# Patient Record
Sex: FEMALE | Race: WHITE | Employment: FULL TIME | URBAN - METROPOLITAN AREA
[De-identification: names, ages, dates, MRNs, and addresses within clinical notes are randomized per-mention and may not be internally consistent; named-entity substitution may affect disease eponyms.]

---

## 2017-01-02 DIAGNOSIS — M62.830 BACK MUSCLE SPASM: ICD-10-CM

## 2017-01-02 DIAGNOSIS — E78.5 HYPERLIPIDEMIA LDL GOAL <100: ICD-10-CM

## 2017-01-02 DIAGNOSIS — I10 HYPERTENSION GOAL BP (BLOOD PRESSURE) < 140/90: Primary | ICD-10-CM

## 2017-01-02 NOTE — TELEPHONE ENCOUNTER
zanaflex      Last Written Prescription Date:  7/13/2016  Last Fill Quantity: 90,   # refills: 0  Last Office Visit with [facility], [facility] or [facility] prescribing provider: 6/17/16  Future Office visit:       Routing refill request to provider for review/approval because:  Drug not on the [facility], [facility] or [facility] refill protocol or controlled substance    Zocor     Last Written Prescription Date: 12/28/15  Last Fill Quantity: 90, # refills: 1  Last Office Visit with [facility], [facility] or [facility] prescribing provider: 6/17/16       CHOL      154   12/1/2015  HDL       45   12/1/2015  LDL       85   12/1/2015  TRIG      121   12/1/2015  CHOLHDLRATIO      2.8   8/11/2014    toprol      Last Written Prescription Date: 12/28/15  Last Fill Quantity: 135, # refills: 3  Last Office Visit with [facility], [facility] or [facility] prescribing provider: 6/17/16       POTASSIUM   Date Value Ref Range Status   12/07/2015 3.5 3.4 - 5.3 mmol/L Final     CREATININE   Date Value Ref Range Status   12/07/2015 0.65 0.52 - 1.04 mg/dL Final     BP Readings from Last 3 Encounters:   06/17/16 116/70   01/25/16 138/80   12/28/15 120/80

## 2017-01-03 RX ORDER — METOPROLOL SUCCINATE 100 MG/1
TABLET, EXTENDED RELEASE ORAL
Qty: 90 TABLET | Refills: 0 | Status: SHIPPED | OUTPATIENT
Start: 2017-01-03 | End: 2017-01-23

## 2017-01-03 RX ORDER — TIZANIDINE 2 MG/1
TABLET ORAL
Qty: 90 TABLET | Refills: 0 | Status: SHIPPED | OUTPATIENT
Start: 2017-01-03 | End: 2017-01-23

## 2017-01-03 RX ORDER — SIMVASTATIN 40 MG
TABLET ORAL
Qty: 90 TABLET | Refills: 0 | Status: SHIPPED | OUTPATIENT
Start: 2017-01-03 | End: 2017-01-23

## 2017-01-04 NOTE — TELEPHONE ENCOUNTER
Left message for patient to call back, and speak with any .     Thank you,   Janeth Martinez   for [facility]

## 2017-01-04 NOTE — TELEPHONE ENCOUNTER
Message from Brook Haro PA-C sent at 1/3/2017 12:14 PM CST  Patient-she is overdue for her yearly physical and refill meds. Please make sure appointment is in the morning so that she can be fasting for lab work related to medications.

## 2017-01-23 ENCOUNTER — OFFICE VISIT (OUTPATIENT)
Dept: FAMILY MEDICINE | Facility: CLINIC | Age: 52
End: 2017-01-23
Payer: COMMERCIAL

## 2017-01-23 VITALS
SYSTOLIC BLOOD PRESSURE: 120 MMHG | DIASTOLIC BLOOD PRESSURE: 70 MMHG | RESPIRATION RATE: 16 BRPM | TEMPERATURE: 98.3 F | HEIGHT: 64 IN | WEIGHT: 221 LBS | BODY MASS INDEX: 37.73 KG/M2 | HEART RATE: 72 BPM

## 2017-01-23 DIAGNOSIS — Z12.11 SCREENING FOR MALIGNANT NEOPLASM OF COLON: ICD-10-CM

## 2017-01-23 DIAGNOSIS — E78.5 HYPERLIPIDEMIA LDL GOAL <100: ICD-10-CM

## 2017-01-23 DIAGNOSIS — F41.0 ANXIETY ATTACK: ICD-10-CM

## 2017-01-23 DIAGNOSIS — I10 HYPERTENSION GOAL BP (BLOOD PRESSURE) < 140/90: ICD-10-CM

## 2017-01-23 DIAGNOSIS — F41.1 GAD (GENERALIZED ANXIETY DISORDER): ICD-10-CM

## 2017-01-23 DIAGNOSIS — J45.20 MILD INTERMITTENT ASTHMA, UNCOMPLICATED: ICD-10-CM

## 2017-01-23 DIAGNOSIS — B35.4 TINEA CORPORIS: ICD-10-CM

## 2017-01-23 DIAGNOSIS — Z23 NEED FOR VACCINATION: ICD-10-CM

## 2017-01-23 DIAGNOSIS — Z00.01 ENCOUNTER FOR ROUTINE ADULT MEDICAL EXAM WITH ABNORMAL FINDINGS: Primary | ICD-10-CM

## 2017-01-23 DIAGNOSIS — R79.89 ELEVATED TSH: ICD-10-CM

## 2017-01-23 PROBLEM — R73.01 ABNORMAL FASTING GLUCOSE: Status: ACTIVE | Noted: 2017-01-23

## 2017-01-23 LAB
ALBUMIN SERPL-MCNC: 4 G/DL (ref 3.4–5)
ALP SERPL-CCNC: 57 U/L (ref 40–150)
ALT SERPL W P-5'-P-CCNC: 24 U/L (ref 0–50)
ANION GAP SERPL CALCULATED.3IONS-SCNC: 6 MMOL/L (ref 3–14)
AST SERPL W P-5'-P-CCNC: 12 U/L (ref 0–45)
BILIRUB SERPL-MCNC: 0.8 MG/DL (ref 0.2–1.3)
BUN SERPL-MCNC: 16 MG/DL (ref 7–30)
CALCIUM SERPL-MCNC: 9 MG/DL (ref 8.5–10.1)
CHLORIDE SERPL-SCNC: 106 MMOL/L (ref 94–109)
CHOLEST SERPL-MCNC: 160 MG/DL
CO2 SERPL-SCNC: 30 MMOL/L (ref 20–32)
CREAT SERPL-MCNC: 0.75 MG/DL (ref 0.52–1.04)
CREAT UR-MCNC: 154 MG/DL
GFR SERPL CREATININE-BSD FRML MDRD: 81 ML/MIN/1.7M2
GLUCOSE SERPL-MCNC: 104 MG/DL (ref 70–99)
HDLC SERPL-MCNC: 55 MG/DL
LDLC SERPL CALC-MCNC: 80 MG/DL
MICROALBUMIN UR-MCNC: <5 MG/L
MICROALBUMIN/CREAT UR: NORMAL MG/G CR (ref 0–25)
NONHDLC SERPL-MCNC: 105 MG/DL
POTASSIUM SERPL-SCNC: 4.1 MMOL/L (ref 3.4–5.3)
PROT SERPL-MCNC: 7.3 G/DL (ref 6.8–8.8)
SODIUM SERPL-SCNC: 142 MMOL/L (ref 133–144)
T4 FREE SERPL-MCNC: 0.93 NG/DL (ref 0.76–1.46)
TRIGL SERPL-MCNC: 125 MG/DL
TSH SERPL DL<=0.05 MIU/L-ACNC: 5.27 MU/L (ref 0.4–4)

## 2017-01-23 PROCEDURE — 80061 LIPID PANEL: CPT | Performed by: PHYSICIAN ASSISTANT

## 2017-01-23 PROCEDURE — 90471 IMMUNIZATION ADMIN: CPT | Performed by: PHYSICIAN ASSISTANT

## 2017-01-23 PROCEDURE — 90670 PCV13 VACCINE IM: CPT | Performed by: PHYSICIAN ASSISTANT

## 2017-01-23 PROCEDURE — 99396 PREV VISIT EST AGE 40-64: CPT | Mod: 25 | Performed by: PHYSICIAN ASSISTANT

## 2017-01-23 PROCEDURE — 82274 ASSAY TEST FOR BLOOD FECAL: CPT | Performed by: PHYSICIAN ASSISTANT

## 2017-01-23 PROCEDURE — 84443 ASSAY THYROID STIM HORMONE: CPT | Performed by: PHYSICIAN ASSISTANT

## 2017-01-23 PROCEDURE — 99212 OFFICE O/P EST SF 10 MIN: CPT | Mod: 25 | Performed by: PHYSICIAN ASSISTANT

## 2017-01-23 PROCEDURE — 36415 COLL VENOUS BLD VENIPUNCTURE: CPT | Performed by: PHYSICIAN ASSISTANT

## 2017-01-23 PROCEDURE — 82043 UR ALBUMIN QUANTITATIVE: CPT | Performed by: PHYSICIAN ASSISTANT

## 2017-01-23 PROCEDURE — 80053 COMPREHEN METABOLIC PANEL: CPT | Performed by: PHYSICIAN ASSISTANT

## 2017-01-23 PROCEDURE — 84439 ASSAY OF FREE THYROXINE: CPT | Performed by: PHYSICIAN ASSISTANT

## 2017-01-23 RX ORDER — SIMVASTATIN 40 MG
40 TABLET ORAL DAILY
Qty: 90 TABLET | Refills: 3 | Status: SHIPPED | OUTPATIENT
Start: 2017-01-23 | End: 2018-02-19

## 2017-01-23 RX ORDER — FLUCONAZOLE 200 MG/1
TABLET ORAL
Qty: 60 TABLET | Refills: 0 | Status: SHIPPED | OUTPATIENT
Start: 2017-01-23 | End: 2019-03-12

## 2017-01-23 RX ORDER — METOPROLOL SUCCINATE 100 MG/1
TABLET, EXTENDED RELEASE ORAL
Qty: 90 TABLET | Refills: 3 | Status: SHIPPED | OUTPATIENT
Start: 2017-01-23 | End: 2018-02-19 | Stop reason: DRUGHIGH

## 2017-01-23 RX ORDER — IPRATROPIUM BROMIDE AND ALBUTEROL SULFATE 2.5; .5 MG/3ML; MG/3ML
SOLUTION RESPIRATORY (INHALATION)
Qty: 180 ML | Refills: 4 | Status: SHIPPED | OUTPATIENT
Start: 2017-01-23 | End: 2018-02-19

## 2017-01-23 RX ORDER — ALBUTEROL SULFATE 90 UG/1
2 AEROSOL, METERED RESPIRATORY (INHALATION) EVERY 6 HOURS PRN
Qty: 3 INHALER | Refills: 3 | Status: SHIPPED | OUTPATIENT
Start: 2017-01-23 | End: 2018-02-19

## 2017-01-23 RX ORDER — LORAZEPAM 0.5 MG/1
.25-.5 TABLET ORAL EVERY 6 HOURS PRN
Qty: 90 TABLET | Refills: 0 | Status: SHIPPED | OUTPATIENT
Start: 2017-01-23 | End: 2018-02-19

## 2017-01-23 ASSESSMENT — PAIN SCALES - GENERAL: PAINLEVEL: NO PAIN (0)

## 2017-01-23 NOTE — PROGRESS NOTES
Quick Note:    TSH continues to be mildly elevated - will watch with labs every 6-12 months  ______

## 2017-01-23 NOTE — NURSING NOTE
"Chief Complaint   Patient presents with     Physical       Initial /70 mmHg  Pulse 72  Temp(Src) 98.3  F (36.8  C) (Tympanic)  Resp 16  Ht 5' 4.4\" (1.636 m)  Wt 221 lb (100.245 kg)  BMI 37.45 kg/m2  LMP 11/09/2016 Estimated body mass index is 37.45 kg/(m^2) as calculated from the following:    Height as of this encounter: 5' 4.4\" (1.636 m).    Weight as of this encounter: 221 lb (100.245 kg).  BP completed using cuff size: large  Teri Jones CMA ([facility])   "

## 2017-01-23 NOTE — PROGRESS NOTES
Quick Note:    Labs normal other than sugar is mildly elevated at 104. Fasting sugar is elevated at 104 would like to see this less than 100. Usually this increases with age due to weight gain and lack of activity/exercise. It is important to be exercising on a consistent basis, watching simple sugars and complex carbohydrates. One option would be meeting with our dietitian to discuss dietary and lifestyle management. If interested should contact us. Otherwise we should be checking this yearly with physicals should come fasting for any blood work in the future.    ______

## 2017-01-23 NOTE — PROGRESS NOTES
SUBJECTIVE:     CC: Vy Villalobos is an 51 year old woman who presents for preventive health visit.     Healthy Habits:    Do you get at least three servings of calcium containing foods daily (dairy, green leafy vegetables, etc.)? yes    Amount of exercise or daily activities, outside of work: 0 day(s) per week    Problems taking medications regularly No    Medication side effects: No    Have you had an eye exam in the past two years? yes    Do you see a dentist twice per year? Once    Do you have sleep apnea, excessive snoring or daytime drowsiness?no        Hyperlipidemia Follow-Up      Rate your low fat/cholesterol diet?: fair    Taking statin?  Yes, no muscle aches from statin    Other lipid medications/supplements?:  Fish oil/Omega 3, 1200mg dose  without side effects     Hypertension Follow-up      Outpatient blood pressures are being checked at home.  Results are WNL.    Low Salt Diet: no added salt     Asthma Follow-Up    Was ACT completed today?    Yes    ACT Total Scores 1/23/2017   ACT TOTAL SCORE -   ASTHMA ER VISITS -   ASTHMA HOSPITALIZATIONS -   ACT TOTAL SCORE (Goal Greater than or Equal to 20) 20   In the past 12 months, how many times did you visit the emergency room for your asthma without being admitted to the hospital? 0   In the past 12 months, how many times were you hospitalized overnight because of your asthma? 0     Generalized anxiety disorder-  Is on Zoloft 50 mg daily. Has been on this for some time. No new concerns-would like same dose of medication. Also uses as needed alprazolam in very low doses. Due for refill on this today.    Today's PHQ-2 Score:   PHQ-2 ( 1999 Pfizer) 1/23/2017 6/17/2016   Q1: Little interest or pleasure in doing things 0 0   Q2: Feeling down, depressed or hopeless 0 0   PHQ-2 Score 0 0       Abuse: Current or Past(Physical, Sexual or Emotional)- No  Do you feel safe in your environment - Yes    Social History   Substance Use Topics     Smoking status: Never  Smoker      Smokeless tobacco: Never Used     Alcohol Use: No     The patient does not drink >3 drinks per day nor >7 drinks per week.    Recent Labs   Lab Test  12/01/15   0824  14   0841  13   0831   CHOL  154  141  131   HDL  45*  51  39*   LDL  85  65  73   TRIG  121  127  96   CHOLHDLRATIO   --   2.8  3.0   NHDL  109   --    --        Reviewed orders with patient.  Reviewed health maintenance and updated orders accordingly - Yes    Mammo Decision Support:  Patient over age 50, mutual decision to screen reflected in health maintenance.    Pertinent mammograms are reviewed under the imaging tab.  History of abnormal Pap smear:   Last 3 Pap Results:   PAP (no units)   Date Value   2015 NIL   10/22/2012 NIL   2009 NIL     All Histories reviewed and updated in Epic.  Past Medical History   Diagnosis Date     Mild intermittent asthma      albuterol prn, prednisone as needed.     CARDIOMEGALY 2007     Ventricular - see echo 10/07     MITRAL VALVE DISORDER 2007     Regurg - mild - see echo 10/07     Past Surgical History   Procedure Laterality Date     Hc removal of ovarian cyst(s)       laparascopic ovarian cystectomy.      hysteroscopy w endometrial bx/polypectomy w/wo d&c  10/26/2000     Hysteroscopy, dilation and curettage, and diagnostic laparoscopy     Tubal ligation            Hysteroscopy  2007     Hysteroscopy, D&C and endometrial ablation.     Social History   Substance Use Topics     Smoking status: Never Smoker      Smokeless tobacco: Never Used     Alcohol Use: No     Family History   Problem Relation Age of Onset     Hypertension Mother      Hypertension Father      Breast Cancer Maternal Aunt       in her 40's     Alzheimer Disease Paternal Aunt      dx at 69     Arthritis Mother      Eye Disorder Mother      glaucoma     Eye Disorder Father      glaucoma     Eye Disorder Maternal Grandmother      glaucoma     Lipids Mother      Lipids Father       Respiratory Father      asthma        Allergies   Allergen Reactions     Doxycycline Itching     Current Outpatient Prescriptions   Medication Sig Dispense Refill     simvastatin (ZOCOR) 40 MG tablet Take 1 tablet (40 mg) by mouth daily 90 tablet 3     metoprolol (TOPROL-XL) 100 MG 24 hr tablet TAKE 1 TABLET (100 MG) BY MOUTH DAILY 90 tablet 3     ipratropium - albuterol 0.5 mg/2.5 mg/3 mL (DUONEB) 0.5-2.5 (3) MG/3ML neb solution TAKE 1 VIAL (3 MLS) BY NEBULIZATION EVERY 6 HOURS AS NEEDED FOR SHORTN ESS OF BREATH / DYSPNEA 180 mL 4     albuterol (PROAIR HFA/PROVENTIL HFA/VENTOLIN HFA) 108 (90 BASE) MCG/ACT Inhaler Inhale 2 puffs into the lungs every 6 hours as needed for shortness of breath / dyspnea 3 Inhaler 3     LORazepam (ATIVAN) 0.5 MG tablet Take 0.5-1 tablets (0.25-0.5 mg) by mouth every 6 hours as needed for anxiety 90 tablet 0     sertraline (ZOLOFT) 50 MG tablet Take 1 tablet (50 mg) by mouth daily 90 tablet 3     fluconazole (DIFLUCAN) 200 MG tablet 1 tablet day 1 then 1/2 tablet days 2-15. Use with flares of yeast infection on skin. 60 tablet 0     FISH OIL 2 tablets in am and 2 tablets in pm       Multiple Vitamins-Minerals (CENTRUM PO) Take  by mouth.       CALCIUM-VITAMIN D PO Take  by mouth. daily       VITAMIN C, CALCIUM ASCORBATE, PO Take  by mouth. daily       [DISCONTINUED] simvastatin (ZOCOR) 40 MG tablet TAKE ONE TABLET BY MOUTH DAILY 90 tablet 0     [DISCONTINUED] metoprolol (TOPROL-XL) 100 MG 24 hr tablet TAKE 1 TABLET (100 MG) BY MOUTH DAILY 90 tablet 0     [DISCONTINUED] ipratropium - albuterol 0.5 mg/2.5 mg/3 mL (DUONEB) 0.5-2.5 (3) MG/3ML nebulization TAKE 1 VIAL (3 MLS) BY NEBULIZATION EVERY 6 HOURS AS NEEDED FOR SHORTN ESS OF BREATH / DYSPNEA 180 mL 4     [DISCONTINUED] albuterol (PROAIR HFA, PROVENTIL HFA, VENTOLIN HFA) 108 (90 BASE) MCG/ACT inhaler Inhale 2 puffs into the lungs every 6 hours as needed for shortness of breath / dyspnea 3 Inhaler 3     [DISCONTINUED] sertraline  "(ZOLOFT) 50 MG tablet Take 1 tablet (50 mg) by mouth daily 90 tablet 3             ROS:  C: NEGATIVE for fever, chills, change in weight  INTEGUMENTARY/SKIN: Persistent rash along bra line anterior-diagnosed several months ago with a yeast infection. Has been given Diflucan 150×1, over-the-counter antifungal cream, and Nizoral powder. Has improved tremendously, but still slightly present. She is also using a specialized pad to help with moisture. Cornstarch is helped as well.  E: NEGATIVE for vision changes or irritation  ENT: NEGATIVE for ear, mouth and throat problems  R: NEGATIVE for significant cough or SOB  B: NEGATIVE for masses, tenderness or discharge  CV: NEGATIVE for chest pain, palpitations or peripheral edema  GI: NEGATIVE for nausea, abdominal pain, heartburn, or change in bowel habits  : NEGATIVE for unusual urinary or vaginal symptoms. No vaginal bleeding.  M: NEGATIVE for significant arthralgias or myalgia  N: NEGATIVE for weakness, dizziness or paresthesias  P: NEGATIVE for changes in mood or affect     Problem list, Medication list, Allergies, and Medical/Social/Surgical histories reviewed in [facility] and updated as appropriate.  OBJECTIVE:     /70 mmHg  Pulse 72  Temp(Src) 98.3  F (36.8  C) (Tympanic)  Resp 16  Ht 5' 4.4\" (1.636 m)  Wt 221 lb (100.245 kg)  BMI 37.45 kg/m2  LMP 11/09/2016  EXAM:  GENERAL: alert, no distress and obese  EYES: Eyes grossly normal to inspection, PERRL and conjunctivae and sclerae normal  HENT: ear canals and TM's normal, nose and mouth without ulcers or lesions  NECK: no adenopathy, no asymmetry, masses, or scars and thyroid normal to palpation  RESP: lungs clear to auscultation - no rales, rhonchi or wheezes  BREAST: normal without masses, tenderness or nipple discharge and no palpable axillary masses or adenopathy  CV: regular rate and rhythm, normal S1 S2, no S3 or S4, no murmur, click or rub, no peripheral edema and peripheral pulses strong  ABDOMEN: " "soft, nontender, no hepatosplenomegaly, no masses and bowel sounds normal   (female): deferred  MS: no gross musculoskeletal defects noted, no edema  SKIN: Erythematous irregular shaped nonraised rash beneath both breasts left greater than right. Appears consistent with a tinea infection.  NEURO: Normal strength and tone, mentation intact and speech normal  PSYCH: mentation appears normal, affect normal/bright    ASSESSMENT/PLAN:         ICD-10-CM    1. Encounter for routine adult medical exam with abnormal findings Z00.01    2. Intermittent asthma J45.20 Asthma Action Plan (AAP)   3. Hyperlipidemia LDL goal <100 E78.5 simvastatin (ZOCOR) 40 MG tablet   4. Hypertension goal BP (blood pressure) < 140/90 I10 metoprolol (TOPROL-XL) 100 MG 24 hr tablet   5. Mild intermittent asthma, uncomplicated J45.20 ipratropium - albuterol 0.5 mg/2.5 mg/3 mL (DUONEB) 0.5-2.5 (3) MG/3ML neb solution   6. Intermittent asthma, uncomplicated J45.20 albuterol (PROAIR HFA/PROVENTIL HFA/VENTOLIN HFA) 108 (90 BASE) MCG/ACT Inhaler   7. Anxiety attack F41.0 LORazepam (ATIVAN) 0.5 MG tablet   8. SYLVIA (generalized anxiety disorder) F41.1 sertraline (ZOLOFT) 50 MG tablet   9. Tinea corporis B35.4 fluconazole (DIFLUCAN) 200 MG tablet   10. Screening for malignant neoplasm of colon Z12.11 Fecal colorectal cancer screen (FIT)   11. Need for vaccination Z23 Pneumococcal vaccine 13 valent PCV13 IM (Prevnar) [82854]     1st  Administration  [49235]   12. Elevated TSH R94.6 TSH       COUNSELING:   Reviewed preventive health counseling, as reflected in patient instructions       Regular exercise       Healthy diet/nutrition       Vaccinated for: Pneumococcal 13       Osteoporosis Prevention/Bone Health         reports that she has never smoked. She has never used smokeless tobacco.    Estimated body mass index is 37.45 kg/(m^2) as calculated from the following:    Height as of this encounter: 5' 4.4\" (1.636 m).    Weight as of this encounter: 221 lb " (100.245 kg).   Weight management plan: Discussed healthy diet and exercise guidelines and patient will follow up in 12 months in clinic to re-evaluate.     Prescription for oral Diflucan ×15 days written. Continue with other topical products and keeping the area dry. I did do a fasting glucose today-I did tell her that this is sometimes more common in individuals who are prediabetic or diabetic. Will contact her with labs as they return.    All medications written ×1 year.  FIT test yearly as she is unwilling to do a colonoscopy.  Mammogram normal couple of weeks ago-will continue with yearly screenings.    Counseling Resources:  ATP IV Guidelines  Pooled Cohorts Equation Calculator  Breast Cancer Risk Calculator  FRAX Risk Assessment  ICSI Preventive Guidelines  Dietary Guidelines for Americans, 2010  [employer]'s MyPlate  ASA Prophylaxis  Lung CA Screening    Brook Haro PA-C  [facility]    Orders Placed This Encounter     Asthma Action Plan (AAP)     Pneumococcal vaccine 13 valent PCV13 IM (Prevnar) [67501]     1st  Administration  [08126]     Albumin Random Urine Quantitative     Comprehensive metabolic panel (BMP + Alb, Alk Phos, ALT, AST, Total. Bili, TP)     Lipid Profile (Chol, Trig, HDL, LDL calc)     Fecal colorectal cancer screen (FIT)     TSH     simvastatin (ZOCOR) 40 MG tablet     metoprolol (TOPROL-XL) 100 MG 24 hr tablet     ipratropium - albuterol 0.5 mg/2.5 mg/3 mL (DUONEB) 0.5-2.5 (3) MG/3ML neb solution     albuterol (PROAIR HFA/PROVENTIL HFA/VENTOLIN HFA) 108 (90 BASE) MCG/ACT Inhaler     LORazepam (ATIVAN) 0.5 MG tablet     sertraline (ZOLOFT) 50 MG tablet     fluconazole (DIFLUCAN) 200 MG tablet       Chart documentation done in part with Dragon Voice recognition Software. Although reviewed after completion, some word and grammatical error may remain.  AVS given to patient upon discharge today.  Electronically signed by Brook Haro PA-C  January 23, 2017  10:29  AM

## 2017-01-23 NOTE — NURSING NOTE
Prior to injection verified patient identity using patient's name and date of birth.. Patient instructed to wait 20 minutes before leaving clinic. Observe for s/sx of complications and or reactions.  Screening Questionnaire for Adult Immunization    Are you sick today?   No   Do you have allergies to medications, food, a vaccine component or latex?   No   Have you ever had a serious reaction after receiving a vaccination?   No   Do you have a long-term health problem with heart disease, lung disease, asthma, kidney disease, metabolic disease (e.g. diabetes), anemia, or other blood disorder?   No   Do you have cancer, leukemia, HIV/AIDS, or any other immune system problem?   No   In the past 3 months, have you taken medications that affect  your immune system, such as prednisone, other steroids, or anticancer drugs; drugs for the treatment of rheumatoid arthritis, Crohn s disease, or psoriasis; or have you had radiation treatments?   No   Have you had a seizure, or a brain or other nervous system problem?   No   During the past year, have you received a transfusion of blood or blood     products, or been given immune (gamma) globulin or antiviral drug?   No   For women: Are you pregnant or is there a chance you could become        pregnant during the next month?   No   Have you received any vaccinations in the past 4 weeks?   No     Immunization questionnaire answers were all negative.      MNVFC doesn't apply on this patient    Screening performed by Teri Jones on 1/23/2017 at 8:42 AM.    Teri Jones CMA ([facility])

## 2017-01-23 NOTE — MR AVS SNAPSHOT
After Visit Summary   1/23/2017    Vy Villalobos    MRN: [id number]           Patient Information     Date Of Birth          1965        Visit Information        Provider Department      1/23/2017 8:00 AM Brook Haro PA-C [facility]        Today's Diagnoses     Intermittent asthma    -  1     Hyperlipidemia LDL goal <100         Hypertension goal BP (blood pressure) < 140/90         Mild intermittent asthma, uncomplicated         Intermittent asthma, uncomplicated         Anxiety attack         SYLVIA (generalized anxiety disorder)         Encounter for general adult medical examination without abnormal findings         Tinea corporis           Care Instructions      Preventive Health Recommendations  Female Ages 50 - 64    Yearly exam: See your health care provider every year in order to  o Review health changes.   o Discuss preventive care.    o Review your medicines if your doctor has prescribed any.      Get a Pap test every three years (unless you have an abnormal result and your provider advises testing more often).    If you get Pap tests with HPV test, you only need to test every 5 years, unless you have an abnormal result.     You do not need a Pap test if your uterus was removed (hysterectomy) and you have not had cancer.    You should be tested each year for STDs (sexually transmitted diseases) if you're at risk.     Have a mammogram every 1 to 2 years.    Have a colonoscopy at age 50, or have a yearly FIT test (stool test). These exams screen for colon cancer.      Have a cholesterol test every 5 years, or more often if advised.    Have a diabetes test (fasting glucose) every three years. If you are at risk for diabetes, you should have this test more often.     If you are at risk for osteoporosis (brittle bone disease), think about having a bone density scan (DEXA).    Shots: Get a flu shot each year. Get a tetanus shot every 10 years.    Nutrition:     Eat at least  "5 servings of fruits and vegetables each day.    Eat whole-grain bread, whole-wheat pasta and brown rice instead of white grains and rice.    Talk to your provider about Calcium and Vitamin D.     Lifestyle    Exercise at least 150 minutes a week (30 minutes a day, 5 days a week). This will help you control your weight and prevent disease.    Limit alcohol to one drink per day.    No smoking.     Wear sunscreen to prevent skin cancer.     See your dentist every six months for an exam and cleaning.    See your eye doctor every 1 to 2 years.            Follow-ups after your visit        Who to contact     If you have questions or need follow up information about today's clinic visit or your schedule please contact [facility] directly at [phone].  Normal or non-critical lab and imaging results will be communicated to you by [employer], letter or phone within 4 business days after the clinic has received the results. If you do not hear from us within 7 days, please contact the clinic through [employer] or phone. If you have a critical or abnormal lab result, we will notify you by phone as soon as possible.  Submit refill requests through [employer] or call your pharmacy and they will forward the refill request to us. Please allow 3 business days for your refill to be completed.          Additional Information About Your Visit        [employer] Information     [employer] lets you send messages to your doctor, view your test results, renew your prescriptions, schedule appointments and more. To sign up, go to [email or web address] . Click on \"Log in\" on the left side of the screen, which will take you to the Welcome page. Then click on \"Sign up Now\" on the right side of the page.     You will be asked to enter the access code listed below, as well as some personal information. Please follow the directions to create your username and password.     Your access code is: [email or web address]  Expires: 4/23/2017  8:26 AM   " "  Your access code will  in 90 days. If you need help or a new code, please call your [place] clinic or [phone].        Care EveryWhere ID     This is your Care EveryWhere ID. This could be used by other organizations to access your [facility] medical records  [id number]        Your Vitals Were     Pulse Temperature Respirations Height BMI (Body Mass Index) Last Period    72 98.3  F (36.8  C) (Tympanic) 16 5' 4.4\" (1.636 m) 37.45 kg/m2 2016       Blood Pressure from Last 3 Encounters:   17 120/70   16 116/70   16 138/80    Weight from Last 3 Encounters:   17 221 lb (100.245 kg)   16 213 lb (96.616 kg)   16 212 lb (96.163 kg)              We Performed the Following     Albumin Random Urine Quantitative     Asthma Action Plan (AAP)     Comprehensive metabolic panel (BMP + Alb, Alk Phos, ALT, AST, Total. Bili, TP)     Lipid Profile (Chol, Trig, HDL, LDL calc)          Today's Medication Changes          These changes are accurate as of: 17  8:26 AM.  If you have any questions, ask your nurse or doctor.               Start taking these medicines.        Dose/Directions    fluconazole 200 MG tablet   Commonly known as:  DIFLUCAN   Used for:  Tinea corporis   Started by:  Brook Haro PA-C        1 tablet day 1 then 1/2 tablet days 2-15. Use with flares of yeast infection on skin.   Quantity:  60 tablet   Refills:  0         These medicines have changed or have updated prescriptions.        Dose/Directions    ipratropium - albuterol 0.5 mg/2.5 mg/3 mL 0.5-2.5 (3) MG/3ML neb solution   Commonly known as:  DUONEB   This may have changed:  See the new instructions.   Used for:  Mild intermittent asthma, uncomplicated   Changed by:  Brook Haro PA-C        TAKE 1 VIAL (3 MLS) BY NEBULIZATION EVERY 6 HOURS AS NEEDED FOR SHORTN ESS OF BREATH / DYSPNEA   Quantity:  180 mL   Refills:  4       metoprolol 100 MG 24 hr tablet   Commonly known as:  TOPROL-XL "   This may have changed:  See the new instructions.   Used for:  Hypertension goal BP (blood pressure) < 140/90   Changed by:  Brook Haro PA-C        TAKE 1 TABLET (100 MG) BY MOUTH DAILY   Quantity:  90 tablet   Refills:  3       simvastatin 40 MG tablet   Commonly known as:  ZOCOR   This may have changed:  See the new instructions.   Used for:  Hyperlipidemia LDL goal <100   Changed by:  Brook Haro PA-C        Dose:  40 mg   Take 1 tablet (40 mg) by mouth daily   Quantity:  90 tablet   Refills:  3            Where to get your medicines      These medications were sent to [id number] - [place] - [address]  [address], [place] [address]     Phone:  [phone]    - albuterol 108 (90 BASE) MCG/ACT Inhaler  - fluconazole 200 MG tablet  - ipratropium - albuterol 0.5 mg/2.5 mg/3 mL 0.5-2.5 (3) MG/3ML neb solution  - metoprolol 100 MG 24 hr tablet  - sertraline 50 MG tablet  - simvastatin 40 MG tablet      Some of these will need a paper prescription and others can be bought over the counter.  Ask your nurse if you have questions.     Bring a paper prescription for each of these medications    - LORazepam 0.5 MG tablet             Primary Care Provider Office Phone # Fax #    Brook Haro PA-C [phone]       [address] [place] [address]        Thank you!     Thank you for choosing [facility]  for your care. Our goal is always to provide you with excellent care. Hearing back from our patients is one way we can continue to improve our services. Please take a few minutes to complete the written survey that you may receive in the mail after your visit with us. Thank you!             Your Updated Medication List - Protect others around you: Learn how to safely use, store and throw away your medicines at www.disposemymeds.org.          This list is accurate as of: 1/23/17  8:26 AM.  Always use your most recent med  list.                   Brand Name Dispense Instructions for use    albuterol 108 (90 BASE) MCG/ACT Inhaler    PROAIR HFA/PROVENTIL HFA/VENTOLIN HFA    3 Inhaler    Inhale 2 puffs into the lungs every 6 hours as needed for shortness of breath / dyspnea       CALCIUM-VITAMIN D PO      Take  by mouth. daily       CENTRUM PO      Take  by mouth.       FISH OIL      2 tablets in am and 2 tablets in pm       fluconazole 200 MG tablet    DIFLUCAN    60 tablet    1 tablet day 1 then 1/2 tablet days 2-15. Use with flares of yeast infection on skin.       ipratropium - albuterol 0.5 mg/2.5 mg/3 mL 0.5-2.5 (3) MG/3ML neb solution    DUONEB    180 mL    TAKE 1 VIAL (3 MLS) BY NEBULIZATION EVERY 6 HOURS AS NEEDED FOR SHORTN ESS OF BREATH / DYSPNEA       LORazepam 0.5 MG tablet    ATIVAN    90 tablet    Take 0.5-1 tablets (0.25-0.5 mg) by mouth every 6 hours as needed for anxiety       metoprolol 100 MG 24 hr tablet    TOPROL-XL    90 tablet    TAKE 1 TABLET (100 MG) BY MOUTH DAILY       sertraline 50 MG tablet    ZOLOFT    90 tablet    Take 1 tablet (50 mg) by mouth daily       simvastatin 40 MG tablet    ZOCOR    90 tablet    Take 1 tablet (40 mg) by mouth daily       VITAMIN C (CALCIUM ASCORBATE) PO      Take  by mouth. daily

## 2017-01-24 ASSESSMENT — ASTHMA QUESTIONNAIRES: ACT_TOTALSCORE: 20

## 2017-01-25 DIAGNOSIS — Z12.11 SCREENING FOR MALIGNANT NEOPLASM OF COLON: ICD-10-CM

## 2017-01-26 ENCOUNTER — TELEPHONE (OUTPATIENT)
Dept: FAMILY MEDICINE | Facility: CLINIC | Age: 52
End: 2017-01-26

## 2017-01-26 LAB — HEMOCCULT STL QL IA: NEGATIVE

## 2017-01-26 NOTE — PROGRESS NOTES
Quick Note:    Please let this patient know the FIT testing for colon cancer screening was negative. We will repeat yearly if she is choosing to avoid colonoscopy.    ______

## 2017-01-30 DIAGNOSIS — F41.1 GAD (GENERALIZED ANXIETY DISORDER): Primary | ICD-10-CM

## 2017-01-31 NOTE — TELEPHONE ENCOUNTER
SERTRALINE     Last Written Prescription Date: 01/23/17  Last Fill Quantity: 90, # refills: 3  Last Office Visit with [facility] primary care provider:  01/23/17        Last PHQ-9 score on record=   PHQ-9 SCORE 1/25/2016   Total Score -   Total Score 0

## 2017-02-01 NOTE — TELEPHONE ENCOUNTER
Prescription approved per [facility] Refill Protocol..............COY Donis

## 2018-02-19 ENCOUNTER — HOSPITAL ENCOUNTER (OUTPATIENT)
Dept: MAMMOGRAPHY | Facility: CLINIC | Age: 53
Discharge: HOME | End: 2018-02-19
Attending: PHYSICIAN ASSISTANT | Admitting: PHYSICIAN ASSISTANT
Payer: COMMERCIAL

## 2018-02-19 ENCOUNTER — OFFICE VISIT (OUTPATIENT)
Dept: FAMILY MEDICINE | Facility: CLINIC | Age: 53
End: 2018-02-19
Payer: COMMERCIAL

## 2018-02-19 VITALS
HEIGHT: 65 IN | DIASTOLIC BLOOD PRESSURE: 82 MMHG | WEIGHT: 234 LBS | BODY MASS INDEX: 38.99 KG/M2 | SYSTOLIC BLOOD PRESSURE: 132 MMHG | HEART RATE: 89 BPM | OXYGEN SATURATION: 99 % | TEMPERATURE: 98.7 F | RESPIRATION RATE: 18 BRPM

## 2018-02-19 DIAGNOSIS — F41.0 ANXIETY ATTACK: ICD-10-CM

## 2018-02-19 DIAGNOSIS — I10 HYPERTENSION GOAL BP (BLOOD PRESSURE) < 140/90: ICD-10-CM

## 2018-02-19 DIAGNOSIS — J45.20 MILD INTERMITTENT ASTHMA, UNCOMPLICATED: ICD-10-CM

## 2018-02-19 DIAGNOSIS — Z00.01 ENCOUNTER FOR ROUTINE ADULT MEDICAL EXAM WITH ABNORMAL FINDINGS: Primary | ICD-10-CM

## 2018-02-19 DIAGNOSIS — Z12.31 VISIT FOR SCREENING MAMMOGRAM: ICD-10-CM

## 2018-02-19 DIAGNOSIS — E78.5 HYPERLIPIDEMIA LDL GOAL <100: ICD-10-CM

## 2018-02-19 DIAGNOSIS — Z12.11 SPECIAL SCREENING FOR MALIGNANT NEOPLASMS, COLON: ICD-10-CM

## 2018-02-19 DIAGNOSIS — F41.1 GAD (GENERALIZED ANXIETY DISORDER): ICD-10-CM

## 2018-02-19 DIAGNOSIS — R80.9 PROTEIN IN URINE: ICD-10-CM

## 2018-02-19 DIAGNOSIS — R79.89 ELEVATED TSH: ICD-10-CM

## 2018-02-19 LAB
ALBUMIN SERPL-MCNC: 4 G/DL (ref 3.4–5)
ALP SERPL-CCNC: 73 U/L (ref 40–150)
ALT SERPL W P-5'-P-CCNC: 27 U/L (ref 0–50)
ANION GAP SERPL CALCULATED.3IONS-SCNC: 9 MMOL/L (ref 3–14)
AST SERPL W P-5'-P-CCNC: 16 U/L (ref 0–45)
BILIRUB SERPL-MCNC: 1 MG/DL (ref 0.2–1.3)
BUN SERPL-MCNC: 12 MG/DL (ref 7–30)
CALCIUM SERPL-MCNC: 9.4 MG/DL (ref 8.5–10.1)
CHLORIDE SERPL-SCNC: 105 MMOL/L (ref 94–109)
CHOLEST SERPL-MCNC: 152 MG/DL
CO2 SERPL-SCNC: 26 MMOL/L (ref 20–32)
CREAT SERPL-MCNC: 0.72 MG/DL (ref 0.52–1.04)
CREAT UR-MCNC: 21 MG/DL
GFR SERPL CREATININE-BSD FRML MDRD: 84 ML/MIN/1.7M2
GLUCOSE SERPL-MCNC: 120 MG/DL (ref 70–99)
HDLC SERPL-MCNC: 54 MG/DL
LDLC SERPL CALC-MCNC: 73 MG/DL
MICROALBUMIN UR-MCNC: 22 MG/L
MICROALBUMIN/CREAT UR: 104.81 MG/G CR (ref 0–25)
NONHDLC SERPL-MCNC: 98 MG/DL
POTASSIUM SERPL-SCNC: 3.9 MMOL/L (ref 3.4–5.3)
PROT SERPL-MCNC: 7.6 G/DL (ref 6.8–8.8)
SODIUM SERPL-SCNC: 140 MMOL/L (ref 133–144)
TRIGL SERPL-MCNC: 126 MG/DL
TSH SERPL DL<=0.005 MIU/L-ACNC: 4.28 MU/L (ref 0.4–4)

## 2018-02-19 PROCEDURE — 77067 SCR MAMMO BI INCL CAD: CPT

## 2018-02-19 PROCEDURE — 99396 PREV VISIT EST AGE 40-64: CPT | Performed by: PHYSICIAN ASSISTANT

## 2018-02-19 PROCEDURE — 82043 UR ALBUMIN QUANTITATIVE: CPT | Performed by: PHYSICIAN ASSISTANT

## 2018-02-19 PROCEDURE — 80053 COMPREHEN METABOLIC PANEL: CPT | Performed by: PHYSICIAN ASSISTANT

## 2018-02-19 PROCEDURE — 36415 COLL VENOUS BLD VENIPUNCTURE: CPT | Performed by: PHYSICIAN ASSISTANT

## 2018-02-19 PROCEDURE — 84443 ASSAY THYROID STIM HORMONE: CPT | Performed by: PHYSICIAN ASSISTANT

## 2018-02-19 PROCEDURE — 80061 LIPID PANEL: CPT | Performed by: PHYSICIAN ASSISTANT

## 2018-02-19 RX ORDER — GLUCOSAMINE/D3/BOSWELLIA SERRA 1500MG-400
TABLET ORAL
COMMUNITY
End: 2019-12-30

## 2018-02-19 RX ORDER — METOPROLOL SUCCINATE 100 MG/1
TABLET, EXTENDED RELEASE ORAL
Qty: 90 TABLET | Refills: 3 | Status: CANCELLED | OUTPATIENT
Start: 2018-02-19

## 2018-02-19 RX ORDER — SIMVASTATIN 40 MG
40 TABLET ORAL DAILY
Qty: 90 TABLET | Refills: 3 | Status: SHIPPED | OUTPATIENT
Start: 2018-02-19 | End: 2019-03-12

## 2018-02-19 RX ORDER — MUPIROCIN 20 MG/G
OINTMENT TOPICAL DAILY
COMMUNITY
End: 2019-07-05

## 2018-02-19 RX ORDER — LORAZEPAM 0.5 MG/1
.25-.5 TABLET ORAL EVERY 6 HOURS PRN
Qty: 90 TABLET | Refills: 0 | Status: SHIPPED | OUTPATIENT
Start: 2018-02-19 | End: 2019-03-12

## 2018-02-19 RX ORDER — METOPROLOL SUCCINATE 50 MG/1
TABLET, EXTENDED RELEASE ORAL
Qty: 180 TABLET | Refills: 3 | Status: SHIPPED | OUTPATIENT
Start: 2018-02-19 | End: 2018-09-19

## 2018-02-19 RX ORDER — IPRATROPIUM BROMIDE AND ALBUTEROL SULFATE 2.5; .5 MG/3ML; MG/3ML
SOLUTION RESPIRATORY (INHALATION)
Qty: 180 ML | Refills: 4 | Status: SHIPPED | OUTPATIENT
Start: 2018-02-19 | End: 2019-03-12

## 2018-02-19 RX ORDER — EUCALYPTUS/PEPPERMINT OIL
SOLUTION, NON-ORAL NASAL DAILY
COMMUNITY
End: 2022-01-27

## 2018-02-19 RX ORDER — ALBUTEROL SULFATE 90 UG/1
2 AEROSOL, METERED RESPIRATORY (INHALATION) EVERY 6 HOURS PRN
Qty: 3 INHALER | Refills: 3 | Status: SHIPPED | OUTPATIENT
Start: 2018-02-19 | End: 2019-03-12

## 2018-02-19 ASSESSMENT — ANXIETY QUESTIONNAIRES
2. NOT BEING ABLE TO STOP OR CONTROL WORRYING: SEVERAL DAYS
1. FEELING NERVOUS, ANXIOUS, OR ON EDGE: SEVERAL DAYS
5. BEING SO RESTLESS THAT IT IS HARD TO SIT STILL: NOT AT ALL
3. WORRYING TOO MUCH ABOUT DIFFERENT THINGS: NOT AT ALL
7. FEELING AFRAID AS IF SOMETHING AWFUL MIGHT HAPPEN: NOT AT ALL
6. BECOMING EASILY ANNOYED OR IRRITABLE: NOT AT ALL
IF YOU CHECKED OFF ANY PROBLEMS ON THIS QUESTIONNAIRE, HOW DIFFICULT HAVE THESE PROBLEMS MADE IT FOR YOU TO DO YOUR WORK, TAKE CARE OF THINGS AT HOME, OR GET ALONG WITH OTHER PEOPLE: NOT DIFFICULT AT ALL
GAD7 TOTAL SCORE: 2

## 2018-02-19 ASSESSMENT — PATIENT HEALTH QUESTIONNAIRE - PHQ9: 5. POOR APPETITE OR OVEREATING: NOT AT ALL

## 2018-02-19 ASSESSMENT — PAIN SCALES - GENERAL: PAINLEVEL: NO PAIN (0)

## 2018-02-19 NOTE — PROGRESS NOTES
SUBJECTIVE:   CC: Vy Villalobos is an 52 year old woman who presents for preventive health visit.     Physical   Annual:     Getting at least 3 servings of Calcium per day::  NO    Bi-annual eye exam::  NO    Dental care twice a year::  Yes    Sleep apnea or symptoms of sleep apnea::  None    Taking medications regularly::  Yes    Medication side effects::  Not applicable    Additional concerns today::  YES                Hyperlipidemia Follow-Up  Currently using simvastatin 40 mg daily.    Rate your low fat/cholesterol diet?: fair    Taking statin?  Yes, no muscle aches from statin    Other lipid medications/supplements?:  none    Hypertension Follow-up  Currently using metoprolol, prescription written for 100 mg of extended release, but there are days she cuts this in half as she feels lightheaded and dizzy as if she has hypotension, but has not been checking her pressures at the time.    Outpatient blood pressures are being checked at home.  Results are 140/86.    Low Salt Diet: low salt    Anxiety Follow-Up  Currently on Zoloft 50 mg daily along with as needed lorazepam-uses this very sparingly.    Status since last visit: No change    Other associated symptoms:None    Complicating factors:   Significant life event: No   Current substance abuse: None  Depression symptoms: Yes-    SYLVIA-7 SCORE 1/25/2016 2/19/2018   Total Score 3 2       SYLVIA-7  Asthma Follow-Up  Doing extremely well.  Using rare bronchodilator.  Was ACT completed today?    Yes    ACT Total Scores 2/19/2018   ACT TOTAL SCORE -   ASTHMA ER VISITS -   ASTHMA HOSPITALIZATIONS -   ACT TOTAL SCORE (Goal Greater than or Equal to 20) 24   In the past 12 months, how many times did you visit the emergency room for your asthma without being admitted to the hospital? 0   In the past 12 months, how many times were you hospitalized overnight because of your asthma? 0       Recent asthma triggers that patient is dealing with: pt is aware of triggers          Today's PHQ-2 Score:   PHQ-2 ( 1999 Pf[employer]) 2/19/2018   Q1: Little interest or pleasure in doing things 0   Q2: Feeling down, depressed or hopeless 0   PHQ-2 Score 0   Q1: Little interest or pleasure in doing things Not at all   Q2: Feeling down, depressed or hopeless Not at all   PHQ-2 Score 0       Abuse: Current or Past(Physical, Sexual or Emotional)- No  Do you feel safe in your environment - Yes    Social History   Substance Use Topics     Smoking status: Never Smoker     Smokeless tobacco: Never Used     Alcohol use No     Alcohol Use 2/19/2018   If you drink alcohol, do you typically have greater than 3 drinks per day OR greater than 7 drinks per week?   Not applicable   No flowsheet data found.    Reviewed orders with patient.  Reviewed health maintenance and updated orders accordingly - Yes  Patient Active Problem List   Diagnosis     Severe obesity (BMI 35.0-39.9) (H)     Cardiomegaly     Mitral valve disorder     Leiomyoma of uterus     Hyperlipidemia LDL goal <100     Hypertension goal BP (blood pressure) < 140/90     Snoring     Family history of malignant neoplasm of breast     Elevated TSH     SYLVIA (generalized anxiety disorder)     Abnormal fasting glucose     Mild intermittent asthma, uncomplicated     Past Surgical History:   Procedure Laterality Date     HC HYSTEROSCOPY W ENDOMETRIAL BX/POLYPECTOMY W/WO D&C  10/26/2000    Hysteroscopy, dilation and curettage, and diagnostic laparoscopy     HC REMOVAL OF OVARIAN CYST(S)      laparascopic ovarian cystectomy.     HYSTEROSCOPY  11/30/2007    Hysteroscopy, D&C and endometrial ablation.     TUBAL LIGATION      1990       Social History   Substance Use Topics     Smoking status: Never Smoker     Smokeless tobacco: Never Used     Alcohol use No     Family History   Problem Relation Age of Onset     Hypertension Mother      Arthritis Mother      Eye Disorder Mother      glaucoma     Lipids Mother      Hypertension Father      Eye Disorder Father      " glaucoma     Lipids Father      Respiratory Father      asthma     Breast Cancer Maternal Aunt       in her 40's     Alzheimer Disease Paternal Aunt      dx at 69     Eye Disorder Maternal Grandmother      glaucoma           Patient over age 50, mutual decision to screen reflected in health maintenance.    Pertinent mammograms are reviewed under the imaging tab.  History of abnormal Pap smear: NO - age 30-65 PAP every 5 years with negative HPV co-testing recommended    Reviewed and updated as needed this visit by clinical staff  Tobacco  Allergies  Meds  Problems         Reviewed and updated as needed this visit by Provider  Allergies  Meds  Problems            Review of Systems  C: NEGATIVE for fever, chills, change in weight  I: NEGATIVE for worrisome rashes, moles or lesions  E: NEGATIVE for vision changes or irritation  ENT: NEGATIVE for ear, mouth and throat problems  R: NEGATIVE for significant cough or SOB  B: NEGATIVE for masses, tenderness or discharge  CV: NEGATIVE for chest pain, palpitations or peripheral edema  GI: NEGATIVE for nausea, abdominal pain, heartburn, or change in bowel habits  : NEGATIVE for unusual urinary or vaginal symptoms. No vaginal bleeding.  M: NEGATIVE for significant arthralgias or myalgia  N: NEGATIVE for weakness, dizziness or paresthesias  E: NEGATIVE for temperature intolerance, skin/hair changes  H: NEGATIVE for bleeding problems  P: NEGATIVE for changes in mood or affect      OBJECTIVE:   /82  Pulse 89  Temp 98.7  F (37.1  C) (Tympanic)  Resp 18  Ht 5' 5\" (1.651 m)  Wt 234 lb (106.1 kg)  LMP 2017  SpO2 99%  BMI 38.94 kg/m2  Physical Exam  GENERAL APPEARANCE: healthy, alert and no distress  EYES: Eyes grossly normal to inspection, PERRL and conjunctivae and sclerae normal  HENT: ear canals and TM's normal, nose and mouth without ulcers or lesions, oropharynx clear and oral mucous membranes moist  NECK: no adenopathy, no asymmetry, masses, or " scars and thyroid normal to palpation  RESP: lungs clear to auscultation - no rales, rhonchi or wheezes  BREAST: patient declined breast exam  CV: regular rate and rhythm, normal S1 S2, no S3 or S4, no murmur, click or rub, no peripheral edema and peripheral pulses strong  ABDOMEN: soft, nontender, no hepatosplenomegaly, no masses and bowel sounds normal  MS: no musculoskeletal defects are noted and gait is age appropriate without ataxia  SKIN: no suspicious lesions or rashes  NEURO: Normal strength and tone, sensory exam grossly normal, mentation intact and speech normal  PSYCH: mentation appears normal and affect normal/bright    ASSESSMENT/PLAN:       ICD-10-CM    1. Encounter for routine adult medical exam with abnormal findings Z00.01    2. Mild intermittent asthma, uncomplicated J45.20 Asthma Action Plan (AAP)     albuterol (PROAIR HFA/PROVENTIL HFA/VENTOLIN HFA) 108 (90 BASE) MCG/ACT Inhaler     ipratropium - albuterol 0.5 mg/2.5 mg/3 mL (DUONEB) 0.5-2.5 (3) MG/3ML neb solution   3. SYLVIA (generalized anxiety disorder) F41.1 sertraline (ZOLOFT) 50 MG tablet   4. Hyperlipidemia LDL goal <100 E78.5 simvastatin (ZOCOR) 40 MG tablet     Lipid panel reflex to direct LDL Fasting     Comprehensive metabolic panel (BMP + Alb, Alk Phos, ALT, AST, Total. Bili, TP)   5. Hypertension goal BP (blood pressure) < 140/90 I10 metoprolol succinate (TOPROL-XL) 50 MG 24 hr tablet     Comprehensive metabolic panel (BMP + Alb, Alk Phos, ALT, AST, Total. Bili, TP)     Albumin Random Urine Quantitative with Creat Ratio   6. Anxiety attack F41.0 LORazepam (ATIVAN) 0.5 MG tablet   7. Special screening for malignant neoplasms, colon Z12.11 Fecal colorectal cancer screen (FIT)   8. Elevated TSH R94.6 TSH       COUNSELING:  Reviewed preventive health counseling, as reflected in patient instructions       Regular exercise       Healthy diet/nutrition       Osteoporosis Prevention/Bone Health       Colon cancer screening         reports that  "she has never smoked. She has never used smokeless tobacco.    Estimated body mass index is 38.94 kg/(m^2) as calculated from the following:    Height as of this encounter: 5' 5\" (1.651 m).    Weight as of this encounter: 234 lb (106.1 kg).   Weight management plan: Discussed healthy diet and exercise guidelines and patient will follow up in 12 months in clinic to re-evaluate.     REFILLED YEARLY CONSISTENT PRESCRIPTIONS:      Current Outpatient Prescriptions:      Biotin 32657 MCG TABS, , Disp: , Rfl:      mupirocin (BACTROBAN) 2 % ointment, Apply topically daily, Disp: , Rfl:      Misc Natural Product Nasal (PONARIS) SOLN, Spray in nostril daily Two drops to both nostrils at bedtime, Disp: , Rfl:      sertraline (ZOLOFT) 50 MG tablet, Take 1 tablet (50 mg) by mouth daily, Disp: 90 tablet, Rfl: 3     simvastatin (ZOCOR) 40 MG tablet, Take 1 tablet (40 mg) by mouth daily, Disp: 90 tablet, Rfl: 3     albuterol (PROAIR HFA/PROVENTIL HFA/VENTOLIN HFA) 108 (90 BASE) MCG/ACT Inhaler, Inhale 2 puffs into the lungs every 6 hours as needed for shortness of breath / dyspnea, Disp: 3 Inhaler, Rfl: 3     LORazepam (ATIVAN) 0.5 MG tablet, Take 0.5-1 tablets (0.25-0.5 mg) by mouth every 6 hours as needed for anxiety, Disp: 90 tablet, Rfl: 0     ipratropium - albuterol 0.5 mg/2.5 mg/3 mL (DUONEB) 0.5-2.5 (3) MG/3ML neb solution, TAKE 1 VIAL (3 MLS) BY NEBULIZATION EVERY 6 HOURS AS NEEDED FOR SHORTN ESS OF BREATH / DYSPNEA, Disp: 180 mL, Rfl: 4     metoprolol succinate (TOPROL-XL) 50 MG 24 hr tablet, 1-2 tablets daily, Disp: 180 tablet, Rfl: 3     FISH OIL, 2 tablets in am and 2 tablets in pm, Disp: , Rfl:      Multiple Vitamins-Minerals (CENTRUM PO), Take  by mouth., Disp: , Rfl:      CALCIUM-VITAMIN D PO, Take  by mouth. daily, Disp: , Rfl:      VITAMIN C, CALCIUM ASCORBATE, PO, Take  by mouth. daily, Disp: , Rfl:      [DISCONTINUED] sertraline (ZOLOFT) 50 MG tablet, Take 1 tablet (50 mg) by mouth daily Appointment needed for " additional refills., Disp: 30 tablet, Rfl: 0     fluconazole (DIFLUCAN) 200 MG tablet, 1 tablet day 1 then 1/2 tablet days 2-15. Use with flares of yeast infection on skin. (Patient not taking: Reported on 2/19/2018), Disp: 60 tablet, Rfl: 0     [DISCONTINUED] simvastatin (ZOCOR) 40 MG tablet, Take 1 tablet (40 mg) by mouth daily, Disp: 90 tablet, Rfl: 3     [DISCONTINUED] metoprolol (TOPROL-XL) 100 MG 24 hr tablet, TAKE 1 TABLET (100 MG) BY MOUTH DAILY (Patient taking differently: 50 mg TAKE 1 TABLET (100 MG) BY MOUTH DAILY), Disp: 90 tablet, Rfl: 3     [DISCONTINUED] ipratropium - albuterol 0.5 mg/2.5 mg/3 mL (DUONEB) 0.5-2.5 (3) MG/3ML neb solution, TAKE 1 VIAL (3 MLS) BY NEBULIZATION EVERY 6 HOURS AS NEEDED FOR SHORTN ESS OF BREATH / DYSPNEA, Disp: 180 mL, Rfl: 4     [DISCONTINUED] albuterol (PROAIR HFA/PROVENTIL HFA/VENTOLIN HFA) 108 (90 BASE) MCG/ACT Inhaler, Inhale 2 puffs into the lungs every 6 hours as needed for shortness of breath / dyspnea, Disp: 3 Inhaler, Rfl: 3    Patient will continue current OTC medications if listed above.       Counseling Resources:  ATP IV Guidelines  Pooled Cohorts Equation Calculator  Breast Cancer Risk Calculator  FRAX Risk Assessment  ICSI Preventive Guidelines  Dietary Guidelines for Americans, 2010  USDA's MyPlate  ASA Prophylaxis  Lung CA Screening    Brook Haro PA-C  [facility]    Orders Placed This Encounter     Asthma Action Plan (AAP)     Fecal colorectal cancer screen (FIT)     Lipid panel reflex to direct LDL Fasting     Comprehensive metabolic panel (BMP + Alb, Alk Phos, ALT, AST, Total. Bili, TP)     Albumin Random Urine Quantitative with Creat Ratio     TSH     Biotin 55123 MCG TABS     mupirocin (BACTROBAN) 2 % ointment     Misc Natural Product Nasal (PONARIS) SOLN     sertraline (ZOLOFT) 50 MG tablet     simvastatin (ZOCOR) 40 MG tablet     albuterol (PROAIR HFA/PROVENTIL HFA/VENTOLIN HFA) 108 (90 BASE) MCG/ACT Inhaler     LORazepam  (ATIVAN) 0.5 MG tablet     ipratropium - albuterol 0.5 mg/2.5 mg/3 mL (DUONEB) 0.5-2.5 (3) MG/3ML neb solution     metoprolol succinate (TOPROL-XL) 50 MG 24 hr tablet       AVS given to patient upon discharge today.  Electronically signed by Brook Haro PA-C  February 19, 2018  2:35 PM

## 2018-02-19 NOTE — MR AVS SNAPSHOT
After Visit Summary   2/19/2018    Vy Villalobos    MRN: [id number]           Patient Information     Date Of Birth          1965        Visit Information        Provider Department      2/19/2018 10:15 AM Brook Haro PA-C [facility]        Today's Diagnoses     Mild intermittent asthma, uncomplicated    -  1    SYLVIA (generalized anxiety disorder)        Hyperlipidemia LDL goal <100        Hypertension goal BP (blood pressure) < 140/90        Anxiety attack        Special screening for malignant neoplasms, colon          Care Instructions      Preventive Health Recommendations  Female Ages 50 - 64    Yearly exam: See your health care provider every year in order to  o Review health changes.   o Discuss preventive care.    o Review your medicines if your doctor has prescribed any.      Get a Pap test every three years (unless you have an abnormal result and your provider advises testing more often).    If you get Pap tests with HPV test, you only need to test every 5 years, unless you have an abnormal result.     You do not need a Pap test if your uterus was removed (hysterectomy) and you have not had cancer.    You should be tested each year for STDs (sexually transmitted diseases) if you're at risk.     Have a mammogram every 1 to 2 years.    Have a colonoscopy at age 50, or have a yearly FIT test (stool test). These exams screen for colon cancer.      Have a cholesterol test every 5 years, or more often if advised.    Have a diabetes test (fasting glucose) every three years. If you are at risk for diabetes, you should have this test more often.     If you are at risk for osteoporosis (brittle bone disease), think about having a bone density scan (DEXA).    Shots: Get a flu shot each year. Get a tetanus shot every 10 years.    Nutrition:     Eat at least 5 servings of fruits and vegetables each day.    Eat whole-grain bread, whole-wheat pasta and brown rice instead of white  "grains and rice.    Talk to your provider about Calcium and Vitamin D.     Lifestyle    Exercise at least 150 minutes a week (30 minutes a day, 5 days a week). This will help you control your weight and prevent disease.    Limit alcohol to one drink per day.    No smoking.     Wear sunscreen to prevent skin cancer.     See your dentist every six months for an exam and cleaning.    See your eye doctor every 1 to 2 years.            Follow-ups after your visit        Future tests that were ordered for you today     Open Future Orders        Priority Expected Expires Ordered    Fecal colorectal cancer screen (FIT) Routine 3/12/2018 5/14/2018 2/19/2018    MA Screen Bilateral w/Giovani Routine  1/23/2019 1/23/2018            Who to contact     If you have questions or need follow up information about today's clinic visit or your schedule please contact [facility] directly at [phone].  Normal or non-critical lab and imaging results will be communicated to you by MyChart, letter or phone within 4 business days after the clinic has received the results. If you do not hear from us within 7 days, please contact the clinic through [facility] or phone. If you have a critical or abnormal lab result, we will notify you by phone as soon as possible.  Submit refill requests through [employer] or call your pharmacy and they will forward the refill request to us. Please allow 3 business days for your refill to be completed.          Additional Information About Your Visit        [facility] Information     [employer] lets you send messages to your doctor, view your test results, renew your prescriptions, schedule appointments and more. To sign up, go to [email or web address] . Click on \"Log in\" on the left side of the screen, which will take you to the Welcome page. Then click on \"Sign up Now\" on the right side of the page.     You will be asked to enter the access code listed below, as well as some personal information. " "Please follow the directions to create your username and password.     Your access code is: [email or web address]  Expires: 2018 10:42 AM     Your access code will  in 90 days. If you need help or a new code, please call your [place] clinic or [phone].        Care EveryWhere ID     This is your Care EveryWhere ID. This could be used by other organizations to access your [place] medical records  [id number]        Your Vitals Were     Pulse Temperature Respirations Height Last Period Pulse Oximetry    89 98.7  F (37.1  C) (Tympanic) 18 5' 5\" (1.651 m) 2017 99%    BMI (Body Mass Index)                   38.94 kg/m2            Blood Pressure from Last 3 Encounters:   18 132/82   17 120/70   16 116/70    Weight from Last 3 Encounters:   18 234 lb (106.1 kg)   17 221 lb (100.2 kg)   16 213 lb (96.6 kg)              We Performed the Following     Asthma Action Plan (AAP)          Today's Medication Changes          These changes are accurate as of 18 10:42 AM.  If you have any questions, ask your nurse or doctor.               These medicines have changed or have updated prescriptions.        Dose/Directions    metoprolol succinate 50 MG 24 hr tablet   Commonly known as:  TOPROL-XL   This may have changed:    - medication strength  - additional instructions   Used for:  Hypertension goal BP (blood pressure) < 140/90   Changed by:  Brook Haro PA-C        1-2 tablets daily   Quantity:  180 tablet   Refills:  3       sertraline 50 MG tablet   Commonly known as:  ZOLOFT   This may have changed:  additional instructions   Used for:  SYLVIA (generalized anxiety disorder)   Changed by:  Brook Haro PA-C        Dose:  50 mg   Take 1 tablet (50 mg) by mouth daily   Quantity:  90 tablet   Refills:  3            Where to get your medicines      These medications were sent to [id number] - [place], [place] - [address]  [address], [place] [address]     " Phone:  [phone]     albuterol 108 (90 BASE) MCG/ACT Inhaler    ipratropium - albuterol 0.5 mg/2.5 mg/3 mL 0.5-2.5 (3) MG/3ML neb solution    metoprolol succinate 50 MG 24 hr tablet    sertraline 50 MG tablet    simvastatin 40 MG tablet         Some of these will need a paper prescription and others can be bought over the counter.  Ask your nurse if you have questions.     Bring a paper prescription for each of these medications     LORazepam 0.5 MG tablet                Primary Care Provider Office Phone # Fax #    Brook Haro PA-C [phone] [address] [place] [address]        Equal Access to Services     JAMAAL JAUREGUI : Hadii queta Ramires, waaxda nick, qaybta kaalmada alma, rao daniels. So [facility] [phone].    ATENCIÓN: Si habla español, tiene a souza disposición servicios gratuitos de asistencia lingüística. [facility] of [phone].    We comply with applicable federal civil rights laws and [place] laws. We do not discriminate on the basis of race, color, national origin, age, disability, sex, sexual orientation, or gender identity.            Thank you!     Thank you for choosing [facility]  for your care. Our goal is always to provide you with excellent care. Hearing back from our patients is one way we can continue to improve our services. Please take a few minutes to complete the written survey that you may receive in the mail after your visit with us. Thank you!             Your Updated Medication List - Protect others around you: Learn how to safely use, store and throw away your medicines at www.disposemymeds.org.          This list is accurate as of 2/19/18 10:42 AM.  Always use your most recent med list.                   Brand Name Dispense Instructions for use Diagnosis    albuterol 108 (90 BASE) MCG/ACT Inhaler    PROAIR HFA/PROVENTIL HFA/VENTOLIN HFA    3 Inhaler    Inhale 2 puffs into the lungs  every 6 hours as needed for shortness of breath / dyspnea    Mild intermittent asthma, uncomplicated       Biotin 24774 MCG Tabs           CALCIUM-VITAMIN D PO      Take  by mouth. daily        CENTRUM PO      Take  by mouth.        FISH OIL      2 tablets in am and 2 tablets in pm        fluconazole 200 MG tablet    DIFLUCAN    60 tablet    1 tablet day 1 then 1/2 tablet days 2-15. Use with flares of yeast infection on skin.    Tinea corporis       ipratropium - albuterol 0.5 mg/2.5 mg/3 mL 0.5-2.5 (3) MG/3ML neb solution    DUONEB    180 mL    TAKE 1 VIAL (3 MLS) BY NEBULIZATION EVERY 6 HOURS AS NEEDED FOR SHORTN ESS OF BREATH / DYSPNEA    Mild intermittent asthma, uncomplicated       LORazepam 0.5 MG tablet    ATIVAN    90 tablet    Take 0.5-1 tablets (0.25-0.5 mg) by mouth every 6 hours as needed for anxiety    Anxiety attack       metoprolol succinate 50 MG 24 hr tablet    TOPROL-XL    180 tablet    1-2 tablets daily    Hypertension goal BP (blood pressure) < 140/90       mupirocin 2 % ointment    BACTROBAN     Apply topically daily        PONARIS Soln      Spray in nostril daily Two drops to both nostrils at bedtime        sertraline 50 MG tablet    ZOLOFT    90 tablet    Take 1 tablet (50 mg) by mouth daily    SYLVIA (generalized anxiety disorder)       simvastatin 40 MG tablet    ZOCOR    90 tablet    Take 1 tablet (40 mg) by mouth daily    Hyperlipidemia LDL goal <100       VITAMIN C (CALCIUM ASCORBATE) PO      Take  by mouth. daily

## 2018-02-19 NOTE — LETTER
February 20, 2018      Vy Villalobos  [address] [address]        Dear ,    We are writing to inform you of your test results.    Please let her know that her urine test for protein is positive for microscopic protein.  Her blood studies for kidney function do look normal.  I think it would be best to start her on a low dose losartan 12.5 mg in addition to the beta-blocker she is on which will help her kidney function.  This is a blood pressure medication and will also help manage her pressure as well.  Side effect profile is low.  I would like to repeat a microalbumin in 2-3 months.   Fasting sugar is elevated at 120 would like to see this less than 100. Usually this increases with age due to weight gain and lack of activity/exercise. It is important to be exercising on a consistent basis, watching simple sugars and complex carbohydrates. One option would be meeting with our dietitian to discuss dietary and lifestyle management. If interested should contact us. Otherwise we should be checking this yearly with physicals-should come fasting for any blood work in the future.  Cholesterol panel is normal.  I did check a thyroid study and it is essentially normal.     Resulted Orders   Lipid panel reflex to direct LDL Fasting   Result Value Ref Range    Cholesterol 152 <200 mg/dL    Triglycerides 126 <150 mg/dL      Comment:      Fasting specimen    HDL Cholesterol 54 >49 mg/dL    LDL Cholesterol Calculated 73 <100 mg/dL      Comment:      Desirable:       <100 mg/dl    Non HDL Cholesterol 98 <130 mg/dL   Comprehensive metabolic panel (BMP + Alb, Alk Phos, ALT, AST, Total. Bili, TP)   Result Value Ref Range    Sodium 140 133 - 144 mmol/L    Potassium 3.9 3.4 - 5.3 mmol/L    Chloride 105 94 - 109 mmol/L    Carbon Dioxide 26 20 - 32 mmol/L    Anion Gap 9 3 - 14 mmol/L    Glucose 120 (H) 70 - 99 mg/dL      Comment:      Fasting specimen    Urea Nitrogen 12 7 - 30 mg/dL    Creatinine 0.72 0.52 - 1.04 mg/dL     GFR Estimate 84 >60 mL/min/1.7m2      Comment:      Non  GFR Calc    GFR Estimate If Black >90 >60 mL/min/1.7m2      Comment:       GFR Calc    Calcium 9.4 8.5 - 10.1 mg/dL    Bilirubin Total 1.0 0.2 - 1.3 mg/dL    Albumin 4.0 3.4 - 5.0 g/dL    Protein Total 7.6 6.8 - 8.8 g/dL    Alkaline Phosphatase 73 40 - 150 U/L    ALT 27 0 - 50 U/L    AST 16 0 - 45 U/L   Albumin Random Urine Quantitative with Creat Ratio   Result Value Ref Range    Creatinine Urine 21 mg/dL    Albumin Urine mg/L 22 mg/L    Albumin Urine mg/g Cr 104.81 (H) 0 - 25 mg/g Cr   TSH   Result Value Ref Range    TSH 4.28 (H) 0.40 - 4.00 mU/L       If you have any questions or concerns, please call the clinic at the number listed above.       Sincerely,        Brook Haro PA-C

## 2018-02-19 NOTE — NURSING NOTE
"Chief Complaint   Patient presents with     Physical       Initial /82  Pulse 89  Temp 98.7  F (37.1  C) (Tympanic)  Resp 18  Ht 5' 5\" (1.651 m)  Wt 234 lb (106.1 kg)  LMP 02/19/2017  SpO2 99%  BMI 38.94 kg/m2 Estimated body mass index is 38.94 kg/(m^2) as calculated from the following:    Height as of this encounter: 5' 5\" (1.651 m).    Weight as of this encounter: 234 lb (106.1 kg).  BP completed using cuff size: devon Mendieta MA      "

## 2018-02-20 DIAGNOSIS — R80.9 MICROALBUMINURIA: Primary | ICD-10-CM

## 2018-02-20 DIAGNOSIS — I10 HYPERTENSION GOAL BP (BLOOD PRESSURE) < 140/90: ICD-10-CM

## 2018-02-20 PROCEDURE — 82274 ASSAY TEST FOR BLOOD FECAL: CPT | Performed by: PHYSICIAN ASSISTANT

## 2018-02-20 RX ORDER — LOSARTAN POTASSIUM 25 MG/1
12.5 TABLET ORAL DAILY
Qty: 45 TABLET | Refills: 3 | Status: SHIPPED | OUTPATIENT
Start: 2018-02-20 | End: 2019-03-12

## 2018-02-20 ASSESSMENT — ASTHMA QUESTIONNAIRES: ACT_TOTALSCORE: 24

## 2018-02-20 ASSESSMENT — ANXIETY QUESTIONNAIRES: GAD7 TOTAL SCORE: 2

## 2018-02-20 NOTE — TELEPHONE ENCOUNTER
Called pt with the results and below msg.  Vangie Mendieta MA         Please let her know that her urine test for protein is positive for microscopic protein.  Her blood studies for kidney function do look normal.  I think it would be best to start her on a low dose losartan 12.5 mg in addition to the beta-blocker she is on which will help her kidney function.  This is a blood pressure medication and will also help manage her pressure as well.  Side effect profile is low.  I would like to repeat a microalbumin in 2-3 months.   Fasting sugar is elevated at 120 would like to see this less than 100. Usually this increases with age due to weight gain and lack of activity/exercise. It is important to be exercising on a consistent basis, watching simple sugars and complex carbohydrates. One option would be meeting with our dietitian to discuss dietary and lifestyle management. If interested should contact us. Otherwise we should be checking this yearly with physicals-should come fasting for any blood work in the future.  Cholesterol panel is normal.  I did check a thyroid study and it is essentially normal.

## 2018-02-20 NOTE — PROGRESS NOTES
Please let her know that her urine test for protein is positive for microscopic protein.  Her blood studies for kidney function do look normal.  I think it would be best to start her on a low dose losartan 12.5 mg in addition to the beta-blocker she is on which will help her kidney function.  This is a blood pressure medication and will also help manage her pressure as well.  Side effect profile is low.  I would like to repeat a microalbumin in 2-3 months.  Fasting sugar is elevated at 120 would like to see this less than 100. Usually this increases with age due to weight gain and lack of activity/exercise. It is important to be exercising on a consistent basis, watching simple sugars and complex carbohydrates. One option would be meeting with our dietitian to discuss dietary and lifestyle management. If interested should contact us. Otherwise we should be checking this yearly with physicals should come fasting for any blood work in the future.  Cholesterol panel is normal.  I did check a thyroid study and it is essentially normal.

## 2018-02-22 DIAGNOSIS — Z12.11 SPECIAL SCREENING FOR MALIGNANT NEOPLASMS, COLON: ICD-10-CM

## 2018-02-22 LAB — HEMOCCULT STL QL IA: NEGATIVE

## 2018-02-22 NOTE — PROGRESS NOTES
Please let this patient know the FIT testing for colon cancer screening was negative.  We will repeat yearly if she is choosing to avoid colonoscopy.

## 2018-02-23 ENCOUNTER — TELEPHONE (OUTPATIENT)
Dept: FAMILY MEDICINE | Facility: CLINIC | Age: 53
End: 2018-02-23

## 2018-02-23 NOTE — TELEPHONE ENCOUNTER
Reason for Call:  Other prescription    Detailed comments: Vy calls asking why Brook was having her take potassium.  She is asking if something is wrong with her kidneys?  And what is the best time to be taking it?    Pt is aware Brook is not in today    Phone Number Patient can be reached at: [phone]    Best Time: any    Can we leave a detailed message on this number? YES    Call taken on 2/23/2018 at 4:12 PM by Anika Araya

## 2018-02-23 NOTE — TELEPHONE ENCOUNTER
Spoke with pt and clarified she is not on potassium, but is on losartan which has potassium in the name when the pharmacy puts the label on and she was given this med for her blood pressure and protein in her urine. Pt advised she can take the medication either morning or night. Pt had no further questions. Kalyn Kim, CMA

## 2018-05-12 DIAGNOSIS — R80.9 PROTEIN IN URINE: ICD-10-CM

## 2018-05-12 LAB
CREAT UR-MCNC: 101 MG/DL
MICROALBUMIN UR-MCNC: 8 MG/L
MICROALBUMIN/CREAT UR: 7.68 MG/G CR (ref 0–25)

## 2018-05-12 PROCEDURE — 82043 UR ALBUMIN QUANTITATIVE: CPT | Performed by: PHYSICIAN ASSISTANT

## 2018-05-14 NOTE — PROGRESS NOTES
Please call with normal lab results.  We will repeat yearly with physicals.    Electronically signed by Brook Haro PA-C  5/14/2018

## 2018-09-18 DIAGNOSIS — I10 HYPERTENSION GOAL BP (BLOOD PRESSURE) < 140/90: ICD-10-CM

## 2018-09-18 NOTE — TELEPHONE ENCOUNTER
"Requested Prescriptions   Pending Prescriptions Disp Refills     metoprolol succinate (TOPROL-XL) 100 MG 24 hr tablet [Pharmacy Med Name: METOPROL  MG TAB ] 90 tablet 2    Last Written Prescription Date:  2/19/18  Last Fill Quantity: 180,  # refills: 3   Last office visit: 2/19/2018 with prescribing provider:  2/19/18   Future Office Visit:     Sig: TAKE 1 TABLET (100 MG) BY MOUTH DAILY    Beta-Blockers Protocol Passed    9/18/2018  3:23 PM       Passed - Blood pressure under 140/90 in past 12 months    BP Readings from Last 3 Encounters:   02/19/18 132/82   01/23/17 120/70   06/17/16 116/70                Passed - Patient is age 6 or older       Passed - Recent (12 mo) or future (30 days) visit within the authorizing provider's specialty    Patient had office visit in the last 12 months or has a visit in the next 30 days with authorizing provider or within the authorizing provider's specialty.  See \"Patient Info\" tab in inbasket, or \"Choose Columns\" in Meds & Orders section of the refill encounter.              "

## 2018-09-19 RX ORDER — METOPROLOL SUCCINATE 50 MG/1
TABLET, EXTENDED RELEASE ORAL
Qty: 90 TABLET | Refills: 2 | Status: SHIPPED | OUTPATIENT
Start: 2018-09-19 | End: 2019-07-05 | Stop reason: DRUGHIGH

## 2018-09-19 RX ORDER — METOPROLOL SUCCINATE 100 MG/1
TABLET, EXTENDED RELEASE ORAL
Qty: 90 TABLET | Refills: 2 | Status: SHIPPED | OUTPATIENT
Start: 2018-09-19 | End: 2019-03-12

## 2018-09-19 NOTE — TELEPHONE ENCOUNTER
We got a fax from the pharmacy stating that they will only cover one tab a day. So we needed to send in a higher dose (100 mg) or one tab a day of the 50 mg. I called pt to see how she is taking the metoprolol. She states that she was told by Brook that she can take one tab a day of the 50 mg and if her blood pressure was still high she could take another tab. She doesn't have to take 2 every often. I asked her if she was ok with us sending in 50 mg daily and if she has to she can take another one but to let us know if that happens to often cause she will run out of her meds early and if that's the case we will have to send in 100 mg tabs to take daily.   Vangie Mendieta MA

## 2018-09-26 NOTE — PROGRESS NOTES
Vy Villalobos is a 53 year old female who is seen in consultation at the request of .  History of Present illness:  Vy presents for evaluation of:  1.) b/l hand pain with locking fingers   Onset: August  Symptoms brought on by: nothing.   Character:  sharp, stabbing, numbness and radiation of pain up to wrist .  locking fingers  Progression of symptoms:  same.    Previous similar pain: no .   Pain Level:  3/10.   Previous treatments:  heat and ice.  Currently on Blood thinners? No  Diagnosis of Diabetes? No

## 2018-10-01 ENCOUNTER — RADIANT APPOINTMENT (OUTPATIENT)
Dept: GENERAL RADIOLOGY | Facility: CLINIC | Age: 53
End: 2018-10-01
Attending: ORTHOPAEDIC SURGERY
Payer: COMMERCIAL

## 2018-10-01 ENCOUNTER — OFFICE VISIT (OUTPATIENT)
Dept: ORTHOPEDICS | Facility: CLINIC | Age: 53
End: 2018-10-01
Payer: COMMERCIAL

## 2018-10-01 VITALS
WEIGHT: 233 LBS | SYSTOLIC BLOOD PRESSURE: 138 MMHG | DIASTOLIC BLOOD PRESSURE: 78 MMHG | BODY MASS INDEX: 38.82 KG/M2 | HEIGHT: 65 IN

## 2018-10-01 DIAGNOSIS — M65.342 TRIGGER FINGER, LEFT RING FINGER: Primary | ICD-10-CM

## 2018-10-01 DIAGNOSIS — M79.642 PAIN IN BOTH HANDS: ICD-10-CM

## 2018-10-01 DIAGNOSIS — M79.641 PAIN IN BOTH HANDS: ICD-10-CM

## 2018-10-01 DIAGNOSIS — M65.331 TRIGGER FINGER, RIGHT MIDDLE FINGER: ICD-10-CM

## 2018-10-01 PROCEDURE — 20550 NJX 1 TENDON SHEATH/LIGAMENT: CPT | Mod: F3 | Performed by: ORTHOPAEDIC SURGERY

## 2018-10-01 PROCEDURE — 73130 X-RAY EXAM OF HAND: CPT | Mod: TC

## 2018-10-01 PROCEDURE — 99203 OFFICE O/P NEW LOW 30 MIN: CPT | Mod: 25 | Performed by: ORTHOPAEDIC SURGERY

## 2018-10-01 PROCEDURE — 20550 NJX 1 TENDON SHEATH/LIGAMENT: CPT | Mod: F7 | Performed by: ORTHOPAEDIC SURGERY

## 2018-10-01 ASSESSMENT — PAIN SCALES - GENERAL: PAINLEVEL: MILD PAIN (3)

## 2018-10-01 NOTE — LETTER
10/1/2018         RE: Vy Villalobos  [address] [facility] [id number]        Dear Colleague,    Thank you for referring your patient, Vy Villalobos, to the [facility]. Please see a copy of my visit note below.    Vy Villalobos is a 53 year old female who is seen in consultation at the request of .  History of Present illness:  Vy presents for evaluation of:  1.) b/l hand pain with locking fingers   Onset: August  Symptoms brought on by: nothing.   Character:  sharp, stabbing, numbness and radiation of pain up to wrist .  locking fingers  Progression of symptoms:  same.    Previous similar pain: no .   Pain Level:  3/10.   Previous treatments:  heat and ice.  Currently on Blood thinners? No  Diagnosis of Diabetes? 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  She enjoys fishing.    She is being seen for   Chief Complaints and History of Present Illnesses   Patient presents with     Consult     b/l hand pain with locking fingers          History of Present Illness:   Vy Villalobos is a 53 year old female who is seen in consultation at the request of no one.  History of Present illness:  Vy presents for evaluation of:  1.) b/l hand pain with locking fingers.  It is right middle finger and left ring finger.  The left ring finger is worse than the right middle  Onset: August.  2 months  Symptoms brought on by: nothing.  No injury trauma or surgery to these digits  Character:  sharp, stabbing, numbness and radiation of pain up to wrist .  locking fingers.  The right one locks in the left one is more stiff.  Progression of symptoms:  same.    Previous similar pain: no.  Denies previous history of surgery injury or trauma to any of these digits.  Pain Level:  3/10.   Previous treatments:  heat and ice.  None of the heat or ice helped.  She has not tried any physical therapy or injections or  medications.  Currently on Blood thinners? No  Diagnosis of Diabetes? No    Additional History: Denies numbness or tingling.    Patient's past medical, surgical, social and family histories reviewed.     Past Medical History:   Diagnosis Date     Cardiomegaly 11/7/2007    Ventricular - see echo 10/07     Microalbuminuria 02/20/2018     Mild intermittent asthma     albuterol prn, prednisone as needed.     MITRAL VALVE DISORDER 11/7/2007    Regurg - mild - see echo 10/07         Past Surgical History:   Procedure Laterality Date     HC HYSTEROSCOPY W ENDOMETRIAL BX/POLYPECTOMY W/WO D&C  10/26/2000    Hysteroscopy, dilation and curettage, and diagnostic laparoscopy     HC REMOVAL OF OVARIAN CYST(S)      laparascopic ovarian cystectomy.     HYSTEROSCOPY  11/30/2007    Hysteroscopy, D&C and endometrial ablation.     TUBAL LIGATION      1990       Medications:    Current Outpatient Prescriptions on File Prior to Visit:  albuterol (PROAIR HFA/PROVENTIL HFA/VENTOLIN HFA) 108 (90 BASE) MCG/ACT Inhaler Inhale 2 puffs into the lungs every 6 hours as needed for shortness of breath / dyspnea   Biotin 76329 MCG TABS    CALCIUM-VITAMIN D PO Take  by mouth. daily   FISH OIL 2 tablets in am and 2 tablets in pm   fluconazole (DIFLUCAN) 200 MG tablet 1 tablet day 1 then 1/2 tablet days 2-15. Use with flares of yeast infection on skin. (Patient not taking: Reported on 2/19/2018)   ipratropium - albuterol 0.5 mg/2.5 mg/3 mL (DUONEB) 0.5-2.5 (3) MG/3ML neb solution TAKE 1 VIAL (3 MLS) BY NEBULIZATION EVERY 6 HOURS AS NEEDED FOR SHORTN ESS OF BREATH / DYSPNEA   LORazepam (ATIVAN) 0.5 MG tablet Take 0.5-1 tablets (0.25-0.5 mg) by mouth every 6 hours as needed for anxiety   losartan (COZAAR) 25 MG tablet Take 0.5 tablets (12.5 mg) by mouth daily   metoprolol succinate (TOPROL-XL) 100 MG 24 hr tablet TAKE 1 TABLET (100 MG) BY MOUTH DAILY   metoprolol succinate (TOPROL-XL) 50 MG 24 hr tablet Take one tab daily   Misc Natural Product Nasal  "(PONARIS) SOLN Spray in nostril daily Two drops to both nostrils at bedtime   Multiple Vitamins-Minerals (CENTRUM PO) Take  by mouth.   mupirocin (BACTROBAN) 2 % ointment Apply topically daily   sertraline (ZOLOFT) 50 MG tablet Take 1 tablet (50 mg) by mouth daily   simvastatin (ZOCOR) 40 MG tablet Take 1 tablet (40 mg) by mouth daily   VITAMIN C, CALCIUM ASCORBATE, PO Take  by mouth. daily     No current facility-administered medications on file prior to visit.     Allergies   Allergen Reactions     Doxycycline Itching     Miconazole Rash       Social History     Occupational History     [employer]     special ed     Social History Main Topics     Smoking status: Never Smoker     Smokeless tobacco: Never Used     Alcohol use No     Drug use: No     Sexual activity: Yes     Partners: Male     Birth control/ protection: Surgical      Comment: tubal       Family History   Problem Relation Age of Onset     Hypertension Mother      Arthritis Mother      Eye Disorder Mother      glaucoma     Lipids Mother      Hypertension Father      Eye Disorder Father      glaucoma     Lipids Father      Respiratory Father      asthma     Breast Cancer Maternal Aunt       in her 40's     Alzheimer Disease Paternal Aunt      dx at 69     Eye Disorder Maternal Grandmother      glaucoma       REVIEW OF SYSTEMS  10 point review systems performed otherwise negative as noted as per history of present illness.    Physical Exam:  Vitals: /78  Ht 1.651 m (5' 5\")  Wt 105.7 kg (233 lb)  LMP 2017  BMI 38.77 kg/m2  BMI= Body mass index is 38.77 kg/(m^2).    Constitutional: healthy, alert and no acute distress   Psychiatric: mentation appears normal and affect normal/bright  NEURO: no focal deficits, CMS intact bilateral upper extremities  RESP: Normal with easy respirations and no use of accessory muscles to breathe, no audible wheezing or retractions  CV: +2 radial pulses and his hands are warm to palpation.  "   SKIN: No erythema, rashes, excoriation, or breakdown. No evidence of infection.   MUSCULOSKELETAL:    INSPECTION of bilateral hands, specifically right middle finger and left ring finger: No gross deformities, erythema, edema, ecchymosis, atrophy or fasciculations.     PALPATION: No tenderness on palpation of the digits however there is some tenderness on palpation at the base of the right middle and left ring finger on the volar side, greater on the left than the right.  No increased warmth noted.  No tenderness to palpation of any of the other digits hands or wrists.    ROM: Full flexion and extension of the right middle finger however every once in a while the finger will get caught in the PIP joint will be stuck at about a 90 degree angle.  She can move it and get it to release.  On the left ring finger it only flexes to approximately 90 degrees and then cannot flex any further.  I can palpate what feels like a bulge at the base of the left ring finger on the volar side when the finger is flexed at 90 degrees at the PIP joint.     STRENGTH: 5 out of 5 extension and flexion of the digits mentioned above.  Positive locking with the right middle finger and positive decreased range of motion of the left ring but I do not get it actively locking.  Pain with locking on the right and the left has pain when tried to push past 90 degrees of flexion on the PIP joint.    SPECIAL TEST: None today.  GAIT: non-antalgic  Lymph: no palpable lymph nodes    Diagnostic Modalities:  X-rays done today of bilateral hands 3 views AP lateral and oblique showing no fracture no dislocation no tumor no arthritis.  Independent visualization of the images was performed.    Impression: 1.  Left ring trigger finger.  2.  Right middle trigger finger    Plan:  All of the above pertinent physical exam and imaging modalities findings was reviewed with Vy.      INJECTION PROCEDURE:  The patient was counseled about a trigger finger injections,  including discussion of risks (including infection), contents of the injections, rationale for performing the injections, and expected benefits of the injections. I 1st palpated where the finger was triggering. This was at the base of the finger on the volar side next to the palmar crease. The skin was prepped with alcohol and betadine and then utilizing sterile technique an injection of the bilateral right middle and left ring fingers were performed. I used mark chloride spray prior to doing the injections. I injected down to the tendon, I moved the finger with flexion and extension to make sure I was not in the tendon and then injected above the tendon. The injections consisted 1ml of Kenalog (40mg per 1ml) with 3ml 1% lidocaine plain. The patient tolerated the injections well, and there were no complications. The injection sites were covered with a Band-Aid. The injections were performed by ARVIN Lewis     Patient Instructions:  1.  We know that your right middle finger has been catching in your left ring finger has decreased range of motion and will not flex the whole way.  2.  In clinic you demonstrated trigger finger on the right and you have pain on the left right in that area.  3.  We discussed cortisone injections as an option.  We cannot guarantee that this will heal the problem, but we can give it a try.  4.  We also discussed surgery in the form of a trigger finger release.  We have information about that below.  5.  After further discussion we decided to go ahead with the cortisone injections and see if this helps.  6.  I did include surgery information below in case you want to proceed with this in the future.  7. You can followup with Alyssia Keen MD in 6 weeks, if you are having pain at that time we can do a cortisone injection.  You can always cancel the appointment if you are doing really well and follow-up as needed.  You can also call if you want to have trigger release surgery as we  went through all the information today.  You can schedule over the phone.  Re-x-ray on return: No    BP Readings from Last 1 Encounters:   10/01/18 138/78       BP noted to be well controlled today in office.      Patient does not use Tobacco products.    Scribed by [place] Green PA-C on 10/1/2018 at 10:40 AM, based on Dr. Alyssia Keen's statements to me.    This note was dictated with [employer].    IRINA Landeros MD      Again, thank you for allowing me to participate in the care of your patient.        Sincerely,        Alyssia Keen MD

## 2018-10-01 NOTE — MR AVS SNAPSHOT
After Visit Summary   10/1/2018    Vy Villalobos    MRN: [id number]           Patient Information     Date Of Birth          1965        Visit Information        Provider Department      10/1/2018 9:40 AM Alyssia Keen MD [facility]        Today's Diagnoses     Trigger finger, left ring finger    -  1    Trigger finger, right middle finger          Care Instructions    Encounter Diagnoses   Name Primary?     Trigger finger, right middle finger      Trigger finger, left ring finger Yes     Rest, ice and elevate above heart level as needed for pain control   1.  We know that your right middle finger has been catching in your left ring finger has decreased range of motion and will not flex the whole way.  2.  In clinic you demonstrated trigger finger on the right and you have pain on the left right in that area.  3.  We discussed cortisone injections as an option.  We cannot guarantee that this will heal the problem, but we can give it a try.  4.  We also discussed surgery in the form of a trigger finger release.  We have information about that below.  5.  After further discussion we decided to go ahead with the cortisone injections and see if this helps.  6.  I did include surgery information below in case you want to proceed with this in the future.  7. You can followup with Alyssia Keen MD in 6 weeks, if you are having pain at that time we can do a cortisone injection.  You can always cancel the appointment if you are doing really well and follow-up as needed.  Surgery Information:   1. Today we discussed surgery, the risks, benefits and alternatives. Risks are infection, bleeding or nerve issues, anesthesia problems. All of these are very rare.  2. The surgery would be a left Ring Digit Trigger Finger Release.  This means releasing the pulley that the tendon is getting caught on.  3. This surgery is a same day surgery, so you will go home the same day. Plan to have  some one drive you home.  4. You will need a Pre Operative History and Physical from Brook Haro PA-C or another provider prior to surgery. This needs to be within 30 days prior to surgery. We can help you set this up.  5.  After surgery he would need to let the incision heal for at least 2 weeks and then you could resume normal activity.  No heavy lifting prior to that.    Cortisone Instructions:     1. You received an injection of cortisone into your right middle finger and left ring finger today.  2. The joint(s) may be more painful for the first 1-2 days.  3. We ask you to continue to rest the joint(s) for a few more days before resuming regular activities.  4. Pain Medications you can take (as long as your primary care provider allows these meds and you do not have kidney or liver conditions):  Tylenol  Take 1000 mg by mouth every 6 hours as needed; maximum dose 4000 mg a day  Ibuprofen  600 mg every 6 hours as needed; maximum 2400 mg a day  (OK to take tylenol and ibuprofen at the same time)  5. Rest, ice and elevate as needed for pain control  6. Watch for these signs of infection: redness, swelling, drainage, warmth to touch, increased pain, or fever. Call the clinic or make an appointment to be seen if you think you have an infection.  7. If you are diabetic, make sure you keep a close eye on your blood sugars, they can get elevated with cortisone injections.   8. Sometimes it can take 1-2 weeks for it to reach its full effect.    Cortisone Injections  Cortisone is a type of steroid. It can greatly reduce swelling, redness, and irritation (inflammation) and pain. Being injected with cortisone is simple and doesn t take long. Your doctor may ask you questions about your health. Certain health conditions, such as diabetes, can be affected by cortisone.     Your pain may be relieved by a cortisone injection.   Why have a cortisone injection?  Injecting cortisone can relieve pain for anything from a  sports injury to arthritis. Your doctor may suggest an injection if rest, splints, or oral medicine doesn t relieve your pain. Injecting cortisone is simpler than having surgery. And cortisone may provide the lasting pain relief that can help you get out and enjoy life again.  Getting the injection  Your doctor will start by cleaning and occasionally numbing your skin at the injection site. Next you ll be injected with local anesthetics (for short-term pain relief) and cortisone. The injection may last a few moments. A small bandage will be put over the injection site. You ll then be ready to go home.  After your injection  After being injected, make sure you don t injure the treated region. But stay active. Enjoy a walk or some other mild activity. Just be careful not to strain the region that gave you trouble.  The next day  Some people feel more pain after being injected. This is normal, and it will go away soon. Applying ice for 20 minutes at a time to your injury may reduce the increased pain. Rest for the first day or two. You don t need to stay in bed. But avoid tasks that may strain the injured region.  If you have diabetes  Cortisone injections can cause blood sugar to be increased for several days after the injection. If you have diabetes, you should follow your blood  sugar closely during this time. Follow your regular plan for what to do when your blood sugar is elevated.     7753-3997 [employer]. [address] [place] [address]. All rights reserved. This information is not intended as a substitute for professional medical care. Always follow your healthcare professional's instructions.      Treating Trigger Finger     The tendon sheath is opened to release the tendon. Once the tendon can move freely again, the finger can bend and straighten more normally.     Trigger finger occurs when the tissue inside your finger or thumb becomes inflamed. Mild cases can be treated without  surgery. If the problem is severe, surgery may be needed. Your doctor will discuss your options with you.  Nonsurgical treatment  For mild symptoms, your doctor may have you rest the finger or thumb. You may also be told to take anti-inflammatory medicines. These include ibuprofen or aspirin. You may be given an injection of medicine in the base of the finger or thumb. This typically is a steroid, such as cortisone.  Surgery  If nonsurgical treatments don t ease your symptoms, surgery may be recommended. A tendon is a cordlike fiber that attaches muscle to bone and allows joints to bend. The tendon is surrounded by a protective cover called a sheath. During surgery, the sheath in your finger or thumb is opened to enlarge the space and release the swollen tendon. This allows the finger or thumb to bend and straighten normally. Surgery takes about 20 minutes. It can often be done using a local anesthetic. You may be able to go home the same day. Your hand will be wrapped in a soft bandage. You may need to wear a plaster splint for a short time to keep the finger or thumb still as it heals. The stitches will be removed in about 2 weeks. Your doctor can discuss the risks and benefits of surgery with you.  Date Last Reviewed: 9/21/2015 2000-2017 [employer]. [address] [place] [address], [place] [address]. All rights reserved. This information is not intended as a substitute for professional medical care. Always follow your healthcare professional's instructions.          [employer] and [employer] may offer reliable information regarding your diagnosis and treatment plan.    THANK YOU for coming in today. If you receive a survey via [employer] or mail please let us know if there was anything you especially appreciated today or if there is any way we can improve our clinic. We appreciate your input.    GENERAL INFORMATION:  Our hours are:  Monday :     Clinic 7:30 AM-430 PM ([facility])  Tuesday:       Operating Room All Day ([facility])  Wednesday: Clinic 7:30 AM - 11:15 AM ([facility])             Clinic 1:00 PM - 4:00PM ([facility])  Thursday:     Administrative Day  Friday:          Clinic 7:30 AM - 11:15 AM ([facility])            Clinic 1:00 PM - 4:00 PM ([facility])    [facility] for any issues or concerns: [phone]      We are not in the office Thursdays. Therefore non- urgent calls and medical messages received on Thursday will be addressed when we are back in the office on Wednesday. Urgent matters will be reviewed and addressed by one of our partners in the office as needed.    If lab work was done today as part of your evaluation you will generally be contacted via [employer], mail, or phone with the results within 1-5 days. If there is an alarming result we will contact you by phone. Lab results come back at varying times, I generally wait until all labs are resulted before making comments on results. Please note labs are automatically released to [employer] (if you have signed up for it) once available-at times you may see these prior to my having a chance to review them as well.    If you need refills please contact your pharmacist. They will send a refill request to me to review. Please allow 3 business days for us to process all refill requests. All narcotic refills should be handled in the clinic at the time of your visit.           Follow-ups after your visit        Who to contact     If you have questions or need follow up information about today's clinic visit or your schedule please contact [facility] directly at [phone].  Normal or non-critical lab and imaging results will be communicated to you by [employer], letter or phone within 4 business days after the clinic has received the results. If you do not hear from us within 7 days, please contact the clinic through  "Use[employer] System[employer] or phone. If you have a critical or abnormal lab result, we will notify you by phone as soon as possible.  Submit refill requests through [employer] or call your pharmacy and they will forward the refill request to us. Please allow 3 business days for your refill to be completed.          Additional Information About Your Visit        Useful Systemshart Information     [employer] lets you send messages to your doctor, view your test results, renew your prescriptions, schedule appointments and more. To sign up, go to [email or web address] . Click on \"Log in\" on the left side of the screen, which will take you to the Welcome page. Then click on \"Sign up Now\" on the right side of the page.     You will be asked to enter the access code listed below, as well as some personal information. Please follow the directions to create your username and password.     Your access code is: [email or web address]  Expires: 2018 10:04 AM     Your access code will  in 90 days. If you need help or a new code, please call your [facility] clinic or [phone].        Care EveryWhere ID     This is your Care EveryWhere ID. This could be used by other organizations to access your [facility] medical records  [id number]        Your Vitals Were     Height Last Period BMI (Body Mass Index)             1.651 m (5' 5\") 2017 38.77 kg/m2          Blood Pressure from Last 3 Encounters:   10/01/18 138/78   18 132/82   17 120/70    Weight from Last 3 Encounters:   10/01/18 105.7 kg (233 lb)   18 106.1 kg (234 lb)   17 100.2 kg (221 lb)               Primary Care Provider Office Phone # Fax #    Brook Haro PA-C [phone] [address] [place] [id number]        Equal Access to Services     [facility]: Rosa Ramires, waaxda luqadaha, qaybta kaalrao to . [facility] [phone].    ATENCIÓN: Si maris rivas, tiene a souza disposición " servicios gratuitos de asistencia lingüística. Sulaiman joshua [phone].    We comply with applicable federal civil rights laws and [place] laws. We do not discriminate on the basis of race, color, national origin, age, disability, sex, sexual orientation, or gender identity.            Thank you!     Thank you for choosing [facility]  for your care. Our goal is always to provide you with excellent care. Hearing back from our patients is one way we can continue to improve our services. Please take a few minutes to complete the written survey that you may receive in the mail after your visit with us. Thank you!             Your Updated Medication List - Protect others around you: Learn how to safely use, store and throw away your medicines at www.disposemymeds.org.          This list is accurate as of 10/1/18 10:46 AM.  Always use your most recent med list.                   Brand Name Dispense Instructions for use Diagnosis    albuterol 108 (90 Base) MCG/ACT inhaler    PROAIR HFA/PROVENTIL HFA/VENTOLIN HFA    3 Inhaler    Inhale 2 puffs into the lungs every 6 hours as needed for shortness of breath / dyspnea    Mild intermittent asthma, uncomplicated       Biotin 59692 MCG Tabs           CALCIUM-VITAMIN D PO      Take  by mouth. daily        CENTRUM PO      Take  by mouth.        FISH OIL      2 tablets in am and 2 tablets in pm        fluconazole 200 MG tablet    DIFLUCAN    60 tablet    1 tablet day 1 then 1/2 tablet days 2-15. Use with flares of yeast infection on skin.    Tinea corporis       ipratropium - albuterol 0.5 mg/2.5 mg/3 mL 0.5-2.5 (3) MG/3ML neb solution    DUONEB    180 mL    TAKE 1 VIAL (3 MLS) BY NEBULIZATION EVERY 6 HOURS AS NEEDED FOR SHORTN ESS OF BREATH / DYSPNEA    Mild intermittent asthma, uncomplicated       LORazepam 0.5 MG tablet    ATIVAN    90 tablet    Take 0.5-1 tablets (0.25-0.5 mg) by mouth every 6 hours as needed for anxiety    Anxiety attack       losartan 25 MG  tablet    COZAAR    45 tablet    Take 0.5 tablets (12.5 mg) by mouth daily    Microalbuminuria, Hypertension goal BP (blood pressure) < 140/90       * metoprolol succinate 100 MG 24 hr tablet    TOPROL-XL    90 tablet    TAKE 1 TABLET (100 MG) BY MOUTH DAILY    Hypertension goal BP (blood pressure) < 140/90       * metoprolol succinate 50 MG 24 hr tablet    TOPROL-XL    90 tablet    Take one tab daily    Hypertension goal BP (blood pressure) < 140/90       mupirocin 2 % ointment    BACTROBAN     Apply topically daily        PONARIS Soln      Spray in nostril daily Two drops to both nostrils at bedtime        sertraline 50 MG tablet    ZOLOFT    90 tablet    Take 1 tablet (50 mg) by mouth daily    SYLVIA (generalized anxiety disorder)       simvastatin 40 MG tablet    ZOCOR    90 tablet    Take 1 tablet (40 mg) by mouth daily    Hyperlipidemia LDL goal <100       VITAMIN C (CALCIUM ASCORBATE) PO      Take  by mouth. daily        * Notice:  This list has 2 medication(s) that are the same as other medications prescribed for you. Read the directions carefully, and ask your doctor or other care provider to review them with you.

## 2018-10-01 NOTE — PROGRESS NOTES
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  She enjoys fishing.    She is being seen for   Chief Complaints and History of Present Illnesses   Patient presents with     Consult     b/l hand pain with locking fingers          History of Present Illness:   Vy Villalobos is a 53 year old female who is seen in consultation at the request of no one.  History of Present illness:  Vy presents for evaluation of:  1.) b/l hand pain with locking fingers.  It is right middle finger and left ring finger.  The left ring finger is worse than the right middle  Onset: August.  2 months  Symptoms brought on by: nothing.  No injury trauma or surgery to these digits  Character:  sharp, stabbing, numbness and radiation of pain up to wrist .  locking fingers.  The right one locks in the left one is more stiff.  Progression of symptoms:  same.    Previous similar pain: no.  Denies previous history of surgery injury or trauma to any of these digits.  Pain Level:  3/10.   Previous treatments:  heat and ice.  None of the heat or ice helped.  She has not tried any physical therapy or injections or medications.  Currently on Blood thinners? No  Diagnosis of Diabetes? No    Additional History: Denies numbness or tingling.    Patient's past medical, surgical, social and family histories reviewed.     Past Medical History:   Diagnosis Date     Cardiomegaly 11/7/2007    Ventricular - see echo 10/07     Microalbuminuria 02/20/2018     Mild intermittent asthma     albuterol prn, prednisone as needed.     MITRAL VALVE DISORDER 11/7/2007    Regurg - mild - see echo 10/07         Past Surgical History:   Procedure Laterality Date     HC HYSTEROSCOPY W ENDOMETRIAL BX/POLYPECTOMY W/WO D&C  10/26/2000    Hysteroscopy, dilation and curettage, and diagnostic laparoscopy     HC REMOVAL OF OVARIAN CYST(S)      laparascopic ovarian cystectomy.     HYSTEROSCOPY   11/30/2007    Hysteroscopy, D&C and endometrial ablation.     TUBAL LIGATION      1990       Medications:    Current Outpatient Prescriptions on File Prior to Visit:  albuterol (PROAIR HFA/PROVENTIL HFA/VENTOLIN HFA) 108 (90 BASE) MCG/ACT Inhaler Inhale 2 puffs into the lungs every 6 hours as needed for shortness of breath / dyspnea   Biotin 37932 MCG TABS    CALCIUM-VITAMIN D PO Take  by mouth. daily   FISH OIL 2 tablets in am and 2 tablets in pm   fluconazole (DIFLUCAN) 200 MG tablet 1 tablet day 1 then 1/2 tablet days 2-15. Use with flares of yeast infection on skin. (Patient not taking: Reported on 2/19/2018)   ipratropium - albuterol 0.5 mg/2.5 mg/3 mL (DUONEB) 0.5-2.5 (3) MG/3ML neb solution TAKE 1 VIAL (3 MLS) BY NEBULIZATION EVERY 6 HOURS AS NEEDED FOR SHORTN ESS OF BREATH / DYSPNEA   LORazepam (ATIVAN) 0.5 MG tablet Take 0.5-1 tablets (0.25-0.5 mg) by mouth every 6 hours as needed for anxiety   losartan (COZAAR) 25 MG tablet Take 0.5 tablets (12.5 mg) by mouth daily   metoprolol succinate (TOPROL-XL) 100 MG 24 hr tablet TAKE 1 TABLET (100 MG) BY MOUTH DAILY   metoprolol succinate (TOPROL-XL) 50 MG 24 hr tablet Take one tab daily   Misc Natural Product Nasal (PONARIS) SOLN Spray in nostril daily Two drops to both nostrils at bedtime   Multiple Vitamins-Minerals (CENTRUM PO) Take  by mouth.   mupirocin (BACTROBAN) 2 % ointment Apply topically daily   sertraline (ZOLOFT) 50 MG tablet Take 1 tablet (50 mg) by mouth daily   simvastatin (ZOCOR) 40 MG tablet Take 1 tablet (40 mg) by mouth daily   VITAMIN C, CALCIUM ASCORBATE, PO Take  by mouth. daily     No current facility-administered medications on file prior to visit.     Allergies   Allergen Reactions     Doxycycline Itching     Miconazole Rash       Social History     Occupational History     [employer]     special ed     Social History Main Topics     Smoking status: Never Smoker     Smokeless tobacco: Never Used     Alcohol use No     Drug  "use: No     Sexual activity: Yes     Partners: Male     Birth control/ protection: Surgical      Comment: tubal       Family History   Problem Relation Age of Onset     Hypertension Mother      Arthritis Mother      Eye Disorder Mother      glaucoma     Lipids Mother      Hypertension Father      Eye Disorder Father      glaucoma     Lipids Father      Respiratory Father      asthma     Breast Cancer Maternal Aunt       in her 40's     Alzheimer Disease Paternal Aunt      dx at 69     Eye Disorder Maternal Grandmother      glaucoma       REVIEW OF SYSTEMS  10 point review systems performed otherwise negative as noted as per history of present illness.    Physical Exam:  Vitals: /78  Ht 1.651 m (5' 5\")  Wt 105.7 kg (233 lb)  LMP 2017  BMI 38.77 kg/m2  BMI= Body mass index is 38.77 kg/(m^2).    Constitutional: healthy, alert and no acute distress   Psychiatric: mentation appears normal and affect normal/bright  NEURO: no focal deficits, CMS intact bilateral upper extremities  RESP: Normal with easy respirations and no use of accessory muscles to breathe, no audible wheezing or retractions  CV: +2 radial pulses and his hands are warm to palpation.    SKIN: No erythema, rashes, excoriation, or breakdown. No evidence of infection.   MUSCULOSKELETAL:    INSPECTION of bilateral hands, specifically right middle finger and left ring finger: No gross deformities, erythema, edema, ecchymosis, atrophy or fasciculations.     PALPATION: No tenderness on palpation of the digits however there is some tenderness on palpation at the base of the right middle and left ring finger on the volar side, greater on the left than the right.  No increased warmth noted.  No tenderness to palpation of any of the other digits hands or wrists.    ROM: Full flexion and extension of the right middle finger however every once in a while the finger will get caught in the PIP joint will be stuck at about a 90 degree angle.  She can " move it and get it to release.  On the left ring finger it only flexes to approximately 90 degrees and then cannot flex any further.  I can palpate what feels like a bulge at the base of the left ring finger on the volar side when the finger is flexed at 90 degrees at the PIP joint.     STRENGTH: 5 out of 5 extension and flexion of the digits mentioned above.  Positive locking with the right middle finger and positive decreased range of motion of the left ring but I do not get it actively locking.  Pain with locking on the right and the left has pain when tried to push past 90 degrees of flexion on the PIP joint.    SPECIAL TEST: None today.  GAIT: non-antalgic  Lymph: no palpable lymph nodes    Diagnostic Modalities:  X-rays done today of bilateral hands 3 views AP lateral and oblique showing no fracture no dislocation no tumor no arthritis.  Independent visualization of the images was performed.    Impression: 1.  Left ring trigger finger.  2.  Right middle trigger finger    Plan:  All of the above pertinent physical exam and imaging modalities findings was reviewed with Vy.      INJECTION PROCEDURE:  The patient was counseled about a trigger finger injections, including discussion of risks (including infection), contents of the injections, rationale for performing the injections, and expected benefits of the injections. I 1st palpated where the finger was triggering. This was at the base of the finger on the volar side next to the palmar crease. The skin was prepped with alcohol and betadine and then utilizing sterile technique an injection of the bilateral right middle and left ring fingers were performed. I used mark chloride spray prior to doing the injections. I injected down to the tendon, I moved the finger with flexion and extension to make sure I was not in the tendon and then injected above the tendon. The injections consisted 1ml of Kenalog (40mg per 1ml) with 3ml 1% lidocaine plain. The patient  tolerated the injections well, and there were no complications. The injection sites were covered with a Band-Aid. The injections were performed by ARVIN Lewis     Patient Instructions:  1.  We know that your right middle finger has been catching in your left ring finger has decreased range of motion and will not flex the whole way.  2.  In clinic you demonstrated trigger finger on the right and you have pain on the left right in that area.  3.  We discussed cortisone injections as an option.  We cannot guarantee that this will heal the problem, but we can give it a try.  4.  We also discussed surgery in the form of a trigger finger release.  We have information about that below.  5.  After further discussion we decided to go ahead with the cortisone injections and see if this helps.  6.  I did include surgery information below in case you want to proceed with this in the future.  7. You can followup with Alyssia Keen MD in 6 weeks, if you are having pain at that time we can do a cortisone injection.  You can always cancel the appointment if you are doing really well and follow-up as needed.  You can also call if you want to have trigger release surgery as we went through all the information today.  You can schedule over the phone.  Re-x-ray on return: No    BP Readings from Last 1 Encounters:   10/01/18 138/78       BP noted to be well controlled today in office.      Patient does not use Tobacco products.    Scribed by [place] PA-C on 10/1/2018 at 10:40 AM, based on Dr. Alyssia Keen's statements to me.    This note was dictated with [employer].    IRINA Landeros MD

## 2018-10-01 NOTE — PATIENT INSTRUCTIONS
Encounter Diagnoses   Name Primary?     Trigger finger, right middle finger      Trigger finger, left ring finger Yes     Rest, ice and elevate above heart level as needed for pain control   1.  We know that your right middle finger has been catching in your left ring finger has decreased range of motion and will not flex the whole way.  2.  In clinic you demonstrated trigger finger on the right and you have pain on the left right in that area.  3.  We discussed cortisone injections as an option.  We cannot guarantee that this will heal the problem, but we can give it a try.  4.  We also discussed surgery in the form of a trigger finger release.  We have information about that below.  5.  After further discussion we decided to go ahead with the cortisone injections and see if this helps.  6.  I did include surgery information below in case you want to proceed with this in the future.  7. You can followup with Alyssia Keen MD in 6 weeks, if you are having pain at that time we can do a cortisone injection.  You can always cancel the appointment if you are doing really well and follow-up as needed.  Surgery Information:   1. Today we discussed surgery, the risks, benefits and alternatives. Risks are infection, bleeding or nerve issues, anesthesia problems. All of these are very rare.  2. The surgery would be a left Ring Digit Trigger Finger Release.  This means releasing the pulley that the tendon is getting caught on.  3. This surgery is a same day surgery, so you will go home the same day. Plan to have some one drive you home.  4. You will need a Pre Operative History and Physical from Brook Haro PA-C or another provider prior to surgery. This needs to be within 30 days prior to surgery. We can help you set this up.  5.  After surgery he would need to let the incision heal for at least 2 weeks and then you could resume normal activity.  No heavy lifting prior to that.    Cortisone Instructions:     1.  You received an injection of cortisone into your right middle finger and left ring finger today.  2. The joint(s) may be more painful for the first 1-2 days.  3. We ask you to continue to rest the joint(s) for a few more days before resuming regular activities.  4. Pain Medications you can take (as long as your primary care provider allows these meds and you do not have kidney or liver conditions):  Tylenol  Take 1000 mg by mouth every 6 hours as needed; maximum dose 4000 mg a day  Ibuprofen  600 mg every 6 hours as needed; maximum 2400 mg a day  (OK to take tylenol and ibuprofen at the same time)  5. Rest, ice and elevate as needed for pain control  6. Watch for these signs of infection: redness, swelling, drainage, warmth to touch, increased pain, or fever. Call the clinic or make an appointment to be seen if you think you have an infection.  7. If you are diabetic, make sure you keep a close eye on your blood sugars, they can get elevated with cortisone injections.   8. Sometimes it can take 1-2 weeks for it to reach its full effect.    Cortisone Injections  Cortisone is a type of steroid. It can greatly reduce swelling, redness, and irritation (inflammation) and pain. Being injected with cortisone is simple and doesn t take long. Your doctor may ask you questions about your health. Certain health conditions, such as diabetes, can be affected by cortisone.     Your pain may be relieved by a cortisone injection.   Why have a cortisone injection?  Injecting cortisone can relieve pain for anything from a sports injury to arthritis. Your doctor may suggest an injection if rest, splints, or oral medicine doesn t relieve your pain. Injecting cortisone is simpler than having surgery. And cortisone may provide the lasting pain relief that can help you get out and enjoy life again.  Getting the injection  Your doctor will start by cleaning and occasionally numbing your skin at the injection site. Next you ll be injected  with local anesthetics (for short-term pain relief) and cortisone. The injection may last a few moments. A small bandage will be put over the injection site. You ll then be ready to go home.  After your injection  After being injected, make sure you don t injure the treated region. But stay active. Enjoy a walk or some other mild activity. Just be careful not to strain the region that gave you trouble.  The next day  Some people feel more pain after being injected. This is normal, and it will go away soon. Applying ice for 20 minutes at a time to your injury may reduce the increased pain. Rest for the first day or two. You don t need to stay in bed. But avoid tasks that may strain the injured region.  If you have diabetes  Cortisone injections can cause blood sugar to be increased for several days after the injection. If you have diabetes, you should follow your blood  sugar closely during this time. Follow your regular plan for what to do when your blood sugar is elevated.     8298-2964 [employer]. [address] [place] [address]. All rights reserved. This information is not intended as a substitute for professional medical care. Always follow your healthcare professional's instructions.      Treating Trigger Finger     The tendon sheath is opened to release the tendon. Once the tendon can move freely again, the finger can bend and straighten more normally.     Trigger finger occurs when the tissue inside your finger or thumb becomes inflamed. Mild cases can be treated without surgery. If the problem is severe, surgery may be needed. Your doctor will discuss your options with you.  Nonsurgical treatment  For mild symptoms, your doctor may have you rest the finger or thumb. You may also be told to take anti-inflammatory medicines. These include ibuprofen or aspirin. You may be given an injection of medicine in the base of the finger or thumb. This typically is a steroid, such as  cortisone.  Surgery  If nonsurgical treatments don t ease your symptoms, surgery may be recommended. A tendon is a cordlike fiber that attaches muscle to bone and allows joints to bend. The tendon is surrounded by a protective cover called a sheath. During surgery, the sheath in your finger or thumb is opened to enlarge the space and release the swollen tendon. This allows the finger or thumb to bend and straighten normally. Surgery takes about 20 minutes. It can often be done using a local anesthetic. You may be able to go home the same day. Your hand will be wrapped in a soft bandage. You may need to wear a plaster splint for a short time to keep the finger or thumb still as it heals. The stitches will be removed in about 2 weeks. Your doctor can discuss the risks and benefits of surgery with you.  Date Last Reviewed: 9/21/2015 2000-2017 [employer]. [address] [place] [address]. All rights reserved. This information is not intended as a substitute for professional medical care. Always follow your healthcare professional's instructions.          [employer] and [employer] may offer reliable information regarding your diagnosis and treatment plan.    THANK YOU for coming in today. If you receive a survey via [employer] or mail please let us know if there was anything you especially appreciated today or if there is any way we can improve our clinic. We appreciate your input.    GENERAL INFORMATION:  Our hours are:  Monday :     Clinic 7:30 AM-430 PM ([facility])  Tuesday:      Operating Room All Day ([facility])  Wednesday: Clinic 7:30 AM - 11:15 AM ([facility])             Clinic 1:00 PM - 4:00PM ([facility])  Thursday:     Administrative Day  Friday:          Clinic 7:30 AM - 11:15 AM ([facility])            Clinic 1:00 PM - 4:00 PM ([facility])    [facility] for any  issues or concerns: [phone]      We are not in the office Thursdays. Therefore non- urgent calls and medical messages received on Thursday will be addressed when we are back in the office on Wednesday. Urgent matters will be reviewed and addressed by one of our partners in the office as needed.    If lab work was done today as part of your evaluation you will generally be contacted via [employer], mail, or phone with the results within 1-5 days. If there is an alarming result we will contact you by phone. Lab results come back at varying times, I generally wait until all labs are resulted before making comments on results. Please note labs are automatically released to [employer] (if you have signed up for it) once available-at times you may see these prior to my having a chance to review them as well.    If you need refills please contact your pharmacist. They will send a refill request to me to review. Please allow 3 business days for us to process all refill requests. All narcotic refills should be handled in the clinic at the time of your visit.

## 2019-03-12 ENCOUNTER — OFFICE VISIT (OUTPATIENT)
Dept: FAMILY MEDICINE | Facility: CLINIC | Age: 54
End: 2019-03-12
Payer: COMMERCIAL

## 2019-03-12 ENCOUNTER — HOSPITAL ENCOUNTER (OUTPATIENT)
Dept: GENERAL RADIOLOGY | Facility: CLINIC | Age: 54
End: 2019-03-12
Attending: OBSTETRICS & GYNECOLOGY
Payer: COMMERCIAL

## 2019-03-12 ENCOUNTER — HOSPITAL ENCOUNTER (OUTPATIENT)
Dept: MAMMOGRAPHY | Facility: CLINIC | Age: 54
Discharge: HOME | End: 2019-03-12
Attending: OBSTETRICS & GYNECOLOGY | Admitting: OBSTETRICS & GYNECOLOGY
Payer: COMMERCIAL

## 2019-03-12 VITALS
TEMPERATURE: 98.6 F | WEIGHT: 242 LBS | DIASTOLIC BLOOD PRESSURE: 76 MMHG | OXYGEN SATURATION: 99 % | HEIGHT: 65 IN | SYSTOLIC BLOOD PRESSURE: 134 MMHG | BODY MASS INDEX: 40.32 KG/M2 | RESPIRATION RATE: 18 BRPM | HEART RATE: 62 BPM

## 2019-03-12 DIAGNOSIS — M25.561 ACUTE PAIN OF RIGHT KNEE: ICD-10-CM

## 2019-03-12 DIAGNOSIS — E78.5 HYPERLIPIDEMIA LDL GOAL <100: ICD-10-CM

## 2019-03-12 DIAGNOSIS — E66.01 SEVERE OBESITY WITH BODY MASS INDEX (BMI) OF 35.0 TO 39.9 WITH SERIOUS COMORBIDITY (H): ICD-10-CM

## 2019-03-12 DIAGNOSIS — J45.20 MILD INTERMITTENT ASTHMA, UNCOMPLICATED: ICD-10-CM

## 2019-03-12 DIAGNOSIS — Z12.31 VISIT FOR SCREENING MAMMOGRAM: ICD-10-CM

## 2019-03-12 DIAGNOSIS — F41.1 GAD (GENERALIZED ANXIETY DISORDER): ICD-10-CM

## 2019-03-12 DIAGNOSIS — J45.40 MODERATE PERSISTENT ASTHMA WITHOUT COMPLICATION: ICD-10-CM

## 2019-03-12 DIAGNOSIS — R80.9 MICROALBUMINURIA: ICD-10-CM

## 2019-03-12 DIAGNOSIS — F33.0 MILD RECURRENT MAJOR DEPRESSION (H): ICD-10-CM

## 2019-03-12 DIAGNOSIS — I10 HYPERTENSION GOAL BP (BLOOD PRESSURE) < 140/90: ICD-10-CM

## 2019-03-12 DIAGNOSIS — I10 BENIGN ESSENTIAL HYPERTENSION: Primary | ICD-10-CM

## 2019-03-12 LAB
ANION GAP SERPL CALCULATED.3IONS-SCNC: 6 MMOL/L (ref 3–14)
BUN SERPL-MCNC: 13 MG/DL (ref 7–30)
CALCIUM SERPL-MCNC: 9.3 MG/DL (ref 8.5–10.1)
CHLORIDE SERPL-SCNC: 108 MMOL/L (ref 94–109)
CHOLEST SERPL-MCNC: 165 MG/DL
CO2 SERPL-SCNC: 29 MMOL/L (ref 20–32)
CREAT SERPL-MCNC: 0.76 MG/DL (ref 0.52–1.04)
GFR SERPL CREATININE-BSD FRML MDRD: 89 ML/MIN/{1.73_M2}
GLUCOSE SERPL-MCNC: 112 MG/DL (ref 70–99)
HDLC SERPL-MCNC: 52 MG/DL
LDLC SERPL CALC-MCNC: 71 MG/DL
NONHDLC SERPL-MCNC: 113 MG/DL
POTASSIUM SERPL-SCNC: 4.3 MMOL/L (ref 3.4–5.3)
SODIUM SERPL-SCNC: 143 MMOL/L (ref 133–144)
TRIGL SERPL-MCNC: 208 MG/DL

## 2019-03-12 PROCEDURE — 80061 LIPID PANEL: CPT | Performed by: OBSTETRICS & GYNECOLOGY

## 2019-03-12 PROCEDURE — 77063 BREAST TOMOSYNTHESIS BI: CPT

## 2019-03-12 PROCEDURE — 36415 COLL VENOUS BLD VENIPUNCTURE: CPT | Performed by: OBSTETRICS & GYNECOLOGY

## 2019-03-12 PROCEDURE — 99214 OFFICE O/P EST MOD 30 MIN: CPT | Performed by: OBSTETRICS & GYNECOLOGY

## 2019-03-12 PROCEDURE — 73564 X-RAY EXAM KNEE 4 OR MORE: CPT | Mod: TC

## 2019-03-12 PROCEDURE — 80048 BASIC METABOLIC PNL TOTAL CA: CPT | Performed by: OBSTETRICS & GYNECOLOGY

## 2019-03-12 RX ORDER — SIMVASTATIN 40 MG
40 TABLET ORAL DAILY
Qty: 90 TABLET | Refills: 3 | Status: SHIPPED | OUTPATIENT
Start: 2019-03-12 | End: 2019-05-29

## 2019-03-12 RX ORDER — LOSARTAN POTASSIUM 25 MG/1
12.5 TABLET ORAL DAILY
Qty: 45 TABLET | Refills: 3 | Status: SHIPPED | OUTPATIENT
Start: 2019-03-12 | End: 2019-12-30

## 2019-03-12 RX ORDER — METOPROLOL SUCCINATE 100 MG/1
100 TABLET, EXTENDED RELEASE ORAL DAILY
Qty: 90 TABLET | Refills: 3 | Status: SHIPPED | OUTPATIENT
Start: 2019-03-12 | End: 2019-12-30

## 2019-03-12 RX ORDER — IPRATROPIUM BROMIDE AND ALBUTEROL SULFATE 2.5; .5 MG/3ML; MG/3ML
SOLUTION RESPIRATORY (INHALATION)
Qty: 180 ML | Refills: 4 | Status: SHIPPED | OUTPATIENT
Start: 2019-03-12 | End: 2021-02-15

## 2019-03-12 RX ORDER — ALBUTEROL SULFATE 90 UG/1
2 AEROSOL, METERED RESPIRATORY (INHALATION) EVERY 6 HOURS PRN
Qty: 3 INHALER | Refills: 3 | Status: SHIPPED | OUTPATIENT
Start: 2019-03-12 | End: 2019-12-30

## 2019-03-12 ASSESSMENT — ANXIETY QUESTIONNAIRES
IF YOU CHECKED OFF ANY PROBLEMS ON THIS QUESTIONNAIRE, HOW DIFFICULT HAVE THESE PROBLEMS MADE IT FOR YOU TO DO YOUR WORK, TAKE CARE OF THINGS AT HOME, OR GET ALONG WITH OTHER PEOPLE: NOT DIFFICULT AT ALL
1. FEELING NERVOUS, ANXIOUS, OR ON EDGE: NOT AT ALL
3. WORRYING TOO MUCH ABOUT DIFFERENT THINGS: NOT AT ALL
GAD7 TOTAL SCORE: 0
6. BECOMING EASILY ANNOYED OR IRRITABLE: NOT AT ALL
7. FEELING AFRAID AS IF SOMETHING AWFUL MIGHT HAPPEN: NOT AT ALL
2. NOT BEING ABLE TO STOP OR CONTROL WORRYING: NOT AT ALL
5. BEING SO RESTLESS THAT IT IS HARD TO SIT STILL: NOT AT ALL

## 2019-03-12 ASSESSMENT — PATIENT HEALTH QUESTIONNAIRE - PHQ9
5. POOR APPETITE OR OVEREATING: NOT AT ALL
SUM OF ALL RESPONSES TO PHQ QUESTIONS 1-9: 0

## 2019-03-12 ASSESSMENT — PAIN SCALES - GENERAL: PAINLEVEL: MODERATE PAIN (5)

## 2019-03-12 ASSESSMENT — MIFFLIN-ST. JEOR: SCORE: 1703.58

## 2019-03-12 NOTE — PROGRESS NOTES
Subjective:  Here for medication refill and has multiple concerns:    1.  She has hypertension and takes losartan as well as metoprolol for this.  She needs refills of both medications.  Her blood pressures have been stable and she denies any side effects on the medications.    2.  She needs a refill of the medication she takes for hyperlipidemia.  It is simvastatin.  She has tolerated it well.  No muscle aches.    3.  She has depression.  It has been stable on Zoloft 50 mg daily.  She needs a refill.  PHQ 9 equals0    SYLVIA= 0    there has been no suicidal ideation.  Overall she feels quite well.    4.  She has asthma and needs refill of her albuterol inhaler.  Her asthma has been very stable.    5.  She has right knee pain for the last month.  She knelt on the ice and ever since then her knee has been sore.  It does not lock but occasionally it gives out.  It did swell on the medial aspect.  The swelling has resolved now.  Her pain is along the medial joint line.  We will order the Dr Hawkins x rays and refer her to Dr. Hawkins for consultation about definitive management/ possible more evaluation.    6.  She is fasting today and would like some lab work done.      The past medical history, social history, past surgical history and family history as shown below have been reviewed by me today.  Past Medical History:   Diagnosis Date     Cardiomegaly 11/7/2007    Ventricular - see echo 10/07     Microalbuminuria 02/20/2018     Mild intermittent asthma     albuterol prn, prednisone as needed.     MITRAL VALVE DISORDER 11/7/2007    Regurg - mild - see echo 10/07        Allergies   Allergen Reactions     Doxycycline Itching     Miconazole Rash     Current Outpatient Medications   Medication Sig Dispense Refill     albuterol (PROAIR HFA/PROVENTIL HFA/VENTOLIN HFA) 108 (90 BASE) MCG/ACT Inhaler Inhale 2 puffs into the lungs every 6 hours as needed for shortness of breath / dyspnea 3 Inhaler 3     Biotin 98530 MCG TABS         CALCIUM-VITAMIN D PO Take  by mouth. daily       losartan (COZAAR) 25 MG tablet Take 0.5 tablets (12.5 mg) by mouth daily 45 tablet 3     metoprolol succinate (TOPROL-XL) 100 MG 24 hr tablet TAKE 1 TABLET (100 MG) BY MOUTH DAILY 90 tablet 2     metoprolol succinate (TOPROL-XL) 50 MG 24 hr tablet Take one tab daily 90 tablet 2     Misc Natural Product Nasal (PONARIS) SOLN Spray in nostril daily Two drops to both nostrils at bedtime       Multiple Vitamins-Minerals (CENTRUM PO) Take  by mouth.       mupirocin (BACTROBAN) 2 % ointment Apply topically daily       sertraline (ZOLOFT) 50 MG tablet Take 1 tablet (50 mg) by mouth daily 90 tablet 3     simvastatin (ZOCOR) 40 MG tablet Take 1 tablet (40 mg) by mouth daily 90 tablet 3     VITAMIN C, CALCIUM ASCORBATE, PO Take  by mouth. daily       FISH OIL 2 tablets in am and 2 tablets in pm       ipratropium - albuterol 0.5 mg/2.5 mg/3 mL (DUONEB) 0.5-2.5 (3) MG/3ML neb solution TAKE 1 VIAL (3 MLS) BY NEBULIZATION EVERY 6 HOURS AS NEEDED FOR SHORTN ESS OF BREATH / DYSPNEA (Patient not taking: Reported on 3/12/2019) 180 mL 4     Past Surgical History:   Procedure Laterality Date     HC HYSTEROSCOPY W ENDOMETRIAL BX/POLYPECTOMY W/WO D&C  10/26/2000    Hysteroscopy, dilation and curettage, and diagnostic laparoscopy      REMOVAL OF OVARIAN CYST(S)      laparascopic ovarian cystectomy.     HYSTEROSCOPY  11/30/2007    Hysteroscopy, D&C and endometrial ablation.     TUBAL LIGATION      1990     Social History     Socioeconomic History     Marital status:      Spouse name: Santosh     Number of children: 3     Years of education: 12     Highest education level: None   Occupational History     Occupation: para     Employer: [employer]     Comment: special ed   Social Needs     Financial resource strain: None     Food insecurity:     Worry: None     Inability: None     Transportation needs:     Medical: None     Non-medical: None   Tobacco Use     Smoking status:  "Never Smoker     Smokeless tobacco: Never Used   Substance and Sexual Activity     Alcohol use: Yes     Drug use: No     Sexual activity: Yes     Partners: Male     Birth control/protection: Surgical     Comment: tubal   Lifestyle     Physical activity:     Days per week: None     Minutes per session: None     Stress: None   Relationships     Social connections:     Talks on phone: None     Gets together: None     Attends [employer] service: None     Active member of club or organization: None     Attends meetings of clubs or organizations: None     Relationship status: None     Intimate partner violence:     Fear of current or ex partner: None     Emotionally abused: None     Physically abused: None     Forced sexual activity: None   Other Topics Concern     Parent/sibling w/ CABG, MI or angioplasty before 65F 55M? Not Asked   Social History Narrative     None     Family History   Problem Relation Age of Onset     Hypertension Mother      Arthritis Mother      Eye Disorder Mother         glaucoma     Lipids Mother      Hypertension Father      Eye Disorder Father         glaucoma     Lipids Father      Respiratory Father         asthma     Breast Cancer Maternal Aunt          in her 40's     Alzheimer Disease Paternal Aunt         dx at 69     Eye Disorder Maternal Grandmother         glaucoma       ROS: A 12 point review of systems was done. Except for what is listed above in the HPI, the systems review is negative .  I note that she had previous ventriculomegaly on echocardiogram in  and had minimal mitral valve regurgitation.  She stated that it is a hereditary family trait.  She told me that the cardiologist did not advise any follow-up unless she becomes symptomatic and currently she has no chest pain or dyspnea or any symptoms regarding this.    Objective: Vital signs: Blood pressure 134/76, pulse 62, temperature 98.6  F (37  C), temperature source Temporal, resp. rate 18, height 1.651 m (5' 5\"), " weight 109.8 kg (242 lb), last menstrual period 02/19/2017, SpO2 99 %, not currently breastfeeding.    Fundi are benign- disc margins are sharp. No papilledema noted.    HEENT:    Sclerae and conjunctiva are normal.   Ear canals and TMs look normal.  Nasal mucosa is pink  - no polyps or masses seen.  sinuses are non tender to palpation.  Throat is unremarkable . Mucous membranes are moist.   Neck is supple, mobile, no adenopathy or masses palpable. The thyroid feels normal.   Normal range of motion noted.  Chest is clear to auscultation.  No wheezes, rales or rhonchi heard.  Cardiac exam is normal with s1, s2, no murmurs or adventitious sounds.Normal rate and rhythm is heard.    Extremities are without edema and she has normal pulses in her extremities.  She normal capillary refill.    Exam of the right    knee shows that the ligaments are intact x 4.   The patella is not ballotteable.   There is no effusion or swelling about the patella or knee.  ROM at the knee is normal with extension   and flexion  . There is  pain over the medial joint line but no instability there and no effusion.  She was having some pain along the right lateral leg but no calf tenderness.          Assessment/Plan:    1.  Benign essential hypertension stable on medication.  I will check a basic metabolic panel today to assess renal function.  I will refill her losartan and metoprolol.    2.  Hyperlipidemia-stable on simvastatin-we will check lipids today.  She is fasting.    3.  Asthma-stable on albuterol inhaler.  She does not use it often.  She does not smoke.    4.  Depression-stable on Zoloft-I will refill the medication.    5.  Internal derangement of right knee.  I suspect possible medial meniscus tear or medial collateral ligament strain.  Will refer to orthopedics to further evaluate this.    6.  She is morbidly obese and needs to lose weight.I have advised more walking.    7. rechekc in 6 months.    RAMO Weiss MD

## 2019-03-12 NOTE — RESULT ENCOUNTER NOTE
Ambar Please inform Vy/ or caretaker  that this result(s) is/are normal except that her triglycerides are slightly high at 208 which will definitely improve if she loses some weight.  Also her fasting blood sugar is slightly high at 112 and this means that she may be prediabetic.  Weight loss will also help lower the risk of developing diabetes -dalton Weiss MD

## 2019-03-13 ASSESSMENT — ASTHMA QUESTIONNAIRES: ACT_TOTALSCORE: 25

## 2019-03-13 ASSESSMENT — ANXIETY QUESTIONNAIRES: GAD7 TOTAL SCORE: 0

## 2019-03-13 NOTE — RESULT ENCOUNTER NOTE
Ambar Please inform Vy/ or caretaker  that this result(s) is/are essentially normal.  A small amount of swelling was seen and also some degenerative changes consistent with arthritis.  She should keep her orthopedic appointment as planned thanks. RAMO Weiss MD

## 2019-04-05 ENCOUNTER — TELEPHONE (OUTPATIENT)
Dept: FAMILY MEDICINE | Facility: CLINIC | Age: 54
End: 2019-04-05

## 2019-04-05 NOTE — TELEPHONE ENCOUNTER
Panel Management Review      Patient has the following on her problem list:     Asthma review     ACT Total Scores 3/12/2019   ACT TOTAL SCORE -   ASTHMA ER VISITS -   ASTHMA HOSPITALIZATIONS -   ACT TOTAL SCORE (Goal Greater than or Equal to 20) 25   In the past 12 months, how many times did you visit the emergency room for your asthma without being admitted to the hospital? 0   In the past 12 months, how many times were you hospitalized overnight because of your asthma? 0      1. Is Asthma diagnosis on the Problem List? Yes    2. Is Asthma listed on Health Maintenance? Yes    3. Patient is due for:  AAP    Hypertension   Last three blood pressure readings:  BP Readings from Last 3 Encounters:   03/12/19 134/76   10/01/18 138/78   02/19/18 132/82     Blood pressure: Passed    HTN Guidelines:  Age 18-59 BP range:  Less than 140/90  Age 60-85 with Diabetes:  Less than 140/90  Age 60-85 without Diabetes:  less than 150/90      Composite cancer screening  Chart review shows that this patient is due/due soon for the following Pap Smear  Summary:    Patient is due/failing the following:   PAP and PHYSICAL    Action needed:   Patient needs office visit for physical and pap.    Type of outreach:    Sent SeatMe message.    Questions for provider review:    None                                                                                                                                    Darshana York CMA      Chart routed to Care Team.

## 2019-04-10 NOTE — TELEPHONE ENCOUNTER
Patient reviewed RF Code message sent to her, pending her scheduling physical appointment.  Darshana York, CMA

## 2019-04-19 ENCOUNTER — HEALTH MAINTENANCE LETTER (OUTPATIENT)
Age: 54
End: 2019-04-19

## 2019-05-28 DIAGNOSIS — E78.5 HYPERLIPIDEMIA LDL GOAL <100: ICD-10-CM

## 2019-05-29 RX ORDER — SIMVASTATIN 40 MG
40 TABLET ORAL DAILY
Qty: 90 TABLET | Refills: 2 | Status: SHIPPED | OUTPATIENT
Start: 2019-05-29 | End: 2019-12-30

## 2019-05-29 NOTE — TELEPHONE ENCOUNTER
"Requested Prescriptions   Pending Prescriptions Disp Refills     simvastatin (ZOCOR) 40 MG tablet 90 tablet 3     Sig: Take 1 tablet (40 mg) by mouth daily   Last Written Prescription Date:  3/12/2019  Last Fill Quantity: 90,  # refills: 3   Last office visit: 3/12/2019 with prescribing provider:     Future Office Visit:        Statins Protocol Failed - 5/29/2019  8:22 AM        Failed - Recent (12 mo) or future (30 days) visit within the authorizing provider's specialty     Patient had office visit in the last 12 months or has a visit in the next 30 days with authorizing provider or within the authorizing provider's specialty.  See \"Patient Info\" tab in inbasket, or \"Choose Columns\" in Meds & Orders section of the refill encounter.              Passed - LDL on file in past 12 months     Recent Labs   Lab Test 03/12/19  0750   LDL 71             Passed - No abnormal creatine kinase in past 12 months     No lab results found.             Passed - Medication is active on med list        Passed - Patient is age 18 or older        Passed - No active pregnancy on record        Passed - No positive pregnancy test in past 12 months      Current refills sent to pharmacy change requested  Susie Ace RN    "

## 2019-05-29 NOTE — TELEPHONE ENCOUNTER
Patient calling following on on prescription. States she has been trying to get this filled for 2 weeks now and we just received this yesterday. Patient has been out for 2 weeks now. Please advise.     Maribel Contreras ~ Patient Representative  [address] [address]  [email or web address]  [email or web address]  Office:  [phone]  Fax:  [phone]

## 2019-07-05 ENCOUNTER — OFFICE VISIT (OUTPATIENT)
Dept: ORTHOPEDICS | Facility: CLINIC | Age: 54
End: 2019-07-05
Payer: COMMERCIAL

## 2019-07-05 VITALS
SYSTOLIC BLOOD PRESSURE: 130 MMHG | WEIGHT: 240 LBS | DIASTOLIC BLOOD PRESSURE: 82 MMHG | HEIGHT: 65 IN | BODY MASS INDEX: 39.99 KG/M2

## 2019-07-05 DIAGNOSIS — M65.342 TRIGGER FINGER, LEFT RING FINGER: ICD-10-CM

## 2019-07-05 DIAGNOSIS — M65.331 TRIGGER FINGER, RIGHT MIDDLE FINGER: Primary | ICD-10-CM

## 2019-07-05 PROCEDURE — 99213 OFFICE O/P EST LOW 20 MIN: CPT | Performed by: ORTHOPAEDIC SURGERY

## 2019-07-05 ASSESSMENT — PAIN SCALES - GENERAL: PAINLEVEL: MILD PAIN (2)

## 2019-07-05 ASSESSMENT — MIFFLIN-ST. JEOR: SCORE: 1689.51

## 2019-07-05 NOTE — PROGRESS NOTES
ORTHOPEDIC CONSULT      Chief Complaint: Vy Villalobos is a 54 year old female who is being seen for Chief Complaint   Patient presents with     Musculoskeletal Problem     left ring finger-      Consult       History of Present Illness:   Presents with left ring finger triggering in the past but now more stiffness and right middle finger triggering.  October 2018 she underwent flexor sheath steroid injections to both fingers.  They did good up until April of this year.  Her left ring finger which bothers her the most started catching and locking again.  It is now progressed to limited motion.  She drops objects because of pain.  Right middle finger continues to trigger and lock at times.  She is also attempted some heat and ice in the past with no improvement.  Presents with her         Patient's past medical, surgical, social and family histories reviewed.     Past Medical History:   Diagnosis Date     Cardiomegaly 11/7/2007    Ventricular - see echo 10/07     Microalbuminuria 02/20/2018     Mild intermittent asthma     albuterol prn, prednisone as needed.     MITRAL VALVE DISORDER 11/7/2007    Regurg - mild - see echo 10/07       Past Surgical History:   Procedure Laterality Date     HC HYSTEROSCOPY W ENDOMETRIAL BX/POLYPECTOMY W/WO D&C  10/26/2000    Hysteroscopy, dilation and curettage, and diagnostic laparoscopy     HC REMOVAL OF OVARIAN CYST(S)      laparascopic ovarian cystectomy.     HYSTEROSCOPY  11/30/2007    Hysteroscopy, D&C and endometrial ablation.     TUBAL LIGATION      1990       Medications:    Current Outpatient Medications on File Prior to Visit:  Biotin 22743 MCG TABS    CALCIUM-VITAMIN D PO Take  by mouth. daily   FISH OIL 2 tablets in am and 2 tablets in pm   ipratropium - albuterol 0.5 mg/2.5 mg/3 mL (DUONEB) 0.5-2.5 (3) MG/3ML neb solution TAKE 1 VIAL (3 MLS) BY NEBULIZATION EVERY 6 HOURS AS NEEDED FOR SHORTN ESS OF BREATH / DYSPNEA   losartan (COZAAR) 25 MG tablet Take 0.5 tablets  "(12.5 mg) by mouth daily   metoprolol succinate ER (TOPROL-XL) 100 MG 24 hr tablet Take 1 tablet (100 mg) by mouth daily   Misc Natural Product Nasal (PONARIS) SOLN Spray in nostril daily Two drops to both nostrils at bedtime   Multiple Vitamins-Minerals (CENTRUM PO) Take  by mouth.   sertraline (ZOLOFT) 50 MG tablet Take 1 tablet (50 mg) by mouth daily   simvastatin (ZOCOR) 40 MG tablet Take 1 tablet (40 mg) by mouth daily   VITAMIN C, CALCIUM ASCORBATE, PO Take  by mouth. daily   albuterol (PROAIR HFA/PROVENTIL HFA/VENTOLIN HFA) 108 (90 Base) MCG/ACT inhaler Inhale 2 puffs into the lungs every 6 hours as needed for shortness of breath / dyspnea (Patient not taking: Reported on 2019)     No current facility-administered medications on file prior to visit.     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   Tobacco Use     Smoking status: Never Smoker     Smokeless tobacco: Never Used   Substance and Sexual Activity     Alcohol use: Yes     Drug use: No     Sexual activity: Yes     Partners: Male     Birth control/protection: Surgical     Comment: tubal       Family History   Problem Relation Age of Onset     Hypertension Mother      Arthritis Mother      Eye Disorder Mother         glaucoma     Lipids Mother      Hypertension Father      Eye Disorder Father         glaucoma     Lipids Father      Respiratory Father         asthma     Breast Cancer Maternal Aunt          in her 40's     Alzheimer Disease Paternal Aunt         dx at 69     Eye Disorder Maternal Grandmother         glaucoma       REVIEW OF SYSTEMS  10 point review systems performed otherwise negative as noted as per history of present illness.    Physical Exam:  Vitals: /82   Ht 1.651 m (5' 5\")   Wt 108.9 kg (240 lb)   LMP 2017   BMI 39.94 kg/m    BMI= Body mass index is " 39.94 kg/m .  Constitutional: healthy, alert and no acute distress   Psychiatric: mentation appears normal and affect normal/bright  NEURO: no focal deficits  RESP: Normal with easy respirations and no use of accessory muscles to breathe, no audible wheezing or retractions  CV: No peripheral edema         Regular rate and rhythm by palpation  SKIN: No erythema, rashes, excoriation, or breakdown. No evidence of infection.   JOINT/EXTREMITIES:left hand: No focal areas of tenderness.  Left ring finger does have some swelling.  There is no evidence of instability.  The flexor and extensor tendon is intact.  Motion is if with no ability to actively flex to make a full fist.  No overt locking or catching.  Right middle finger is tenderness over the A1 pulley associate with clicking and locking and motion.     GAIT: not tested     Diagnostic Modalities:  bilateral hand X-ray: No fractures or dislocations.  There is some mild degenerative changes at the STT joint on the right hand as well as DIP joints.  Left hand demonstrate some arthritis particularly through the thumb.  And minimal joint space loss of the fourth and fifth MCP joints  Independent visualization of the images was performed.      Impression: left ring finger trigger finger  Right middle trigger finger    Plan:  All of the above pertinent physical exam and imaging modalities findings was reviewed with Vy and her .    Options discussed.  Given her failure of conservative care including the flexor sheath steroid injection, rest, activity modification, ice, heat associated with loss of motion and locking we discussed proceeding with left ring finger trigger finger release.  Plan to proceed in a staged manner with the right middle finger to follow.    Risks, benefits, alternatives, anticipated postoperative course, and limitations was reviewed. Risks including bleeding, infection, scar, scar tenderness, stiffness, nerve damage leading to numb fingers,  damage to artery and tendons, incomplete release, failure to relieve all symptoms was discussed. Anticipated anesthesia, local discussed.    Once all was reviewed, the patient agreed to proceed with surgery.   I discussed her motion stiffness in her left ring finger.  We will follow closely postoperatively.  May need hand therapy.  Return to clinic 10 days post-operatively. , or sooner as needed for changes.  Re-x-ray on return: Linda Hawkins D.O.

## 2019-07-05 NOTE — LETTER
7/5/2019         RE: Vy Villalobos  [address] [facility] [address]        Dear Colleague,    Thank you for referring your patient, Vy Villalobos, to the [facility]. Please see a copy of my visit note below.    ORTHOPEDIC CONSULT      Chief Complaint: Vy Villalobos is a 54 year old female who is being seen for Chief Complaint   Patient presents with     Musculoskeletal Problem     left ring finger-      Consult       History of Present Illness:   Presents with left ring finger triggering in the past but now more stiffness and right middle finger triggering.  October 2018 she underwent flexor sheath steroid injections to both fingers.  They did good up until April of this year.  Her left ring finger which bothers her the most started catching and locking again.  It is now progressed to limited motion.  She drops objects because of pain.  Right middle finger continues to trigger and lock at times.  She is also attempted some heat and ice in the past with no improvement.  Presents with her         Patient's past medical, surgical, social and family histories reviewed.     Past Medical History:   Diagnosis Date     Cardiomegaly 11/7/2007    Ventricular - see echo 10/07     Microalbuminuria 02/20/2018     Mild intermittent asthma     albuterol prn, prednisone as needed.     MITRAL VALVE DISORDER 11/7/2007    Regurg - mild - see echo 10/07       Past Surgical History:   Procedure Laterality Date     HC HYSTEROSCOPY W ENDOMETRIAL BX/POLYPECTOMY W/WO D&C  10/26/2000    Hysteroscopy, dilation and curettage, and diagnostic laparoscopy     HC REMOVAL OF OVARIAN CYST(S)      laparascopic ovarian cystectomy.     HYSTEROSCOPY  11/30/2007    Hysteroscopy, D&C and endometrial ablation.     TUBAL LIGATION      1990       Medications:    Current Outpatient Medications on File Prior to Visit:  Biotin 85509 MCG TABS    CALCIUM-VITAMIN D PO Take  by mouth. daily   FISH OIL 2 tablets in am and 2 tablets in pm    ipratropium - albuterol 0.5 mg/2.5 mg/3 mL (DUONEB) 0.5-2.5 (3) MG/3ML neb solution TAKE 1 VIAL (3 MLS) BY NEBULIZATION EVERY 6 HOURS AS NEEDED FOR SHORTN ESS OF BREATH / DYSPNEA   losartan (COZAAR) 25 MG tablet Take 0.5 tablets (12.5 mg) by mouth daily   metoprolol succinate ER (TOPROL-XL) 100 MG 24 hr tablet Take 1 tablet (100 mg) by mouth daily   Misc Natural Product Nasal (PONARIS) SOLN Spray in nostril daily Two drops to both nostrils at bedtime   Multiple Vitamins-Minerals (CENTRUM PO) Take  by mouth.   sertraline (ZOLOFT) 50 MG tablet Take 1 tablet (50 mg) by mouth daily   simvastatin (ZOCOR) 40 MG tablet Take 1 tablet (40 mg) by mouth daily   VITAMIN C, CALCIUM ASCORBATE, PO Take  by mouth. daily   albuterol (PROAIR HFA/PROVENTIL HFA/VENTOLIN HFA) 108 (90 Base) MCG/ACT inhaler Inhale 2 puffs into the lungs every 6 hours as needed for shortness of breath / dyspnea (Patient not taking: Reported on 2019)     No current facility-administered medications on file prior to visit.     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   Tobacco Use     Smoking status: Never Smoker     Smokeless tobacco: Never Used   Substance and Sexual Activity     Alcohol use: Yes     Drug use: No     Sexual activity: Yes     Partners: Male     Birth control/protection: Surgical     Comment: tubal       Family History   Problem Relation Age of Onset     Hypertension Mother      Arthritis Mother      Eye Disorder Mother         glaucoma     Lipids Mother      Hypertension Father      Eye Disorder Father         glaucoma     Lipids Father      Respiratory Father         asthma     Breast Cancer Maternal Aunt          in her 40's     Alzheimer Disease Paternal Aunt         dx at 69     Eye Disorder Maternal Grandmother         glaucoma       REVIEW OF SYSTEMS  10 point  "review systems performed otherwise negative as noted as per history of present illness.    Physical Exam:  Vitals: /82   Ht 1.651 m (5' 5\")   Wt 108.9 kg (240 lb)   LMP 02/19/2017   BMI 39.94 kg/m     BMI= Body mass index is 39.94 kg/m .  Constitutional: healthy, alert and no acute distress   Psychiatric: mentation appears normal and affect normal/bright  NEURO: no focal deficits  RESP: Normal with easy respirations and no use of accessory muscles to breathe, no audible wheezing or retractions  CV: No peripheral edema         Regular rate and rhythm by palpation  SKIN: No erythema, rashes, excoriation, or breakdown. No evidence of infection.   JOINT/EXTREMITIES:left hand: No focal areas of tenderness.  Left ring finger does have some swelling.  There is no evidence of instability.  The flexor and extensor tendon is intact.  Motion is if with no ability to actively flex to make a full fist.  No overt locking or catching.  Right middle finger is tenderness over the A1 pulley associate with clicking and locking and motion.     GAIT: not tested     Diagnostic Modalities:  bilateral hand X-ray: No fractures or dislocations.  There is some mild degenerative changes at the STT joint on the right hand as well as DIP joints.  Left hand demonstrate some arthritis particularly through the thumb.  And minimal joint space loss of the fourth and fifth MCP joints  Independent visualization of the images was performed.      Impression: left ring finger trigger finger  Right middle trigger finger    Plan:  All of the above pertinent physical exam and imaging modalities findings was reviewed with Vy and her .    Options discussed.  Given her failure of conservative care including the flexor sheath steroid injection, rest, activity modification, ice, heat associated with loss of motion and locking we discussed proceeding with left ring finger trigger finger release.  Plan to proceed in a staged manner with the right " middle finger to follow.    Risks, benefits, alternatives, anticipated postoperative course, and limitations was reviewed. Risks including bleeding, infection, scar, scar tenderness, stiffness, nerve damage leading to numb fingers, damage to artery and tendons, incomplete release, failure to relieve all symptoms was discussed. Anticipated anesthesia, local discussed.    Once all was reviewed, the patient agreed to proceed with surgery.   I discussed her motion stiffness in her left ring finger.  We will follow closely postoperatively.  May need hand therapy.  Return to clinic 10 days post-operatively. , or sooner as needed for changes.  Re-x-ray on return: No    Lauri Hawkins D.O.    Again, thank you for allowing me to participate in the care of your patient.        Sincerely,        Rodger Hawkins, DO

## 2019-07-08 ENCOUNTER — TELEPHONE (OUTPATIENT)
Dept: ORTHOPEDICS | Facility: OTHER | Age: 54
End: 2019-07-08

## 2019-07-08 NOTE — TELEPHONE ENCOUNTER
Type of surgery: left ring trigger finger release  Location of surgery: [facility]   Date of surgery: 7/26/19  Surgeon: Dr. Hawkins  Pre-Op Appt Date: na  Post-Op Appt Date: 8/7   Packet sent out: Surgery packet was given to patient in clinic.   Pre-cert/Authorization completed: NA  Date: 7/8/2019    Kandace Sebastian  Surgery Scheduler

## 2019-07-26 ENCOUNTER — HOSPITAL ENCOUNTER (OUTPATIENT)
Facility: CLINIC | Age: 54
Discharge: HOME | End: 2019-07-26
Attending: ORTHOPAEDIC SURGERY | Admitting: ORTHOPAEDIC SURGERY
Payer: COMMERCIAL

## 2019-07-26 VITALS
RESPIRATION RATE: 16 BRPM | TEMPERATURE: 98 F | DIASTOLIC BLOOD PRESSURE: 90 MMHG | OXYGEN SATURATION: 98 % | SYSTOLIC BLOOD PRESSURE: 162 MMHG

## 2019-07-26 DIAGNOSIS — M65.342 TRIGGER RING FINGER OF LEFT HAND: Primary | ICD-10-CM

## 2019-07-26 PROCEDURE — 26055 INCISE FINGER TENDON SHEATH: CPT | Mod: F3 | Performed by: ORTHOPAEDIC SURGERY

## 2019-07-26 PROCEDURE — 36000050 ZZH SURGERY LEVEL 2 1ST 30 MIN: Performed by: ORTHOPAEDIC SURGERY

## 2019-07-26 PROCEDURE — 27210794 ZZH OR GENERAL SUPPLY STERILE: Performed by: ORTHOPAEDIC SURGERY

## 2019-07-26 PROCEDURE — 71000027 ZZH RECOVERY PHASE 2 EACH 15 MINS: Performed by: ORTHOPAEDIC SURGERY

## 2019-07-26 PROCEDURE — 25000128 H RX IP 250 OP 636: Performed by: ORTHOPAEDIC SURGERY

## 2019-07-26 PROCEDURE — 25000125 ZZHC RX 250: Performed by: ORTHOPAEDIC SURGERY

## 2019-07-26 PROCEDURE — 40000306 ZZH STATISTIC PRE PROC ASSESS II: Performed by: ORTHOPAEDIC SURGERY

## 2019-07-26 RX ORDER — HYDROCODONE BITARTRATE AND ACETAMINOPHEN 5; 325 MG/1; MG/1
1-2 TABLET ORAL EVERY 6 HOURS PRN
Qty: 6 TABLET | Refills: 0 | Status: SHIPPED | OUTPATIENT
Start: 2019-07-26 | End: 2019-08-28

## 2019-07-26 RX ORDER — LIDOCAINE HYDROCHLORIDE 10 MG/ML
INJECTION, SOLUTION INFILTRATION; PERINEURAL PRN
Status: DISCONTINUED | OUTPATIENT
Start: 2019-07-26 | End: 2019-07-26 | Stop reason: HOSPADM

## 2019-07-26 RX ORDER — BUPIVACAINE HYDROCHLORIDE 5 MG/ML
INJECTION, SOLUTION PERINEURAL PRN
Status: DISCONTINUED | OUTPATIENT
Start: 2019-07-26 | End: 2019-07-26 | Stop reason: HOSPADM

## 2019-07-26 NOTE — BRIEF OP NOTE
[facility] Brief Operative Note    Pre-operative diagnosis: left ring finger trigger finger   Post-operative diagnosis: Same   Procedure: Procedure(s):  left ring trigger finger release   Surgeon:  Anesthesia: Rodger Hawkins DO  Local only   Assistant(s): Bishop Green PA-C   Estimated blood loss:  Fluids: Minimal  0 Crystalloids   Specimens: None   Findings: See dictated operative report for full details [id number]     Lauri Hawkins D.O.

## 2019-07-26 NOTE — DISCHARGE INSTRUCTIONS
Trigger Finger Release Discharge Instructions                                    [phone]  Bone and Joint Service Line for issues or concerns      General Care:  After surgery you may feel tired/sleepy. This is normal. If you have any question along the way please contact the office. If you feel it is an issue cannot wait for normal office hours, contact the on-call physician.    Bandages:   You will have a bulky dressing on after surgery. Keep it clean and dry. Remove it in 3 days after surgery. Keep a band aid on your incision site.     Bathing:  Do not submerge your incision in water such as a bath or pool. Clean your incision with warm soapy water and gently tap with a dry clean cloth to dry the area.     Follow up:  Your follow up appointment should already be scheduled. If its not, please call the office to verify an appointment 7 days after surgery. Sutures will be removed at this visit.     Diet:  Start with non-alcoholic liquids at first, particularly water or sports drinks after surgery. Progress to bland foods such as crackers and bread and finally to your normal diet if you have no problems.     Pain control:  Take your pain medications as prescribed. These medications may make you sleepy. Do not drive, operate equipment, or drink alcohol when taking these.  You may take Tylenol (Generic name is acetaminophen) as directed on the bottle in place of the prescribed pain medications as your pain gets better. If the medications cause a reaction such as nausea or skin rash, stop taking them and contact your doctor. Please plan accordingly, pain medications will not be re-filled on the weekends or at night. Call the office during the day if you need more medications.      Physical Therapy/Occupational Therapy:  At your first post-operative visit, your doctor will direct you on your personal therapy program if needed.     Activity:  Keep your hand elevated for the first couple days after surgery. Avoid  lifting, pushing, and or pulling with the operated hand.  After removing the dressing work on your range of motion. Start by making your finger flat against a table and then gently work on make a fist. Do this a few times a day. As the swelling improves so will your motion.    Normal findings after surgery:  Numbness and tenderness around the incisions is normal.  You may have bruising around the incisions.  Low grade fevers less than 100.5 degrees Fahrenheit are normal.   Your finger will feel stiff/tight.    When to call the Office:  Temperature greater than 101.5 degrees Fahreheit.  Fever, chills, and increasing pain in the finger.  Excessive drainage from the incisions that include bright red blood.  Drainage from the incisions sites that appear yellow, pus-like, or foul smelling.  Persistent nausea or vomiting.  Any other effects you feel are significant.

## 2019-07-26 NOTE — OP NOTE
Procedure Date: 07/26/2019      PREOPERATIVE DIAGNOSIS: Left ring finger trigger finger.      POSTOPERATIVE DIAGNOSIS:  Left ring finger trigger finger.      PROCEDURE PERFORMED:  Left ring trigger finger release at the A1 pulley.      SURGEON:  Rodger Hawkins DO      ASSISTANT:  Bishop rGeen.      ANESTHESIA:  Local only.      COMPLICATIONS:  None.      ESTIMATED BLOOD LOSS:  Minimal.      COUNTS:  Correct.      DISPOSITION:  To PACU in stable condition.      GROSS FINDINGS:  She had a preoperative locking and catching of the left ring finger with range of motion.  Upon release of A1 pulley, there was no further locking or catching.  There were no masses or lesions.  The tendons were intact.      INDICATIONS:  This is a pleasant 54-year-old female who underwent a previous flexor sheath steroid injection, rest, activity modification, ice and heat to her hand.  However, she continued to have a left ring finger catching and locking.  We discussed her options.  Given her failure of conservative care with continued symptoms, we discussed trigger finger release.  Postoperative timeframe  for recovery reviewed.  Risks and benefits were reviewed.  Once this was discussed, she opted to proceed.      DETAILS OF PROCEDURE:  She was met preoperatively and informed consent was verified, appropriate extremity was marked, and she was wheeled to operative suite #1.  Transferred to the OR table without any issues.  When deemed appropriate under aseptic conditions after a timeout was performed, the area along the left ring finger base was anesthetized with a mixture of 0.5% Marcaine and 1% lidocaine.  The hand was then sterilely prepped and draped in normal manner.  Prior to incision, adequate anesthesia was assured.  A secondary timeout was performed.  An Esmarch was utilized to exsanguinate the extremity.  Oblique incision was made along the base of the left ring finger.  Blunt dissection down to the flexor sheath.  With a separate #15  blade, the A1 pulley was released.  The tendons were inspected showing no masses, lesions or tears.  She was able to take the ring finger through full range of motion with no further locking or catching.  The tourniquet was deflated.  We repeated the process.  She was able to take the finger through full range of motion with no further locking and catching.  Hemostasis was achieved with the approach.  The skin edges were approximated with nylon.  Sterile bandage was applied.  She subsequently transferred to PACU in stable condition.         MANDEEP QUEVEDO DO             D: 2019   T: 2019   MT: [place]      Name:     SANDRA BRAGA   MRN:      [id number]        Account:        [id number]   :      1965           Procedure Date: 2019      Document: [id number]

## 2019-07-27 NOTE — OR NURSING
[facility] Same Day Surgery  Discharge Call Back  Vy Villalobos  1965  MRN: [id number]  Home: [phone] (home) [phone] (work)  PCP: Nava Michelle    We are calling to see how you're doing since your surgery/procedure with us?   Comments: N/A  Clinical Questions  1. Have you had time to look at your discharge instructions? Do you have any questions in regards to the instructions?   Comment: Yes/No  2. Do you feel your pain is being controlled with the regimen the surgeon sent you home on? (ie: prescription medications, over the counter pain medications, ice packs)   Comments: Yes  3. Have you noticed any drainage on your dressing? Do you know what to do if you have bleeding as a result of your procedure?   Comments: No/Yes  4. Have you had any nausea/vomiting? Do you know how to treat this?   Comment: Local Anesthesia  5. Have you had any signs/symptoms of infection? (ie: fever, swelling, heat, drainage or redness) Do you know what to do if you have?   Comment: No/Yes  6. Do you have a follow up appointment made with your surgeon? Do you have a number to contact them at if you need it?   Comment: Yes/Yes  Retained Foreign Object:  N/A      You may be randomly selected to fill out a [facility] survey. We would appreciate you taking the time to fill this out. It is important to us if you would answer all of the questions on the survey.

## 2019-08-07 ENCOUNTER — OFFICE VISIT (OUTPATIENT)
Dept: ORTHOPEDICS | Facility: CLINIC | Age: 54
End: 2019-08-07
Payer: COMMERCIAL

## 2019-08-07 VITALS
HEIGHT: 65 IN | BODY MASS INDEX: 40.32 KG/M2 | DIASTOLIC BLOOD PRESSURE: 80 MMHG | WEIGHT: 242 LBS | SYSTOLIC BLOOD PRESSURE: 127 MMHG

## 2019-08-07 DIAGNOSIS — M65.342 TRIGGER FINGER, LEFT RING FINGER: Primary | ICD-10-CM

## 2019-08-07 PROCEDURE — 99024 POSTOP FOLLOW-UP VISIT: CPT | Performed by: ORTHOPAEDIC SURGERY

## 2019-08-07 ASSESSMENT — MIFFLIN-ST. JEOR: SCORE: 1698.58

## 2019-08-07 NOTE — PROGRESS NOTES
[facility] Clinic Post-Operative Note    CHIEF COMPLAINT:   Chief Complaint   Patient presents with     Surgical Followup     DOS: 7/26/2019~Left ring trigger finger release at the A1 pulley~12 days postop       HISTORY OF PRESENT ILLNESS  No locking.  No catching.  No numbness and tingling.    Patient's past medical, surgical, social and family histories reviewed.     Past Medical History:   Diagnosis Date     Cardiomegaly 11/7/2007    Ventricular - see echo 10/07     Microalbuminuria 02/20/2018     Mild intermittent asthma     albuterol prn, prednisone as needed.     MITRAL VALVE DISORDER 11/7/2007    Regurg - mild - see echo 10/07       Past Surgical History:   Procedure Laterality Date     HC HYSTEROSCOPY W ENDOMETRIAL BX/POLYPECTOMY W/WO D&C  10/26/2000    Hysteroscopy, dilation and curettage, and diagnostic laparoscopy     HC REMOVAL OF OVARIAN CYST(S)      laparascopic ovarian cystectomy.     HYSTEROSCOPY  11/30/2007    Hysteroscopy, D&C and endometrial ablation.     RELEASE TRIGGER FINGER Left 7/26/2019    Procedure: left ring trigger finger release;  Surgeon: Rodger Hawkins DO;  Location: [facility] OR     TUBAL LIGATION      1990       Medications:    Current Outpatient Medications on File Prior to Visit:  albuterol (PROAIR HFA/PROVENTIL HFA/VENTOLIN HFA) 108 (90 Base) MCG/ACT inhaler Inhale 2 puffs into the lungs every 6 hours as needed for shortness of breath / dyspnea (Patient not taking: Reported on 7/5/2019)   Biotin 72809 MCG TABS    CALCIUM-VITAMIN D PO Take  by mouth. daily   FISH OIL 2 tablets in am and 2 tablets in pm   HYDROcodone-acetaminophen (NORCO) 5-325 MG tablet Take 1-2 tablets by mouth every 6 hours as needed for moderate to severe pain (Patient not taking: Reported on 8/7/2019)   ipratropium - albuterol 0.5 mg/2.5 mg/3 mL (DUONEB) 0.5-2.5 (3) MG/3ML neb solution TAKE 1 VIAL (3 MLS) BY NEBULIZATION EVERY 6 HOURS AS NEEDED FOR SHORTN ESS OF BREATH / DYSPNEA   losartan (COZAAR) 25 MG  "tablet Take 0.5 tablets (12.5 mg) by mouth daily   metoprolol succinate ER (TOPROL-XL) 100 MG 24 hr tablet Take 1 tablet (100 mg) by mouth daily   Misc Natural Product Nasal (PONARIS) SOLN Spray in nostril daily Two drops to both nostrils at bedtime   Multiple Vitamins-Minerals (CENTRUM PO) Take  by mouth.   sertraline (ZOLOFT) 50 MG tablet Take 1 tablet (50 mg) by mouth daily   simvastatin (ZOCOR) 40 MG tablet Take 1 tablet (40 mg) by mouth daily   VITAMIN C, CALCIUM ASCORBATE, PO Take  by mouth. daily     No current facility-administered medications on file prior to visit.     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   Tobacco Use     Smoking status: Never Smoker     Smokeless tobacco: Never Used   Substance and Sexual Activity     Alcohol use: Yes     Drug use: No     Sexual activity: Yes     Partners: Male     Birth control/protection: Surgical     Comment: tubal       Family History   Problem Relation Age of Onset     Hypertension Mother      Arthritis Mother      Eye Disorder Mother         glaucoma     Lipids Mother      Hypertension Father      Eye Disorder Father         glaucoma     Lipids Father      Respiratory Father         asthma     Breast Cancer Maternal Aunt          in her 40's     Alzheimer Disease Paternal Aunt         dx at 69     Eye Disorder Maternal Grandmother         glaucoma       REVIEW OF SYSTEMS  General: negative for, night sweats, dizziness, fatigue  Resp: No shortness of breath and no cough  CV: negative for chest pain, syncope or near-syncope  GI: negative for nausea, vomiting and diarrhea  : negative for dysuria and hematuria  Musculoskeletal: as above  Neurologic: negative for syncope   Hematologic: negative for bleeding disorder    Physical Exam:  Vitals: /80   Ht 1.651 m (5' 5\")   Wt 109.8 kg (242 lb)   " LMP 02/19/2017   BMI 40.27 kg/m    BMI= Body mass index is 40.27 kg/m .  Constitutional: healthy, alert and no acute distress   Psychiatric: mentation appears normal and affect normal/bright  NEURO: no focal deficits  SKIN: .healing well, well approximated skin edges, without signs of infection including no erythema, incision breakdown or purlent drainage  JOINT/EXTREMITIES: Some terminal flexion stiffness.  Distal neurovascular intact.  Fingers well-perfused  GAIT: not tested     Diagnostic Modalities:  None today.  Independent visualization of the images was performed.      Impression:   Chief Complaint   Patient presents with     Surgical Followup     DOS: 7/26/2019~Left ring trigger finger release at the A1 pulley~12 days postop   Doing well.    Plan:   Activity: Keep incision clean and dry, Gradual return back to full activity  Staples/Sutures out: Yes.  Pain controlled: Yes. Continue to use: Nothing  Immobilzation: No  Physical Therapy: none at this time.   Rest  Return to clinic 3, week(s), PRN, or sooner as needed for changes.    Re-x-ray on return: No    Lauri Hawkins D.O.

## 2019-08-07 NOTE — LETTER
8/7/2019         RE: Vy Villalobos  [address] [facility] [address]        Dear Colleague,    Thank you for referring your patient, Vy Villalobos, to the [facility]. Please see a copy of my visit note below.    Suture removed per verbal Dr. Shruthi Correia/[facility]       Orthopedic Clinic Post-Operative Note    CHIEF COMPLAINT:   Chief Complaint   Patient presents with     Surgical Followup     DOS: 7/26/2019~Left ring trigger finger release at the A1 pulley~12 days postop       HISTORY OF PRESENT ILLNESS  No locking.  No catching.  No numbness and tingling.    Patient's past medical, surgical, social and family histories reviewed.     Past Medical History:   Diagnosis Date     Cardiomegaly 11/7/2007    Ventricular - see echo 10/07     Microalbuminuria 02/20/2018     Mild intermittent asthma     albuterol prn, prednisone as needed.     MITRAL VALVE DISORDER 11/7/2007    Regurg - mild - see echo 10/07       Past Surgical History:   Procedure Laterality Date     [facility] HYSTEROSCOPY W ENDOMETRIAL BX/POLYPECTOMY W/WO D&C  10/26/2000    Hysteroscopy, dilation and curettage, and diagnostic laparoscopy     HC REMOVAL OF OVARIAN CYST(S)      laparascopic ovarian cystectomy.     HYSTEROSCOPY  11/30/2007    Hysteroscopy, D&C and endometrial ablation.     RELEASE TRIGGER FINGER Left 7/26/2019    Procedure: left ring trigger finger release;  Surgeon: Rodger Hawkins DO;  Location: [facility] OR     TUBAL LIGATION      1990       Medications:    Current Outpatient Medications on File Prior to Visit:  albuterol (PROAIR HFA/PROVENTIL HFA/VENTOLIN HFA) 108 (90 Base) MCG/ACT inhaler Inhale 2 puffs into the lungs every 6 hours as needed for shortness of breath / dyspnea (Patient not taking: Reported on 7/5/2019)   Biotin 99383 MCG TABS    CALCIUM-VITAMIN D PO Take  by mouth. daily   FISH OIL 2 tablets in am and 2 tablets in pm   HYDROcodone-acetaminophen (NORCO) 5-325 MG tablet Take 1-2 tablets by mouth every 6 hours  as needed for moderate to severe pain (Patient not taking: Reported on 2019)   ipratropium - albuterol 0.5 mg/2.5 mg/3 mL (DUONEB) 0.5-2.5 (3) MG/3ML neb solution TAKE 1 VIAL (3 MLS) BY NEBULIZATION EVERY 6 HOURS AS NEEDED FOR SHORTN ESS OF BREATH / DYSPNEA   losartan (COZAAR) 25 MG tablet Take 0.5 tablets (12.5 mg) by mouth daily   metoprolol succinate ER (TOPROL-XL) 100 MG 24 hr tablet Take 1 tablet (100 mg) by mouth daily   Misc Natural Product Nasal (PONARIS) SOLN Spray in nostril daily Two drops to both nostrils at bedtime   Multiple Vitamins-Minerals (CENTRUM PO) Take  by mouth.   sertraline (ZOLOFT) 50 MG tablet Take 1 tablet (50 mg) by mouth daily   simvastatin (ZOCOR) 40 MG tablet Take 1 tablet (40 mg) by mouth daily   VITAMIN C, CALCIUM ASCORBATE, PO Take  by mouth. daily     No current facility-administered medications on file prior to visit.     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   Tobacco Use     Smoking status: Never Smoker     Smokeless tobacco: Never Used   Substance and Sexual Activity     Alcohol use: Yes     Drug use: No     Sexual activity: Yes     Partners: Male     Birth control/protection: Surgical     Comment: tubal       Family History   Problem Relation Age of Onset     Hypertension Mother      Arthritis Mother      Eye Disorder Mother         glaucoma     Lipids Mother      Hypertension Father      Eye Disorder Father         glaucoma     Lipids Father      Respiratory Father         asthma     Breast Cancer Maternal Aunt          in her 40's     Alzheimer Disease Paternal Aunt         dx at 69     Eye Disorder Maternal Grandmother         glaucoma       REVIEW OF SYSTEMS  General: negative for, night sweats, dizziness, fatigue  Resp: No shortness of breath and no cough  CV: negative for chest pain, syncope or  "near-syncope  GI: negative for nausea, vomiting and diarrhea  : negative for dysuria and hematuria  Musculoskeletal: as above  Neurologic: negative for syncope   Hematologic: negative for bleeding disorder    Physical Exam:  Vitals: /80   Ht 1.651 m (5' 5\")   Wt 109.8 kg (242 lb)   LMP 02/19/2017   BMI 40.27 kg/m     BMI= Body mass index is 40.27 kg/m .  Constitutional: healthy, alert and no acute distress   Psychiatric: mentation appears normal and affect normal/bright  NEURO: no focal deficits  SKIN: .healing well, well approximated skin edges, without signs of infection including no erythema, incision breakdown or purlent drainage  JOINT/EXTREMITIES: Some terminal flexion stiffness.  Distal neurovascular intact.  Fingers well-perfused  GAIT: not tested     Diagnostic Modalities:  None today.  Independent visualization of the images was performed.      Impression:   Chief Complaint   Patient presents with     Surgical Followup     DOS: 7/26/2019~Left ring trigger finger release at the A1 pulley~12 days postop   Doing well.    Plan:   Activity: Keep incision clean and dry, Gradual return back to full activity  Staples/Sutures out: Yes.  Pain controlled: Yes. Continue to use: Nothing  Immobilzation: No  Physical Therapy: none at this time.   Rest  Return to clinic 3, week(s), PRN, or sooner as needed for changes.    Re-x-ray on return: No    Lauri Hawkins D.O.    Again, thank you for allowing me to participate in the care of your patient.        Sincerely,        Rodger Hawkins, DO    "

## 2019-08-28 ENCOUNTER — OFFICE VISIT (OUTPATIENT)
Dept: ORTHOPEDICS | Facility: CLINIC | Age: 54
End: 2019-08-28
Payer: COMMERCIAL

## 2019-08-28 VITALS
DIASTOLIC BLOOD PRESSURE: 83 MMHG | WEIGHT: 242 LBS | SYSTOLIC BLOOD PRESSURE: 141 MMHG | HEIGHT: 65 IN | BODY MASS INDEX: 40.32 KG/M2

## 2019-08-28 DIAGNOSIS — M65.342 TRIGGER FINGER, LEFT RING FINGER: ICD-10-CM

## 2019-08-28 DIAGNOSIS — Z98.890 S/P TRIGGER FINGER RELEASE: Primary | ICD-10-CM

## 2019-08-28 PROCEDURE — 99024 POSTOP FOLLOW-UP VISIT: CPT | Performed by: ORTHOPAEDIC SURGERY

## 2019-08-28 ASSESSMENT — MIFFLIN-ST. JEOR: SCORE: 1698.58

## 2019-08-28 NOTE — LETTER
"    8/28/2019         RE: Vy [address] [facility]  [place] [address]        Dear Colleague,    Thank you for referring your patient, Vy Villalobos, to the [facility]. Please see a copy of my visit note below.    HISTORY OF PRESENT ILLNESS:    Vy Villalobos is a 54 year old female who is seen in follow up for   Chief Complaint   Patient presents with     RECHECK     left ring finger follow up     Surgical Followup     DOS: 7/26/2019~Left ring trigger finger release at the A1 pulley~5 weeks postop   Interval: Patient reports some swelling yet of the left ring finger.  She states this causes discomfort at the distal tip of the finger.  Patient also states some pain at the surgical incision site itself.  But this has not affected her ability to work.  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  Patient has found ibuprofen helpful once in a while but ice has not been helpful.  Patient evaluation done with Dr. Hawkins    Physical Exam:  Vitals: BP (!) 141/83   Ht 1.651 m (5' 5\")   Wt 109.8 kg (242 lb)   LMP 02/19/2017   BMI 40.27 kg/m     BMI= Body mass index is 40.27 kg/m .  Constitutional: healthy, alert and no acute distress   Psychiatric: mentation appears normal and affect normal/bright  NEURO: no focal deficits  Location of surgery: Left ring finger volar base  Incision sites: Incision site is mildly reddened.  There is no significant inflammation.  There is no cellulitis.  There is some swelling of the ring finger although patient does have full range of motion with a sensation of tightness at the distal tip.  With palpation, scar tissue can be appreciated.     ASSESSMENT:    Chief Complaint   Patient presents with     RECHECK     left ring finger follow up     Surgical Followup     DOS: 7/26/2019~Left ring trigger finger release at the A1 pulley~5 weeks postop       ICD-10-CM    1. S/P trigger finger release Z98.890    2. Trigger finger, left ring finger M65.342  "     Patient is 5 weeks status post left ring finger trigger release.  Patient is still struggling with some postoperative swelling but maintains full range of motion.  She does have scar tissue developing at the incision site.    Plan:   Patient is advised to massage the incision area with some vegetable oil or vitamin E oil for attempts to break down scar tissue.  Patient is advised that swelling into the digit but can occur for several months postoperatively.  She should maintain her range of motion.  Patient can continue utilizing ibuprofen as needed for pain control  If patient continues to struggle, she will call clinic for occupational therapy orders  Return to clinic as needed    BP Readings from Last 1 Encounters:   08/28/19 (!) 141/83     BP noted to be elevated today in office.  Patient to follow up with Primary Care provider regarding elevated blood pressure.    Chelsie Lawton PA-C   8/28/2019  8:11 AM      I attest I have seen and evaluated the patient.  I agree with above impression and plan.    Rodger Hawkins DO    Again, thank you for allowing me to participate in the care of your patient.        Sincerely,        Rodger Hawknis, DO

## 2019-08-28 NOTE — PROGRESS NOTES
"HISTORY OF PRESENT ILLNESS:    Vy Villalobos is a 54 year old female who is seen in follow up for   Chief Complaint   Patient presents with     RECHECK     left ring finger follow up     Surgical Followup     DOS: 7/26/2019~Left ring trigger finger release at the A1 pulley~5 weeks postop   Interval: Patient reports some swelling yet of the left ring finger.  She states this causes discomfort at the distal tip of the finger.  Patient also states some pain at the surgical incision site itself.  But this has not affected her ability to work.  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  Patient has found ibuprofen helpful once in a while but ice has not been helpful.  Patient evaluation done with Dr. Hawkins    Physical Exam:  Vitals: BP (!) 141/83   Ht 1.651 m (5' 5\")   Wt 109.8 kg (242 lb)   LMP 02/19/2017   BMI 40.27 kg/m    BMI= Body mass index is 40.27 kg/m .  Constitutional: healthy, alert and no acute distress   Psychiatric: mentation appears normal and affect normal/bright  NEURO: no focal deficits  Location of surgery: Left ring finger volar base  Incision sites: Incision site is mildly reddened.  There is no significant inflammation.  There is no cellulitis.  There is some swelling of the ring finger although patient does have full range of motion with a sensation of tightness at the distal tip.  With palpation, scar tissue can be appreciated.     ASSESSMENT:    Chief Complaint   Patient presents with     RECHECK     left ring finger follow up     Surgical Followup     DOS: 7/26/2019~Left ring trigger finger release at the A1 pulley~5 weeks postop       ICD-10-CM    1. S/P trigger finger release Z98.890    2. Trigger finger, left ring finger M65.342      Patient is 5 weeks status post left ring finger trigger release.  Patient is still struggling with some postoperative swelling but maintains full range of motion.  She does have scar tissue developing at the incision site.    Plan: "   Patient is advised to massage the incision area with some vegetable oil or vitamin E oil for attempts to break down scar tissue.  Patient is advised that swelling into the digit but can occur for several months postoperatively.  She should maintain her range of motion.  Patient can continue utilizing ibuprofen as needed for pain control  If patient continues to struggle, she will call clinic for occupational therapy orders  Return to clinic as needed    BP Readings from Last 1 Encounters:   08/28/19 (!) 141/83     BP noted to be elevated today in office.  Patient to follow up with Primary Care provider regarding elevated blood pressure.    Chelsie Lawton PA-C   8/28/2019  8:11 AM      I attest I have seen and evaluated the patient.  I agree with above impression and plan.    Rodger Hawkins DO

## 2019-09-28 ENCOUNTER — HEALTH MAINTENANCE LETTER (OUTPATIENT)
Age: 54
End: 2019-09-28

## 2019-09-30 ENCOUNTER — OFFICE VISIT (OUTPATIENT)
Dept: FAMILY MEDICINE | Facility: OTHER | Age: 54
End: 2019-09-30
Payer: COMMERCIAL

## 2019-09-30 VITALS
RESPIRATION RATE: 20 BRPM | BODY MASS INDEX: 38.56 KG/M2 | SYSTOLIC BLOOD PRESSURE: 134 MMHG | DIASTOLIC BLOOD PRESSURE: 80 MMHG | WEIGHT: 231.7 LBS | TEMPERATURE: 97 F | HEART RATE: 80 BPM | OXYGEN SATURATION: 96 %

## 2019-09-30 DIAGNOSIS — E66.01 SEVERE OBESITY WITH BODY MASS INDEX (BMI) OF 35.0 TO 39.9 WITH SERIOUS COMORBIDITY (H): ICD-10-CM

## 2019-09-30 DIAGNOSIS — F41.1 GAD (GENERALIZED ANXIETY DISORDER): ICD-10-CM

## 2019-09-30 DIAGNOSIS — J45.40 MODERATE PERSISTENT ASTHMA WITHOUT COMPLICATION: Primary | ICD-10-CM

## 2019-09-30 PROCEDURE — 99214 OFFICE O/P EST MOD 30 MIN: CPT | Performed by: PHYSICIAN ASSISTANT

## 2019-09-30 RX ORDER — MUPIROCIN 20 MG/G
OINTMENT TOPICAL 3 TIMES DAILY
COMMUNITY
End: 2022-01-27

## 2019-09-30 RX ORDER — LORAZEPAM 0.5 MG/1
0.5 TABLET ORAL
COMMUNITY
End: 2019-12-30

## 2019-09-30 RX ORDER — FLUTICASONE PROPIONATE 110 UG/1
1 AEROSOL, METERED RESPIRATORY (INHALATION) 2 TIMES DAILY
Qty: 1 INHALER | Refills: 3 | Status: SHIPPED | OUTPATIENT
Start: 2019-09-30 | End: 2019-12-30

## 2019-09-30 RX ORDER — PREDNISONE 20 MG/1
TABLET ORAL
Qty: 11 TABLET | Refills: 0 | Status: SHIPPED | OUTPATIENT
Start: 2019-09-30 | End: 2019-12-30

## 2019-09-30 ASSESSMENT — PAIN SCALES - GENERAL: PAINLEVEL: MILD PAIN (3)

## 2019-09-30 ASSESSMENT — PATIENT HEALTH QUESTIONNAIRE - PHQ9: SUM OF ALL RESPONSES TO PHQ QUESTIONS 1-9: 5

## 2019-09-30 NOTE — NURSING NOTE
Health Maintenance Due   Topic Date Due     HEPATITIS C SCREENING  1965     HIV SCREENING  06/21/1980     HPV  06/21/1986     ZOSTER IMMUNIZATION (1 of 2) 06/21/2015     DTAP/TDAP/TD IMMUNIZATION (3 - Td) 02/18/2019     PREVENTIVE CARE VISIT  02/19/2019     ASTHMA ACTION PLAN  02/19/2019     FIT  02/20/2019     INFLUENZA VACCINE (1) 09/01/2019     ASTHMA CONTROL TEST  09/12/2019     PHQ-9  09/12/2019     Lorie VELAZCO LPN\

## 2019-09-30 NOTE — PROGRESS NOTES
Subjective     Vy Villalobos is a 54 year old female who presents to clinic today for the following health issues:    HPI   Concern - breathing problems, asthma flare up  Onset: 1 week    Description:   SOB, coughing, chest tightness, pain from coughing    Intensity: moderate    Progression of Symptoms:  same    Accompanying Signs & Symptoms:  nothing    Previous history of similar problem:   YES    Precipitating factors:   Worsened by: stress, cold air, pollen, mold    Alleviating factors:  Improved by: fluids, hot tea, cough drops    Therapies Tried and outcome: fluids, hot tea, cough drops, slight relief      Patient is a 54 year old female who presents today with complaint of feeling SOB and cough. She said that her symptoms started 1 week ago and typically do this around the fall/winter. She has a history of moderate persistent asthma which has been managed with albuterol and duoneb. Patient is a former patient of [facility] and has not established with a new provider yet. Denies chest pain, heart palpitations, trouble breathing, abdominal upset, dizziness, or recent illness/fever.       Reviewed and updated as needed this visit by Provider         Review of Systems   ROS COMP: Constitutional, HEENT, cardiovascular, pulmonary, gi and gu systems are negative, except as otherwise noted.      Objective    /80 (BP Location: Right arm, Patient Position: Chair, Cuff Size: Adult Large)   Pulse 80   Temp 97  F (36.1  C) (Oral)   Resp 20   Wt 105.1 kg (231 lb 11.2 oz)   LMP 02/19/2017   SpO2 96%   BMI 38.56 kg/m    Body mass index is 38.56 kg/m .  Physical Exam   GENERAL: healthy, alert and mild distress  RESP: lungs expiratory wheezes throughout, no rales or crackles heard   CV: regular rate and rhythm, normal S1 S2, no S3 or S4, no murmur, click or rub, no peripheral edema and peripheral pulses strong  MS: no gross musculoskeletal defects noted, no edema    Diagnostic Test Results:  Labs reviewed in Epic         Assessment & Plan       ICD-10-CM    1. Moderate persistent asthma without complication J45.40 predniSONE (DELTASONE) 20 MG tablet     fluticasone (FLOVENT HFA) 110 MCG/ACT inhaler   2. SYLVIA (generalized anxiety disorder) F41.1    3. Severe obesity (BMI 35.0-39.9) E66.01         Patient will start prednisone today for inflammation in the airways and will begin controller inhaler for daily as her asthma is not well controlled. Advised the patient to work on relaxation breathing as well as maintaining low stress as this can trigger asthma flares. Patient is overweight and would benefit from weight loss to help reduce symptoms. Encouraged healthy diet and exercise as able.       Return in about 3 months (around 12/30/2019) for Return for scheduled annual checkup with PCP.    Aba Musa PA-C  [facility]

## 2019-09-30 NOTE — LETTER
My Asthma Action Plan    Name: Vy Villalobos   YOB: 1965  Date: 9/30/2019   My doctor: Aba Musa PA-C   My clinic: [facility]        My Control Medicine: Fluticasone propionate (Flovent HFA) - 110 mcg 1 puff twice daily  Duoneb every 4 hours  My Rescue Medicine: Albuterol (Proair/Ventolin/Proventil HFA) 2-4 puffs EVERY 4 HOURS as needed. Use a spacer if recommended by your provider.  My Oral Steroid Medicine: Prednisone per clinic visit My Asthma Severity:   Severe Persistent  Know your asthma triggers: upper respiratory infections, pollens, mold, humidity, strong odors and fumes, exercise or sports, emotions and cold air  pt is aware of triggers             GREEN ZONE   Good Control    I feel good    No cough or wheeze    Can work, sleep and play without asthma symptoms       Take your asthma control medicine every day.     1. If exercise triggers your asthma, take your rescue medication    15 minutes before exercise or sports, and    During exercise if you have asthma symptoms  2. Spacer to use with inhaler: If you have a spacer, make sure to use it with your inhaler             YELLOW ZONE Getting Worse  I have ANY of these:    I do not feel good    Cough or wheeze    Chest feels tight    Wake up at night   1. Keep taking your Green Zone medications  2. Start taking your rescue medicine:    every 20 minutes for up to 1 hour. Then every 4 hours for 24-48 hours.  3. If you stay in the Yellow Zone for more than 12-24 hours, contact your doctor.  4. If you do not return to the Green Zone in 12-24 hours or you get worse, start taking your oral steroid medicine if prescribed by your provider.           RED ZONE Medical Alert - Get Help  I have ANY of these:    I feel awful    Medicine is not helping    Breathing getting harder    Trouble walking or talking    Nose opens wide to breathe       1. Take your rescue medicine NOW  2. If your provider has prescribed an oral steroid  medicine, start taking it NOW  3. Call your doctor NOW  4. If you are still in the Red Zone after 20 minutes and you have not reached your doctor:    Take your rescue medicine again and    Call 911 or go to the emergency room right away    See your regular doctor within 2 weeks of an Emergency Room or Urgent Care visit for follow-up treatment.          Annual Reminders:  Meet with Asthma Educator,  Flu Shot in the Fall, consider Pneumonia Vaccination for patients with asthma (aged 19 and older).    Pharmacy[facility]    [id number] - [place] [place] [facility] [id number] [place], [place] - [address]  [facility] - [place], [place] - [address]                          Asthma Triggers  How To Control Things That Make Your Asthma Worse    Triggers are things that make your asthma worse.  Look at the list below to help you find your triggers and what you can do about them.  You can help prevent asthma flare-ups by staying away from your triggers.      Trigger                                                          What you can do   Cigarette Smoke  Tobacco smoke can make asthma worse. Do not allow smoking in your home, car or around you.  Be sure no one smokes at a child s day care or school.  If you smoke, ask your health care provider for ways to help you quit.  Ask family members to quit too.  Ask your health care provider for a referral to Quit Plan to help you quit smoking, or call 9-918-671-PLAN.     Colds, Flu, Bronchitis  These are common triggers of asthma. Wash your hands often.  Don t touch your eyes, nose or mouth.  Get a flu shot every year.     Dust Mites  These are tiny bugs that live in cloth or carpet. They are too small to see. Wash sheets and blankets in hot water every week.   Encase pillows and mattress in dust mite proof covers.  Avoid having carpet if you can. If you have carpet, vacuum weekly.   Use a dust mask and HEPA  vacuum.   Pollen and Outdoor Mold  Some people are allergic to trees, grass, or weed pollen, or molds. Try to keep your windows closed.  Limit time out doors when pollen count is high.   Ask you health care provider about taking medicine during allergy season.     Animal Dander  Some people are allergic to skin flakes, urine or saliva from pets with fur or feathers. Keep pets with fur or feathers out of your home.    If you can t keep the pet outdoors, then keep the pet out of your bedroom.  Keep the bedroom door closed.  Keep pets off cloth furniture and away from stuffed toys.     Mice, Rats, and Cockroaches   Some people are allergic to the waste from these pests.   Cover food and garbage.  Clean up spills and food crumbs.  Store grease in the refrigerator.   Keep food out of the bedroom.   Indoor Mold  This can be a trigger if your home has high moisture. Fix leaking faucets, pipes, or other sources of water.   Clean moldy surfaces.  Dehumidify basement if it is damp and smelly.   Smoke, Strong Odors, and Sprays  These can reduce air quality. Stay away from strong odors and sprays, such as perfume, powder, hair spray, paints, smoke incense, paint, cleaning products, candles and new carpet.   Exercise or Sports  Some people with asthma have this trigger. Be active!  Ask your doctor about taking medicine before sports or exercise to prevent symptoms.    Warm up for 5-10 minutes before and after sports or exercise.     Other Triggers of Asthma  Cold air:  Cover your nose and mouth with a scarf.  Sometimes laughing or crying can be a trigger.  Some medicines and food can trigger asthma.

## 2019-09-30 NOTE — PATIENT INSTRUCTIONS
"  Patient Education     Using an Inhaler  Your healthcare provider may prescribe medicine that you breathe in using a metered-dose inhaler (MDI). An inhaler sends a measured amount of medicine in a fine mist.  Step 1:    Shake the inhaler and remove the cap.    Take a deep breath and let it out.  Step 2:    Close your lips around the end of the inhaler mouthpiece. Or if you were told to use the \"open-mouth\" method, hold the inhaler 1 to 2 inches from your mouth.  Step 3:    Breathe in slowly and deeply as you press down on the inhaler to release the medicine.    Inhale fully.  Step 4:    Hold your breath for a count of 10, or as long as you can comfortably.    Then breathe out slowly through your mouth.    Repeat these steps for each puff of medicine prescribed.             Important    If the inhaler is being used for the first time or has not been used for a while, prime it as directed by the product maker. You can find important information about the medicine in the package insert. This is the paper that comes with the medicine.    If you use more than one inhaler, make sure you know which one to use first.    Your healthcare provider or pharmacist can show you how to use your inhaler the right way. Even if you think you are using it the right way, it is still a good idea to check.   Date Last Reviewed: 10/1/2016    5840-3593 [employer]. [address] [place] [address], [place] [address]. All rights reserved. This information is not intended as a substitute for professional medical care. Always follow your healthcare professional's instructions.           "

## 2019-10-01 ASSESSMENT — ASTHMA QUESTIONNAIRES: ACT_TOTALSCORE: 9

## 2019-12-30 ENCOUNTER — OFFICE VISIT (OUTPATIENT)
Dept: FAMILY MEDICINE | Facility: CLINIC | Age: 54
End: 2019-12-30
Payer: COMMERCIAL

## 2019-12-30 VITALS
BODY MASS INDEX: 39.07 KG/M2 | SYSTOLIC BLOOD PRESSURE: 136 MMHG | HEIGHT: 65 IN | TEMPERATURE: 97.9 F | WEIGHT: 234.5 LBS | HEART RATE: 69 BPM | RESPIRATION RATE: 13 BRPM | OXYGEN SATURATION: 96 % | DIASTOLIC BLOOD PRESSURE: 80 MMHG

## 2019-12-30 DIAGNOSIS — J45.40 MODERATE PERSISTENT ASTHMA WITHOUT COMPLICATION: ICD-10-CM

## 2019-12-30 DIAGNOSIS — Z12.31 ENCOUNTER FOR SCREENING MAMMOGRAM FOR BREAST CANCER: ICD-10-CM

## 2019-12-30 DIAGNOSIS — F41.1 GAD (GENERALIZED ANXIETY DISORDER): ICD-10-CM

## 2019-12-30 DIAGNOSIS — Z00.00 ROUTINE GENERAL MEDICAL EXAMINATION AT A HEALTH CARE FACILITY: Primary | ICD-10-CM

## 2019-12-30 DIAGNOSIS — Z12.11 SPECIAL SCREENING FOR MALIGNANT NEOPLASMS, COLON: ICD-10-CM

## 2019-12-30 DIAGNOSIS — I10 HYPERTENSION GOAL BP (BLOOD PRESSURE) < 140/90: ICD-10-CM

## 2019-12-30 DIAGNOSIS — F33.0 MILD RECURRENT MAJOR DEPRESSION (H): ICD-10-CM

## 2019-12-30 DIAGNOSIS — Z23 NEED FOR VACCINATION: ICD-10-CM

## 2019-12-30 DIAGNOSIS — J45.20 MILD INTERMITTENT ASTHMA, UNCOMPLICATED: ICD-10-CM

## 2019-12-30 DIAGNOSIS — I10 BENIGN ESSENTIAL HYPERTENSION: ICD-10-CM

## 2019-12-30 DIAGNOSIS — E78.5 HYPERLIPIDEMIA LDL GOAL <100: ICD-10-CM

## 2019-12-30 DIAGNOSIS — Z12.4 SCREENING FOR CERVICAL CANCER: ICD-10-CM

## 2019-12-30 DIAGNOSIS — R80.9 MICROALBUMINURIA: ICD-10-CM

## 2019-12-30 LAB
CHOLEST SERPL-MCNC: 186 MG/DL
GLUCOSE SERPL-MCNC: 111 MG/DL (ref 70–99)
HCV AB SERPL QL IA: NONREACTIVE
HDLC SERPL-MCNC: 54 MG/DL
HIV 1+2 AB+HIV1 P24 AG SERPL QL IA: NONREACTIVE
LDLC SERPL CALC-MCNC: 83 MG/DL
NONHDLC SERPL-MCNC: 132 MG/DL
TRIGL SERPL-MCNC: 243 MG/DL

## 2019-12-30 PROCEDURE — 87624 HPV HI-RISK TYP POOLED RSLT: CPT | Performed by: NURSE PRACTITIONER

## 2019-12-30 PROCEDURE — 82947 ASSAY GLUCOSE BLOOD QUANT: CPT | Performed by: NURSE PRACTITIONER

## 2019-12-30 PROCEDURE — 87389 HIV-1 AG W/HIV-1&-2 AB AG IA: CPT | Performed by: NURSE PRACTITIONER

## 2019-12-30 PROCEDURE — 90714 TD VACC NO PRESV 7 YRS+ IM: CPT | Performed by: NURSE PRACTITIONER

## 2019-12-30 PROCEDURE — 36415 COLL VENOUS BLD VENIPUNCTURE: CPT | Performed by: NURSE PRACTITIONER

## 2019-12-30 PROCEDURE — 80061 LIPID PANEL: CPT | Performed by: NURSE PRACTITIONER

## 2019-12-30 PROCEDURE — 99396 PREV VISIT EST AGE 40-64: CPT | Mod: 25 | Performed by: NURSE PRACTITIONER

## 2019-12-30 PROCEDURE — 86803 HEPATITIS C AB TEST: CPT | Performed by: NURSE PRACTITIONER

## 2019-12-30 PROCEDURE — 90471 IMMUNIZATION ADMIN: CPT | Performed by: NURSE PRACTITIONER

## 2019-12-30 PROCEDURE — G0145 SCR C/V CYTO,THINLAYER,RESCR: HCPCS | Performed by: NURSE PRACTITIONER

## 2019-12-30 RX ORDER — SIMVASTATIN 40 MG
40 TABLET ORAL DAILY
Qty: 90 TABLET | Refills: 2 | Status: SHIPPED | OUTPATIENT
Start: 2019-12-30 | End: 2020-11-03

## 2019-12-30 RX ORDER — FLUTICASONE PROPIONATE 110 UG/1
1 AEROSOL, METERED RESPIRATORY (INHALATION) 2 TIMES DAILY
Qty: 1 INHALER | Refills: 3 | Status: SHIPPED | OUTPATIENT
Start: 2019-12-30 | End: 2021-02-15

## 2019-12-30 RX ORDER — ALBUTEROL SULFATE 90 UG/1
2 AEROSOL, METERED RESPIRATORY (INHALATION) EVERY 6 HOURS PRN
Qty: 3 INHALER | Refills: 3 | Status: SHIPPED | OUTPATIENT
Start: 2019-12-30 | End: 2021-02-15

## 2019-12-30 RX ORDER — LOSARTAN POTASSIUM 25 MG/1
12.5 TABLET ORAL DAILY
Qty: 45 TABLET | Refills: 3 | Status: SHIPPED | OUTPATIENT
Start: 2019-12-30 | End: 2021-02-02

## 2019-12-30 RX ORDER — METOPROLOL SUCCINATE 100 MG/1
100 TABLET, EXTENDED RELEASE ORAL DAILY
Qty: 90 TABLET | Refills: 3 | Status: SHIPPED | OUTPATIENT
Start: 2019-12-30 | End: 2021-02-02

## 2019-12-30 SDOH — HEALTH STABILITY: MENTAL HEALTH: HOW OFTEN DO YOU HAVE A DRINK CONTAINING ALCOHOL?: MONTHLY OR LESS

## 2019-12-30 SDOH — HEALTH STABILITY: MENTAL HEALTH: HOW MANY STANDARD DRINKS CONTAINING ALCOHOL DO YOU HAVE ON A TYPICAL DAY?: 1 OR 2

## 2019-12-30 ASSESSMENT — PAIN SCALES - GENERAL: PAINLEVEL: NO PAIN (0)

## 2019-12-30 ASSESSMENT — PATIENT HEALTH QUESTIONNAIRE - PHQ9: SUM OF ALL RESPONSES TO PHQ QUESTIONS 1-9: 1

## 2019-12-30 ASSESSMENT — MIFFLIN-ST. JEOR: SCORE: 1656.63

## 2019-12-30 NOTE — LETTER
My Asthma Action Plan    Name: Vy Villalobos   YOB: 1965  Date: 12/30/2019   My doctor: Rj Guy NP   My clinic: [facility]        My Rescue Medicine:   Albuterol inhaler (Proair/Ventolin/Proventil HFA)  2-4 puffs EVERY 4 HOURS as needed. Use a spacer if recommended by your provider.   My Asthma Severity:   Intermittent / Exercise Induced  Know your asthma triggers: dust mites and cold air  pt is aware of triggers           GREEN ZONE   Good Control    I feel good    No cough or wheeze    Can work, sleep and play without asthma symptoms       Take your asthma control medicine every day.     1. If exercise triggers your asthma, take your rescue medication    15 minutes before exercise or sports, and    During exercise if you have asthma symptoms  2. Spacer to use with inhaler: If you have a spacer, make sure to use it with your inhaler             YELLOW ZONE Getting Worse  I have ANY of these:    I do not feel good    Cough or wheeze    Chest feels tight    Wake up at night   1. Keep taking your Green Zone medications  2. Start taking your rescue medicine:    every 20 minutes for up to 1 hour. Then every 4 hours for 24-48 hours.  3. If you stay in the Yellow Zone for more than 12-24 hours, contact your doctor.  4. If you do not return to the Green Zone in 12-24 hours or you get worse, start taking your oral steroid medicine if prescribed by your provider.           RED ZONE Medical Alert - Get Help  I have ANY of these:    I feel awful    Medicine is not helping    Breathing getting harder    Trouble walking or talking    Nose opens wide to breathe       1. Take your rescue medicine NOW  2. If your provider has prescribed an oral steroid medicine, start taking it NOW  3. Call your doctor NOW  4. If you are still in the Red Zone after 20 minutes and you have not reached your doctor:    Take your rescue medicine again and    Call 911 or go to the emergency room right  away    See your regular doctor within 2 weeks of an Emergency Room or Urgent Care visit for follow-up treatment.          Annual Reminders:  Meet with Asthma Educator,  Flu Shot in the Fall, consider Pneumonia Vaccination for patients with asthma (aged 19 and older).    Pharmacy:    [facility] [place]  [id number] - [place], [place] - [address] [id number] - [place], [place] - [address] [facility] [address]  [facility][id number] - [place], [place] - [address]    Electronically signed by Rj Guy NP   Date: 12/30/19                    Asthma Triggers  How To Control Things That Make Your Asthma Worse    Triggers are things that make your asthma worse.  Look at the list below to help you find your triggers and   what you can do about them. You can help prevent asthma flare-ups by staying away from your triggers.      Trigger                                                          What you can do   Cigarette Smoke  Tobacco smoke can make asthma worse. Do not allow smoking in your home, car or around you.  Be sure no one smokes at a child s day care or school.  If you smoke, ask your health care provider for ways to help you quit.  Ask family members to quit too.  Ask your health care provider for a referral to Quit Plan to help you quit smoking, or call 5-356-933-PLAN.     Colds, Flu, Bronchitis  These are common triggers of asthma. Wash your hands often.  Don t touch your eyes, nose or mouth.  Get a flu shot every year.     Dust Mites  These are tiny bugs that live in cloth or carpet. They are too small to see. Wash sheets and blankets in hot water every week.   Encase pillows and mattress in dust mite proof covers.  Avoid having carpet if you can. If you have carpet, vacuum weekly.   Use a dust mask and HEPA vacuum.   Pollen and Outdoor Mold  Some people are allergic to trees, grass, or weed pollen, or molds. Try to keep your windows closed.  Limit time out doors when pollen  count is high.   Ask you health care provider about taking medicine during allergy season.     Animal Dander  Some people are allergic to skin flakes, urine or saliva from pets with fur or feathers. Keep pets with fur or feathers out of your home.    If you can t keep the pet outdoors, then keep the pet out of your bedroom.  Keep the bedroom door closed.  Keep pets off cloth furniture and away from stuffed toys.     Mice, Rats, and Cockroaches  Some people are allergic to the waste from these pests.   Cover food and garbage.  Clean up spills and food crumbs.  Store grease in the refrigerator.   Keep food out of the bedroom.   Indoor Mold  This can be a trigger if your home has high moisture. Fix leaking faucets, pipes, or other sources of water.   Clean moldy surfaces.  Dehumidify basement if it is damp and smelly.   Smoke, Strong Odors, and Sprays  These can reduce air quality. Stay away from strong odors and sprays, such as perfume, powder, hair spray, paints, smoke incense, paint, cleaning products, candles and new carpet.   Exercise or Sports  Some people with asthma have this trigger. Be active!  Ask your doctor about taking medicine before sports or exercise to prevent symptoms.    Warm up for 5-10 minutes before and after sports or exercise.     Other Triggers of Asthma  Cold air:  Cover your nose and mouth with a scarf.  Sometimes laughing or crying can be a trigger.  Some medicines and food can trigger asthma.

## 2019-12-30 NOTE — PROGRESS NOTES
SUBJECTIVE:   CC: Vy Villalobos is an 54 year old woman who presents for preventive health visit.     Healthy Habits:    Do you get at least three servings of calcium containing foods daily (dairy, green leafy vegetables, etc.)? No, taking calcium and/or vitamin D supplement: yes - calcium plus d    Amount of exercise or daily activities, outside of work: she is active at work    Problems taking medications regularly No    Medication side effects: No    Have you had an eye exam in the past two years? yes    Do you see a dentist twice per year? no    Do you have sleep apnea, excessive snoring or daytime drowsiness?no          Today's PHQ-2 Score:   PHQ-2 ( 1999 Pfizer) 3/12/2019 2/19/2018   Q1: Little interest or pleasure in doing things 0 0   Q2: Feeling down, depressed or hopeless 0 0   PHQ-2 Score 0 0   Q1: Little interest or pleasure in doing things - Not at all   Q2: Feeling down, depressed or hopeless - Not at all   PHQ-2 Score - 0       Abuse: Current or Past(Physical, Sexual or Emotional)- No  Do you feel safe in your environment? Yes        Social History     Tobacco Use     Smoking status: Never Smoker     Smokeless tobacco: Never Used   Substance Use Topics     Alcohol use: Yes     Frequency: Monthly or less     Drinks per session: 1 or 2     Comment: socially     If you drink alcohol do you typically have >3 drinks per day or >7 drinks per week? No                     Reviewed orders with patient.  Reviewed health maintenance and updated orders accordingly - Yes  Lab work is in process  Labs reviewed in EPIC  BP Readings from Last 3 Encounters:   12/30/19 136/80   09/30/19 134/80   08/28/19 (!) 141/83    Wt Readings from Last 3 Encounters:   12/30/19 106.4 kg (234 lb 8 oz)   09/30/19 105.1 kg (231 lb 11.2 oz)   08/28/19 109.8 kg (242 lb)                  Patient Active Problem List   Diagnosis     Severe obesity (BMI 35.0-39.9)     Cardiomegaly     Mitral valve disorder     Leiomyoma of uterus      Hyperlipidemia LDL goal <100     Hypertension goal BP (blood pressure) < 140/90     Snoring     Family history of malignant neoplasm of breast     Elevated TSH     SYLVIA (generalized anxiety disorder)     Abnormal fasting glucose     Mild intermittent asthma, uncomplicated     Microalbuminuria     Past Surgical History:   Procedure Laterality Date     HC HYSTEROSCOPY W ENDOMETRIAL BX/POLYPECTOMY W/WO D&C  10/26/2000    Hysteroscopy, dilation and curettage, and diagnostic laparoscopy     [facility] REMOVAL OF OVARIAN CYST(S)      laparascopic ovarian cystectomy.     HYSTEROSCOPY  2007    Hysteroscopy, D&C and endometrial ablation.     RELEASE TRIGGER FINGER Left 2019    Procedure: left ring trigger finger release;  Surgeon: Rodger Hawkins DO;  Location: [facility]     TUBAL LIGATION             Social History     Tobacco Use     Smoking status: Never Smoker     Smokeless tobacco: Never Used   Substance Use Topics     Alcohol use: Yes     Frequency: Monthly or less     Drinks per session: 1 or 2     Comment: socially     Family History   Problem Relation Age of Onset     Hypertension Mother      Arthritis Mother      Eye Disorder Mother         glaucoma     Lipids Mother      Hypertension Father      Eye Disorder Father         glaucoma     Lipids Father      Respiratory Father         asthma     Breast Cancer Maternal Aunt          in her 40's     Alzheimer Disease Paternal Aunt         dx at 69     Eye Disorder Maternal Grandmother         glaucoma         Current Outpatient Medications   Medication Sig Dispense Refill     CALCIUM-VITAMIN D PO Take  by mouth. daily       losartan (COZAAR) 25 MG tablet Take 0.5 tablets (12.5 mg) by mouth daily 45 tablet 3     metoprolol succinate ER (TOPROL-XL) 100 MG 24 hr tablet Take 1 tablet (100 mg) by mouth daily 90 tablet 3     Misc Natural Product Nasal (PONARIS) SOLN Spray in nostril daily Two drops to both nostrils at bedtime       Multiple Vitamins-Minerals  (CENTRUM PO) Take  by mouth.       mupirocin (BACTROBAN) 2 % external ointment Apply topically 3 times daily In each nostril every night       sertraline (ZOLOFT) 50 MG tablet Take 1 tablet (50 mg) by mouth daily 90 tablet 3     simvastatin (ZOCOR) 40 MG tablet Take 1 tablet (40 mg) by mouth daily 90 tablet 2     VITAMIN C, CALCIUM ASCORBATE, PO Take  by mouth. daily       albuterol (PROAIR HFA/PROVENTIL HFA/VENTOLIN HFA) 108 (90 Base) MCG/ACT inhaler Inhale 2 puffs into the lungs every 6 hours as needed for shortness of breath / dyspnea (Patient not taking: Reported on 12/30/2019) 3 Inhaler 3     fluticasone (FLOVENT HFA) 110 MCG/ACT inhaler Inhale 1 puff into the lungs 2 times daily (Patient not taking: Reported on 12/30/2019) 1 Inhaler 3     ipratropium - albuterol 0.5 mg/2.5 mg/3 mL (DUONEB) 0.5-2.5 (3) MG/3ML neb solution TAKE 1 VIAL (3 MLS) BY NEBULIZATION EVERY 6 HOURS AS NEEDED FOR SHORTN ESS OF BREATH / DYSPNEA (Patient not taking: Reported on 12/30/2019) 180 mL 4     Allergies   Allergen Reactions     Doxycycline Itching     Miconazole Rash           Pertinent mammograms are reviewed under the imaging tab.  History of abnormal Pap smear: NO - age 30-65 PAP every 5 years with negative HPV co-testing recommended  PAP / HPV 12/28/2015 10/22/2012 8/11/2009   PAP NIL NIL NIL     Reviewed and updated as needed this visit by clinical staff  Tobacco  Soc Hx        Reviewed and updated as needed this visit by Provider            ROS:  CONSTITUTIONAL: NEGATIVE for fever, chills, change in weight  INTEGUMENTARY/SKIN: NEGATIVE for worrisome rashes, moles or lesions  EYES: NEGATIVE for vision changes or irritation  ENT: NEGATIVE for ear, mouth and throat problems  RESP: NEGATIVE for significant cough or SOB  BREAST: NEGATIVE for masses, tenderness or discharge  CV: NEGATIVE for chest pain, palpitations or peripheral edema  GI: NEGATIVE for nausea, abdominal pain, heartburn, or change in bowel habits  : NEGATIVE for  "unusual urinary or vaginal symptoms. No vaginal bleeding.  MUSCULOSKELETAL: NEGATIVE for significant arthralgias or myalgia  NEURO: NEGATIVE for weakness, dizziness or paresthesias  PSYCHIATRIC: NEGATIVE for changes in mood or affect     OBJECTIVE:   /80   Pulse 69   Temp 97.9  F (36.6  C) (Tympanic)   Resp 13   Ht 1.638 m (5' 4.5\")   Wt 106.4 kg (234 lb 8 oz)   LMP 02/19/2017   SpO2 96%   BMI 39.63 kg/m    EXAM:  GENERAL APPEARANCE: healthy, alert and no distress  EYES: Eyes grossly normal to inspection, PERRL and conjunctivae and sclerae normal  HENT: ear canals and TM's normal, nose and mouth without ulcers or lesions, oropharynx clear and oral mucous membranes moist  NECK: no adenopathy, no asymmetry, masses, or scars and thyroid normal to palpation  RESP: lungs clear to auscultation - no rales, rhonchi or wheezes  BREAST: normal without masses, tenderness or nipple discharge and no palpable axillary masses or adenopathy  CV: regular rate and rhythm, normal S1 S2, no S3 or S4, no murmur, click or rub, no peripheral edema and peripheral pulses strong  ABDOMEN: soft, nontender, no hepatosplenomegaly, no masses and bowel sounds normal   (female): normal female external genitalia, normal urethral meatus, vaginal mucosal atrophy noted, normal cervix, adnexae, and uterus without masses or abnormal discharge  MS: no musculoskeletal defects are noted and gait is age appropriate without ataxia  SKIN: no suspicious lesions or rashes  NEURO: Normal strength and tone, sensory exam grossly normal, mentation intact and speech normal  PSYCH: mentation appears normal and affect normal/bright    Patient is a pleasant 54 year old female patient who presents for her health maintenance exam. She is doing well. Mood is well controlled using the flovent daily. The mood is well controlled on the Zoloft, and blood pressure has been stable as well. No new concerning side effects from the medications she is on. She is " working on healthy diet to drop some weight. Has not gone as well over the holidays but she is down weight from August overall. 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 No other new concerning symptoms she would like to discuss. She would like to get mammogram scheduled for March and needs her Tetanus shot.     ASSESSMENT/PLAN:       Vy was seen today for physical.    Diagnoses and all orders for this visit:    Routine general medical examination at a health care facility  -     Lipid panel reflex to direct LDL Fasting  -     Hepatitis C Screen Reflex to HCV RNA Quant and Genotype  -     HIV Screening  -     Glucose    Screening for cervical cancer  -     Pap imaged thin layer screen with HPV - recommended age 30 - 65  -     HPV High Risk Types DNA Cervical    Special screening for malignant neoplasms, colon  -     Fecal colorectal cancer screen (FIT); Future    Encounter for screening mammogram for breast cancer  -     *MA Screening Digital Bilateral; Future    Microalbuminuria  -     losartan (COZAAR) 25 MG tablet; Take 0.5 tablets (12.5 mg) by mouth daily    Hypertension goal BP (blood pressure) < 140/90  -     losartan (COZAAR) 25 MG tablet; Take 0.5 tablets (12.5 mg) by mouth daily  -     metoprolol succinate ER (TOPROL-XL) 100 MG 24 hr tablet; Take 1 tablet (100 mg) by mouth daily    Benign essential hypertension  -     losartan (COZAAR) 25 MG tablet; Take 0.5 tablets (12.5 mg) by mouth daily    SYLVIA (generalized anxiety disorder)  -     sertraline (ZOLOFT) 50 MG tablet; Take 1 tablet (50 mg) by mouth daily    Mild recurrent major depression (H)  -     sertraline (ZOLOFT) 50 MG tablet; Take 1 tablet (50 mg) by mouth daily    Hyperlipidemia LDL goal <100  -     simvastatin (ZOCOR) 40 MG tablet; Take 1 tablet (40 mg) by mouth daily    Mild intermittent asthma, uncomplicated  -     albuterol (PROAIR HFA/PROVENTIL HFA/VENTOLIN HFA) 108 (90 Base) MCG/ACT inhaler; Inhale 2 puffs  "into the lungs every 6 hours as needed for shortness of breath / dyspnea    Moderate persistent asthma without complication  -     fluticasone (FLOVENT HFA) 110 MCG/ACT inhaler; Inhale 1 puff into the lungs 2 times daily      Patient is stable and doing well. We will continue with current plan of care. Labs today. Patient will be notified of labs results and any changes to her plan of care required due to the lab results. The mammogram will be scheduled for March when it is due.     Patient verbalized understanding of plan of care and is in agreement with current plan of care.     Discussed health maintenance needs moving forward and how to contact clinic using [facility] for questions or concerns prior to her follow up appointment.     COUNSELING:   Reviewed preventive health counseling, as reflected in patient instructions  Special attention given to:        Regular exercise       Healthy diet/nutrition       Osteoporosis Prevention/Bone Health       (Jenifer)menopause management    Estimated body mass index is 39.63 kg/m  as calculated from the following:    Height as of this encounter: 1.638 m (5' 4.5\").    Weight as of this encounter: 106.4 kg (234 lb 8 oz).    Weight management plan: Discussed healthy diet and exercise guidelines     reports that she has never smoked. She has never used smokeless tobacco.      Counseling Resources:  ATP IV Guidelines  Pooled Cohorts Equation Calculator  Breast Cancer Risk Calculator  FRAX Risk Assessment  ICSI Preventive Guidelines  Dietary Guidelines for Americans, 2010  USDA's MyPlate  ASA Prophylaxis  Lung CA Screening    Rj Guy NP  [facility]  "

## 2019-12-31 DIAGNOSIS — Z12.11 SPECIAL SCREENING FOR MALIGNANT NEOPLASMS, COLON: ICD-10-CM

## 2019-12-31 DIAGNOSIS — R73.09 ELEVATED GLUCOSE: ICD-10-CM

## 2019-12-31 DIAGNOSIS — R73.09 ELEVATED GLUCOSE: Primary | ICD-10-CM

## 2019-12-31 LAB
HBA1C MFR BLD: 5.5 % (ref 0–5.6)
HEMOCCULT STL QL IA: NEGATIVE

## 2019-12-31 PROCEDURE — 36415 COLL VENOUS BLD VENIPUNCTURE: CPT | Performed by: NURSE PRACTITIONER

## 2019-12-31 PROCEDURE — 83036 HEMOGLOBIN GLYCOSYLATED A1C: CPT | Mod: QW | Performed by: NURSE PRACTITIONER

## 2019-12-31 PROCEDURE — 82274 ASSAY TEST FOR BLOOD FECAL: CPT | Performed by: NURSE PRACTITIONER

## 2019-12-31 ASSESSMENT — ASTHMA QUESTIONNAIRES: ACT_TOTALSCORE: 24

## 2020-01-02 LAB
COPATH REPORT: NORMAL
PAP: NORMAL

## 2020-01-03 LAB
FINAL DIAGNOSIS: NORMAL
HPV HR 12 DNA CVX QL NAA+PROBE: NEGATIVE
HPV16 DNA SPEC QL NAA+PROBE: NEGATIVE
HPV18 DNA SPEC QL NAA+PROBE: NEGATIVE
SPECIMEN DESCRIPTION: NORMAL
SPECIMEN SOURCE CVX/VAG CYTO: NORMAL

## 2020-02-21 ENCOUNTER — MYC MEDICAL ADVICE (OUTPATIENT)
Dept: FAMILY MEDICINE | Facility: CLINIC | Age: 55
End: 2020-02-21

## 2020-02-21 DIAGNOSIS — J45.20 MILD INTERMITTENT ASTHMA, UNCOMPLICATED: Primary | ICD-10-CM

## 2020-02-21 RX ORDER — PREDNISONE 10 MG/1
TABLET ORAL
Qty: 65 TABLET | Refills: 0 | Status: SHIPPED | OUTPATIENT
Start: 2020-02-21 | End: 2020-03-13

## 2020-02-21 NOTE — TELEPHONE ENCOUNTER
Stop the Fluticasone in haler.    Use the Albuterol inhaler 2 puffs every 6 hours as directed.     Use the Beclomethasone inhaler 2 times daily. (Steroid)    Use the Nebulizer instead of the Albuterol inhaler when at home.     I am going to start you on a steroid taper to help with this exacerbation. Take as directed, take the steroids with food.     If symptoms do not improve or get worse call clinic or present to ER.     Rj Guy CNP     No complaints

## 2020-02-23 DIAGNOSIS — E78.5 HYPERLIPIDEMIA LDL GOAL <100: ICD-10-CM

## 2020-02-24 NOTE — TELEPHONE ENCOUNTER
Simvastatin  Last Written Prescription Date:  12/30/2019  Last Fill Quantity: 90,  # refills: 2  Last office visit: 12/30/2019 with prescribing provider:   Future Office Visit:  None    Lisa Cerrato RN BSN

## 2020-02-24 NOTE — TELEPHONE ENCOUNTER
Left message for patient to call clinic. Please clarify that she would like Prescription transferred to [facility]. If she does she can call pharmacy and transfer all her medications. She has refills remaining at [facility].  Lisa Cerrato RN BSN

## 2020-02-26 RX ORDER — SIMVASTATIN 40 MG
TABLET ORAL
Qty: 90 TABLET | Refills: 2 | OUTPATIENT
Start: 2020-02-26

## 2020-03-02 ENCOUNTER — MYC MEDICAL ADVICE (OUTPATIENT)
Dept: FAMILY MEDICINE | Facility: CLINIC | Age: 55
End: 2020-03-02

## 2020-03-07 ENCOUNTER — E-VISIT (OUTPATIENT)
Dept: FAMILY MEDICINE | Facility: CLINIC | Age: 55
End: 2020-03-07
Payer: COMMERCIAL

## 2020-03-07 DIAGNOSIS — Z53.9 ERRONEOUS ENCOUNTER--DISREGARD: Primary | ICD-10-CM

## 2020-03-09 ENCOUNTER — E-VISIT (OUTPATIENT)
Dept: FAMILY MEDICINE | Facility: CLINIC | Age: 55
End: 2020-03-09
Payer: COMMERCIAL

## 2020-03-09 DIAGNOSIS — J01.90 ACUTE SINUSITIS WITH SYMPTOMS > 10 DAYS: Primary | ICD-10-CM

## 2020-03-09 PROCEDURE — 99422 OL DIG E/M SVC 11-20 MIN: CPT | Performed by: NURSE PRACTITIONER

## 2020-03-09 NOTE — TELEPHONE ENCOUNTER
Provider E-Visit time total (minutes): 15    Reviewed patient visit notes.     Allergies.     Will start her on a course of Augmentin BID for 10 days.     Please have patient call clinic if symptoms do not improve over the next 7-10 days.     Rj Guy CNP

## 2020-03-28 DIAGNOSIS — F41.1 GAD (GENERALIZED ANXIETY DISORDER): ICD-10-CM

## 2020-03-28 DIAGNOSIS — F33.0 MILD RECURRENT MAJOR DEPRESSION (H): ICD-10-CM

## 2020-03-30 NOTE — TELEPHONE ENCOUNTER
Prescription was sent 12/30/19 for #90 with 3 refills.  Pharmacy notified via E-Prescribe refusal.     Elyse Rossi RN on 3/30/2020 at 9:59 AM

## 2020-03-30 NOTE — TELEPHONE ENCOUNTER
"Requested Prescriptions   Pending Prescriptions Disp Refills     sertraline (ZOLOFT) 50 MG tablet [Pharmacy Med Name: SERTRALINE 50MG TABLETS] 90 tablet 3     Sig: TAKE 1 TABLET BY MOUTH EVERY DAY   Last Written Prescription Date:  12/30/19  Last Fill Quantity: 90,  # refills: 3   Last office visit: 12/30/2019 with prescribing provider:  Malena   Future Office Visit:        SSRIs Protocol Passed - 3/28/2020  3:27 AM        Passed - PHQ-9 score less than 5 in past 6 months     Please review last PHQ-9 score.   PHQ-9 score:    PHQ 12/30/2019   PHQ-9 Total Score 1   Q9: Thoughts of better off dead/self-harm past 2 weeks Not at all           Passed - Medication is active on med list        Passed - Patient is age 18 or older        Passed - No active pregnancy on record        Passed - No positive pregnancy test in last 12 months        Passed - Recent (6 mo) or future (30 days) visit within the authorizing provider's specialty     Patient had office visit in the last 6 months or has a visit in the next 30 days with authorizing provider or within the authorizing provider's specialty.  See \"Patient Info\" tab in inbasket, or \"Choose Columns\" in Meds & Orders section of the refill encounter.                 "

## 2020-07-24 ENCOUNTER — TELEPHONE (OUTPATIENT)
Dept: FAMILY MEDICINE | Facility: CLINIC | Age: 55
End: 2020-07-24

## 2020-07-24 ENCOUNTER — MYC MEDICAL ADVICE (OUTPATIENT)
Dept: FAMILY MEDICINE | Facility: CLINIC | Age: 55
End: 2020-07-24

## 2020-07-24 NOTE — TELEPHONE ENCOUNTER
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 A cloth, surgical, a shield one or one with a shield. Patient is aware that provider is out of the office until 7/30/20 and is ok to wait until then. Please call to advise.     Phone Number Patient can be reached at: Cell number on file:    Telephone Information:   Mobile [phone]       Best Time: any     Can we leave a detailed message on this number? YES    Call taken on 7/24/2020 at 8:44 AM by Mikaela Burgos

## 2020-07-29 NOTE — TELEPHONE ENCOUNTER
Any of these would be appropriate as long as not a N-95, which are not recommended.     I would think a shield would be most tolerated in terms of her feeling like she has the best airflow.     Rj Guy CNP

## 2020-08-27 ENCOUNTER — MYC MEDICAL ADVICE (OUTPATIENT)
Dept: FAMILY MEDICINE | Facility: CLINIC | Age: 55
End: 2020-08-27

## 2021-02-14 ASSESSMENT — ENCOUNTER SYMPTOMS
DYSURIA: 0
NAUSEA: 0
SHORTNESS OF BREATH: 0
HEARTBURN: 0
EYE PAIN: 0
CHILLS: 0
DIZZINESS: 0
DIARRHEA: 0
WEAKNESS: 0
PALPITATIONS: 0
HEADACHES: 0
BREAST MASS: 0
HEMATOCHEZIA: 0
ARTHRALGIAS: 0
SORE THROAT: 0
JOINT SWELLING: 0
ABDOMINAL PAIN: 0
MYALGIAS: 0
HEMATURIA: 0
NERVOUS/ANXIOUS: 0
COUGH: 0
PARESTHESIAS: 0
CONSTIPATION: 0
FEVER: 0
FREQUENCY: 0

## 2021-02-15 ENCOUNTER — OFFICE VISIT (OUTPATIENT)
Dept: FAMILY MEDICINE | Facility: CLINIC | Age: 56
End: 2021-02-15
Payer: COMMERCIAL

## 2021-02-15 ENCOUNTER — HOSPITAL ENCOUNTER (OUTPATIENT)
Dept: MAMMOGRAPHY | Facility: CLINIC | Age: 56
Discharge: HOME | End: 2021-02-15
Attending: NURSE PRACTITIONER | Admitting: NURSE PRACTITIONER
Payer: COMMERCIAL

## 2021-02-15 VITALS
WEIGHT: 241 LBS | TEMPERATURE: 96.6 F | SYSTOLIC BLOOD PRESSURE: 132 MMHG | HEIGHT: 66 IN | BODY MASS INDEX: 38.73 KG/M2 | DIASTOLIC BLOOD PRESSURE: 78 MMHG | RESPIRATION RATE: 14 BRPM | OXYGEN SATURATION: 99 % | HEART RATE: 69 BPM

## 2021-02-15 DIAGNOSIS — Z12.31 ENCOUNTER FOR SCREENING MAMMOGRAM FOR BREAST CANCER: ICD-10-CM

## 2021-02-15 DIAGNOSIS — Z12.31 VISIT FOR SCREENING MAMMOGRAM: ICD-10-CM

## 2021-02-15 DIAGNOSIS — J45.20 MILD INTERMITTENT ASTHMA, UNCOMPLICATED: ICD-10-CM

## 2021-02-15 DIAGNOSIS — F41.1 GAD (GENERALIZED ANXIETY DISORDER): ICD-10-CM

## 2021-02-15 DIAGNOSIS — E78.5 HYPERLIPIDEMIA LDL GOAL <100: ICD-10-CM

## 2021-02-15 DIAGNOSIS — Z12.11 SCREENING FOR COLON CANCER: ICD-10-CM

## 2021-02-15 DIAGNOSIS — F33.0 MILD RECURRENT MAJOR DEPRESSION (H): ICD-10-CM

## 2021-02-15 DIAGNOSIS — I10 HYPERTENSION GOAL BP (BLOOD PRESSURE) < 140/90: ICD-10-CM

## 2021-02-15 DIAGNOSIS — I10 BENIGN ESSENTIAL HYPERTENSION: ICD-10-CM

## 2021-02-15 DIAGNOSIS — B35.4 TINEA CORPORIS: ICD-10-CM

## 2021-02-15 DIAGNOSIS — Z00.00 ROUTINE GENERAL MEDICAL EXAMINATION AT A HEALTH CARE FACILITY: Primary | ICD-10-CM

## 2021-02-15 DIAGNOSIS — R80.9 MICROALBUMINURIA: ICD-10-CM

## 2021-02-15 LAB
ALT SERPL W P-5'-P-CCNC: 34 U/L (ref 0–50)
ANION GAP SERPL CALCULATED.3IONS-SCNC: 6 MMOL/L (ref 3–14)
BUN SERPL-MCNC: 15 MG/DL (ref 7–30)
CALCIUM SERPL-MCNC: 9.5 MG/DL (ref 8.5–10.1)
CHLORIDE SERPL-SCNC: 110 MMOL/L (ref 94–109)
CHOLEST SERPL-MCNC: 193 MG/DL
CO2 SERPL-SCNC: 27 MMOL/L (ref 20–32)
CREAT SERPL-MCNC: 0.66 MG/DL (ref 0.52–1.04)
GFR SERPL CREATININE-BSD FRML MDRD: >90 ML/MIN/{1.73_M2}
GLUCOSE SERPL-MCNC: 105 MG/DL (ref 70–99)
HDLC SERPL-MCNC: 52 MG/DL
LDLC SERPL CALC-MCNC: 105 MG/DL
NONHDLC SERPL-MCNC: 141 MG/DL
POTASSIUM SERPL-SCNC: 4 MMOL/L (ref 3.4–5.3)
SODIUM SERPL-SCNC: 143 MMOL/L (ref 133–144)
TRIGL SERPL-MCNC: 179 MG/DL

## 2021-02-15 PROCEDURE — 36415 COLL VENOUS BLD VENIPUNCTURE: CPT | Performed by: NURSE PRACTITIONER

## 2021-02-15 PROCEDURE — 80061 LIPID PANEL: CPT | Performed by: NURSE PRACTITIONER

## 2021-02-15 PROCEDURE — 99396 PREV VISIT EST AGE 40-64: CPT | Performed by: NURSE PRACTITIONER

## 2021-02-15 PROCEDURE — 84460 ALANINE AMINO (ALT) (SGPT): CPT | Performed by: NURSE PRACTITIONER

## 2021-02-15 PROCEDURE — 80048 BASIC METABOLIC PNL TOTAL CA: CPT | Performed by: NURSE PRACTITIONER

## 2021-02-15 PROCEDURE — 77063 BREAST TOMOSYNTHESIS BI: CPT

## 2021-02-15 RX ORDER — IPRATROPIUM BROMIDE AND ALBUTEROL SULFATE 2.5; .5 MG/3ML; MG/3ML
SOLUTION RESPIRATORY (INHALATION)
Qty: 180 ML | Refills: 4 | Status: SHIPPED | OUTPATIENT
Start: 2021-02-15 | End: 2022-01-27

## 2021-02-15 RX ORDER — FLUCONAZOLE 200 MG/1
TABLET ORAL
Qty: 60 TABLET | Refills: 0 | Status: SHIPPED | OUTPATIENT
Start: 2021-02-15 | End: 2022-01-27

## 2021-02-15 RX ORDER — METOPROLOL SUCCINATE 100 MG/1
100 TABLET, EXTENDED RELEASE ORAL DAILY
Qty: 90 TABLET | Refills: 3 | Status: SHIPPED | OUTPATIENT
Start: 2021-02-15 | End: 2022-01-27

## 2021-02-15 RX ORDER — LOSARTAN POTASSIUM 25 MG/1
TABLET ORAL
Qty: 45 TABLET | Refills: 4 | Status: SHIPPED | OUTPATIENT
Start: 2021-02-15 | End: 2022-01-27

## 2021-02-15 RX ORDER — ALBUTEROL SULFATE 90 UG/1
2 AEROSOL, METERED RESPIRATORY (INHALATION) EVERY 6 HOURS PRN
Qty: 3 INHALER | Refills: 3 | Status: SHIPPED | OUTPATIENT
Start: 2021-02-15 | End: 2022-01-27

## 2021-02-15 RX ORDER — SIMVASTATIN 40 MG
40 TABLET ORAL DAILY
Qty: 90 TABLET | Refills: 3 | Status: SHIPPED | OUTPATIENT
Start: 2021-02-15 | End: 2022-01-27

## 2021-02-15 ASSESSMENT — ASTHMA QUESTIONNAIRES
ACT_TOTALSCORE: 23
QUESTION_1 LAST FOUR WEEKS HOW MUCH OF THE TIME DID YOUR ASTHMA KEEP YOU FROM GETTING AS MUCH DONE AT WORK, SCHOOL OR AT HOME: NONE OF THE TIME
QUESTION_3 LAST FOUR WEEKS HOW OFTEN DID YOUR ASTHMA SYMPTOMS (WHEEZING, COUGHING, SHORTNESS OF BREATH, CHEST TIGHTNESS OR PAIN) WAKE YOU UP AT NIGHT OR EARLIER THAN USUAL IN THE MORNING: NOT AT ALL
QUESTION_4 LAST FOUR WEEKS HOW OFTEN HAVE YOU USED YOUR RESCUE INHALER OR NEBULIZER MEDICATION (SUCH AS ALBUTEROL): NOT AT ALL
QUESTION_5 LAST FOUR WEEKS HOW WOULD YOU RATE YOUR ASTHMA CONTROL: WELL CONTROLLED
QUESTION_2 LAST FOUR WEEKS HOW OFTEN HAVE YOU HAD SHORTNESS OF BREATH: ONCE OR TWICE A WEEK

## 2021-02-15 ASSESSMENT — PATIENT HEALTH QUESTIONNAIRE - PHQ9: SUM OF ALL RESPONSES TO PHQ QUESTIONS 1-9: 4

## 2021-02-15 ASSESSMENT — PAIN SCALES - GENERAL: PAINLEVEL: NO PAIN (0)

## 2021-02-15 ASSESSMENT — MIFFLIN-ST. JEOR: SCORE: 1696.98

## 2021-02-15 NOTE — LETTER
February 16, 2021      Vy Villalobos  [address] [place] [address]        Dear ,    We are writing to inform you of your test results.    cholesterol is stable and as expected, the liver function looks good, her blood sugar is mildly elevated but better than last year. Watch processed sugars and carbohydrates, we will recheck fasting labs next year.     Thank you,       Rj Guy CNP     Resulted Orders   Lipid panel reflex to direct LDL Fasting   Result Value Ref Range    Cholesterol 193 <200 mg/dL    Triglycerides 179 (H) <150 mg/dL      Comment:      Borderline high:  150-199 mg/dl  High:             200-499 mg/dl  Very high:       >499 mg/dl      HDL Cholesterol 52 >49 mg/dL    LDL Cholesterol Calculated 105 (H) <100 mg/dL      Comment:      Above desirable:  100-129 mg/dl  Borderline High:  130-159 mg/dL  High:             160-189 mg/dL  Very high:       >189 mg/dl      Non HDL Cholesterol 141 (H) <130 mg/dL      Comment:      Above Desirable:  130-159 mg/dl  Borderline high:  160-189 mg/dl  High:             190-219 mg/dl  Very high:       >219 mg/dl     Basic metabolic panel   Result Value Ref Range    Sodium 143 133 - 144 mmol/L    Potassium 4.0 3.4 - 5.3 mmol/L    Chloride 110 (H) 94 - 109 mmol/L    Carbon Dioxide 27 20 - 32 mmol/L    Anion Gap 6 3 - 14 mmol/L    Glucose 105 (H) 70 - 99 mg/dL    Urea Nitrogen 15 7 - 30 mg/dL    Creatinine 0.66 0.52 - 1.04 mg/dL    GFR Estimate >90 >60 mL/min/[1.73_m2]      Comment:      Non  GFR Calc  Starting 12/18/2018, serum creatinine based estimated GFR (eGFR) will be   calculated using the Chronic Kidney Disease Epidemiology Collaboration   (CKD-EPI) equation.      GFR Estimate If Black >90 >60 mL/min/[1.73_m2]      Comment:       GFR Calc  Starting 12/18/2018, serum creatinine based estimated GFR (eGFR) will be   calculated using the Chronic Kidney Disease Epidemiology Collaboration   (CKD-EPI) equation.      Calcium 9.5  8.5 - 10.1 mg/dL   ALT   Result Value Ref Range    ALT 34 0 - 50 U/L       If you have any questions or concerns, please call the clinic at the number listed above.
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
## 2021-02-16 ASSESSMENT — ASTHMA QUESTIONNAIRES: ACT_TOTALSCORE: 23

## 2021-02-20 DIAGNOSIS — Z12.11 SCREENING FOR COLON CANCER: ICD-10-CM

## 2021-02-20 LAB — HEMOCCULT STL QL IA: NEGATIVE

## 2021-02-20 PROCEDURE — 82274 ASSAY TEST FOR BLOOD FECAL: CPT | Performed by: NURSE PRACTITIONER

## 2021-10-23 ENCOUNTER — HEALTH MAINTENANCE LETTER (OUTPATIENT)
Age: 56
End: 2021-10-23

## 2022-01-27 ENCOUNTER — OFFICE VISIT (OUTPATIENT)
Dept: FAMILY MEDICINE | Facility: CLINIC | Age: 57
End: 2022-01-27
Payer: COMMERCIAL

## 2022-01-27 VITALS
HEIGHT: 64 IN | BODY MASS INDEX: 40.97 KG/M2 | OXYGEN SATURATION: 99 % | RESPIRATION RATE: 18 BRPM | WEIGHT: 240 LBS | DIASTOLIC BLOOD PRESSURE: 72 MMHG | TEMPERATURE: 98.9 F | HEART RATE: 69 BPM | SYSTOLIC BLOOD PRESSURE: 130 MMHG

## 2022-01-27 DIAGNOSIS — E78.5 HYPERLIPIDEMIA LDL GOAL <100: ICD-10-CM

## 2022-01-27 DIAGNOSIS — R06.83 SNORING: ICD-10-CM

## 2022-01-27 DIAGNOSIS — I10 BENIGN ESSENTIAL HYPERTENSION: ICD-10-CM

## 2022-01-27 DIAGNOSIS — R79.89 ABNORMAL TSH: ICD-10-CM

## 2022-01-27 DIAGNOSIS — R80.9 MICROALBUMINURIA: ICD-10-CM

## 2022-01-27 DIAGNOSIS — J45.20 MILD INTERMITTENT ASTHMA, UNCOMPLICATED: ICD-10-CM

## 2022-01-27 DIAGNOSIS — F33.42 MAJOR DEPRESSIVE DISORDER, RECURRENT EPISODE, IN FULL REMISSION (H): ICD-10-CM

## 2022-01-27 DIAGNOSIS — E66.01 MORBID OBESITY (H): ICD-10-CM

## 2022-01-27 DIAGNOSIS — Z76.89 ENCOUNTER TO ESTABLISH CARE: Primary | ICD-10-CM

## 2022-01-27 DIAGNOSIS — F41.1 GAD (GENERALIZED ANXIETY DISORDER): ICD-10-CM

## 2022-01-27 LAB
ANION GAP SERPL CALCULATED.3IONS-SCNC: 7 MMOL/L (ref 3–14)
BUN SERPL-MCNC: 13 MG/DL (ref 7–30)
CALCIUM SERPL-MCNC: 9.5 MG/DL (ref 8.5–10.1)
CHLORIDE BLD-SCNC: 108 MMOL/L (ref 94–109)
CHOLEST SERPL-MCNC: 152 MG/DL
CO2 SERPL-SCNC: 27 MMOL/L (ref 20–32)
CREAT SERPL-MCNC: 0.67 MG/DL (ref 0.52–1.04)
FASTING STATUS PATIENT QL REPORTED: YES
GFR SERPL CREATININE-BSD FRML MDRD: >90 ML/MIN/1.73M2
GLUCOSE BLD-MCNC: 117 MG/DL (ref 70–99)
HDLC SERPL-MCNC: 40 MG/DL
LDLC SERPL CALC-MCNC: 78 MG/DL
NONHDLC SERPL-MCNC: 112 MG/DL
POTASSIUM BLD-SCNC: 4 MMOL/L (ref 3.4–5.3)
SODIUM SERPL-SCNC: 142 MMOL/L (ref 133–144)
T4 FREE SERPL-MCNC: 1 NG/DL (ref 0.76–1.46)
TRIGL SERPL-MCNC: 171 MG/DL
TSH SERPL DL<=0.005 MIU/L-ACNC: 4.73 MU/L (ref 0.4–4)

## 2022-01-27 PROCEDURE — 80048 BASIC METABOLIC PNL TOTAL CA: CPT | Performed by: NURSE PRACTITIONER

## 2022-01-27 PROCEDURE — 36415 COLL VENOUS BLD VENIPUNCTURE: CPT | Performed by: NURSE PRACTITIONER

## 2022-01-27 PROCEDURE — 84439 ASSAY OF FREE THYROXINE: CPT | Performed by: NURSE PRACTITIONER

## 2022-01-27 PROCEDURE — 99214 OFFICE O/P EST MOD 30 MIN: CPT | Performed by: NURSE PRACTITIONER

## 2022-01-27 PROCEDURE — 80061 LIPID PANEL: CPT | Performed by: NURSE PRACTITIONER

## 2022-01-27 PROCEDURE — 96127 BRIEF EMOTIONAL/BEHAV ASSMT: CPT | Performed by: NURSE PRACTITIONER

## 2022-01-27 PROCEDURE — 84443 ASSAY THYROID STIM HORMONE: CPT | Performed by: NURSE PRACTITIONER

## 2022-01-27 RX ORDER — ALBUTEROL SULFATE 90 UG/1
2 AEROSOL, METERED RESPIRATORY (INHALATION) EVERY 6 HOURS PRN
Qty: 18 G | Refills: 1 | Status: SHIPPED | OUTPATIENT
Start: 2022-01-27 | End: 2023-09-21

## 2022-01-27 RX ORDER — METOPROLOL SUCCINATE 100 MG/1
100 TABLET, EXTENDED RELEASE ORAL DAILY
Qty: 90 TABLET | Refills: 3 | Status: SHIPPED | OUTPATIENT
Start: 2022-01-27 | End: 2023-03-24

## 2022-01-27 RX ORDER — SIMVASTATIN 40 MG
40 TABLET ORAL DAILY
Qty: 90 TABLET | Refills: 3 | Status: SHIPPED | OUTPATIENT
Start: 2022-01-27 | End: 2023-02-13

## 2022-01-27 RX ORDER — LOSARTAN POTASSIUM 25 MG/1
TABLET ORAL
Qty: 45 TABLET | Refills: 3 | Status: SHIPPED | OUTPATIENT
Start: 2022-01-27 | End: 2023-02-13

## 2022-01-27 RX ORDER — IPRATROPIUM BROMIDE AND ALBUTEROL SULFATE 2.5; .5 MG/3ML; MG/3ML
SOLUTION RESPIRATORY (INHALATION)
Qty: 180 ML | Refills: 4 | Status: SHIPPED | OUTPATIENT
Start: 2022-01-27 | End: 2023-09-21

## 2022-01-27 ASSESSMENT — ANXIETY QUESTIONNAIRES
IF YOU CHECKED OFF ANY PROBLEMS ON THIS QUESTIONNAIRE, HOW DIFFICULT HAVE THESE PROBLEMS MADE IT FOR YOU TO DO YOUR WORK, TAKE CARE OF THINGS AT HOME, OR GET ALONG WITH OTHER PEOPLE: NOT DIFFICULT AT ALL
3. WORRYING TOO MUCH ABOUT DIFFERENT THINGS: NOT AT ALL
6. BECOMING EASILY ANNOYED OR IRRITABLE: NOT AT ALL
7. FEELING AFRAID AS IF SOMETHING AWFUL MIGHT HAPPEN: NOT AT ALL
5. BEING SO RESTLESS THAT IT IS HARD TO SIT STILL: NOT AT ALL
1. FEELING NERVOUS, ANXIOUS, OR ON EDGE: NOT AT ALL
GAD7 TOTAL SCORE: 1
2. NOT BEING ABLE TO STOP OR CONTROL WORRYING: SEVERAL DAYS

## 2022-01-27 ASSESSMENT — PAIN SCALES - GENERAL: PAINLEVEL: NO PAIN (0)

## 2022-01-27 ASSESSMENT — PATIENT HEALTH QUESTIONNAIRE - PHQ9
SUM OF ALL RESPONSES TO PHQ QUESTIONS 1-9: 4
5. POOR APPETITE OR OVEREATING: NOT AT ALL

## 2022-01-27 ASSESSMENT — MIFFLIN-ST. JEOR: SCORE: 1663.63

## 2022-01-27 ASSESSMENT — ASTHMA QUESTIONNAIRES: ACT_TOTALSCORE: 25

## 2022-01-27 NOTE — PROGRESS NOTES
"  Assessment & Plan     Encounter to establish care  Chart reviewed    Mild major depression in remisison (H)  PHQ-9 is 0. Well controlled on sertraline.  Denies suicidal ideation    - sertraline (ZOLOFT) 50 MG tablet  Dispense: 90 tablet; Refill: 3    Morbid obesity (H)  Discussed dietary and lifestyle changes    SYLVIA (generalized anxiety disorder)  SYLVIA 7 is 1.  Well controlled. Discussed good self care, sleep hygiene, mindfulness therapies and meditation.  - sertraline (ZOLOFT) 50 MG tablet  Dispense: 90 tablet; Refill: 3    Mild intermittent asthma, uncomplicated  ACT 25.  AAP completed.  Well controlled.    - ipratropium - albuterol 0.5 mg/2.5 mg/3 mL (DUONEB) 0.5-2.5 (3) MG/3ML neb solution  Dispense: 180 mL; Refill: 4  - albuterol (PROAIR HFA/PROVENTIL HFA/VENTOLIN HFA) 108 (90 Base) MCG/ACT inhaler  Dispense: 18 g; Refill: 1    Benign essential hypertension  Well controlled on losartan.  Check labs  - losartan (COZAAR) 25 MG tablet  Dispense: 45 tablet; Refill: 3    Hyperlipidemia LDL goal <100  Check labs.  No side effects on statin  - simvastatin (ZOCOR) 40 MG tablet  Dispense: 90 tablet; Refill: 3  - Lipid panel reflex to direct LDL Fasting  - Lipid panel reflex to direct LDL Fasting    Snoring  Stop bang is 4. Referral sent  - SLEEP EVALUATION & MANAGEMENT REFERRAL - ADULT -    Abnormal TSH  Recheck today  - TSH with free T4 reflex  - TSH with free T4 reflex  - T4 free    32 minutes spent on the date of the encounter doing chart review, review of outside records, review of test results, interpretation of tests, patient visit and documentation        BMI:   Estimated body mass index is 41.2 kg/m  as calculated from the following:    Height as of this encounter: 1.626 m (5' 4\").    Weight as of this encounter: 108.9 kg (240 lb).   Weight management plan: Discussed healthy diet and exercise guidelines      Return in about 6 months (around 7/27/2022) for Physical Exam.    Kandace Rodriguez, NP  [facility]    Stop bang 4  ACT 25    Ivanna Mcclellan is a 56 year old who presents for the following health issues     HPI     Establish care, Meet and greet. Refill on medications.  Fasting today for lab.     Hyperlipidemia Follow-Up      Are you regularly taking any medication or supplement to lower your cholesterol?   Yes- .    Are you having muscle aches or other side effects that you think could be caused by your cholesterol lowering medication?  No    Hypertension Follow-up      Do you check your blood pressure regularly outside of the clinic? No     Are you following a low salt diet? Yes    Are your blood pressures ever more than 140 on the top number (systolic) OR more   than 90 on the bottom number (diastolic), for example 140/90? No    Anxiety Follow-Up    How are you doing with your anxiety since your last visit? No change    Are you having other symptoms that might be associated with anxiety? No    Have you had a significant life event? No     Are you feeling depressed? No    Do you have any concerns with your use of alcohol or other drugs? No    Social History     Tobacco Use     Smoking status: Never Smoker     Smokeless tobacco: Never Used   Substance Use Topics     Alcohol use: Yes     Comment: socially     Drug use: No     SYLVIA-7 SCORE 1/25/2016 2/19/2018 3/12/2019   Total Score 3 2 0     PHQ 12/30/2019 2/15/2021 1/27/2022   PHQ-9 Total Score 1 4 4   Q9: Thoughts of better off dead/self-harm past 2 weeks Not at all Not at all Not at all     SYLVIA-7  3/12/2019   1. Feeling nervous, anxious, or on edge 0   2. Not being able to stop or control worrying 0   3. Worrying too much about different things 0   4. Trouble relaxing 0   5. Being so restless that it is hard to sit still 0   6. Becoming easily annoyed or irritable 0   7. Feeling afraid, as if something awful might happen 0   SYLVIA-7 Total Score 0   If you checked any problems, how difficult have they made it for you to do your work,  "take care of things at home, or get along with other people? Not difficult at all       Asthma Follow-Up    Was ACT completed today?    Yes    ACT Total Scores 1/27/2022   ACT TOTAL SCORE -   ASTHMA ER VISITS -   ASTHMA HOSPITALIZATIONS -   ACT TOTAL SCORE (Goal Greater than or Equal to 20) 25   In the past 12 months, how many times did you visit the emergency room for your asthma without being admitted to the hospital? 0   In the past 12 months, how many times were you hospitalized overnight because of your asthma? 0          How many days per week do you miss taking your asthma controller medication?  I do not have an asthma controller medication    Please describe any recent triggers for your asthma: smoke and upper respiratory infections    Have you had any Emergency Room Visits, Urgent Care Visits, or Hospital Admissions since your last office visit?  No        Review of Systems   Constitutional, HEENT, cardiovascular, pulmonary, GI, , musculoskeletal, neuro, skin, endocrine and psych systems are negative, except as otherwise noted.      Objective    /72   Pulse 69   Temp 98.9  F (37.2  C) (Temporal)   Resp 18   Ht 1.626 m (5' 4\")   Wt 108.9 kg (240 lb)   LMP 02/19/2017   SpO2 99%   Breastfeeding No   BMI 41.20 kg/m    Body mass index is 41.2 kg/m .  Physical Exam   GENERAL: healthy, alert and no distress  NECK: no adenopathy, no asymmetry, masses, or scars and thyroid normal to palpation  RESP: lungs clear to auscultation - no rales, rhonchi or wheezes  CV: regular rate and rhythm, normal S1 S2, no S3 or S4, no murmur, click or rub, no peripheral edema and peripheral pulses strong  ABDOMEN: soft, nontender, no hepatosplenomegaly, no masses and bowel sounds normal  MS: no gross musculoskeletal defects noted, no edema            "

## 2022-01-28 ENCOUNTER — MYC MEDICAL ADVICE (OUTPATIENT)
Dept: FAMILY MEDICINE | Facility: CLINIC | Age: 57
End: 2022-01-28
Payer: COMMERCIAL

## 2022-01-28 ASSESSMENT — ANXIETY QUESTIONNAIRES: GAD7 TOTAL SCORE: 1

## 2022-04-09 ENCOUNTER — HEALTH MAINTENANCE LETTER (OUTPATIENT)
Age: 57
End: 2022-04-09

## 2022-10-09 ENCOUNTER — HEALTH MAINTENANCE LETTER (OUTPATIENT)
Age: 57
End: 2022-10-09

## 2023-02-11 DIAGNOSIS — E78.5 HYPERLIPIDEMIA LDL GOAL <100: ICD-10-CM

## 2023-02-11 DIAGNOSIS — I10 BENIGN ESSENTIAL HYPERTENSION: ICD-10-CM

## 2023-02-11 DIAGNOSIS — F33.42 MAJOR DEPRESSIVE DISORDER, RECURRENT EPISODE, IN FULL REMISSION (H): ICD-10-CM

## 2023-02-11 DIAGNOSIS — F41.1 GAD (GENERALIZED ANXIETY DISORDER): ICD-10-CM

## 2023-02-11 DIAGNOSIS — R80.9 MICROALBUMINURIA: ICD-10-CM

## 2023-02-13 RX ORDER — SIMVASTATIN 40 MG
TABLET ORAL
Qty: 90 TABLET | Refills: 0 | Status: SHIPPED | OUTPATIENT
Start: 2023-02-13 | End: 2023-03-24

## 2023-02-13 RX ORDER — LOSARTAN POTASSIUM 25 MG/1
TABLET ORAL
Qty: 45 TABLET | Refills: 0 | Status: SHIPPED | OUTPATIENT
Start: 2023-02-13 | End: 2023-03-24

## 2023-02-13 NOTE — TELEPHONE ENCOUNTER
Pending Prescriptions:                       Disp   Refills    simvastatin (ZOCOR) 40 MG tablet [Pharmac*90 tab*0            Sig: TAKE ONE TABLET BY MOUTH ONCE DAILY    sertraline (ZOLOFT) 50 MG tablet [Pharmac*90 tab*0            Sig: TAKE ONE TABLET BY MOUTH ONCE DAILY    losartan (COZAAR) 25 MG tablet [Pharmacy *45 tab*0            Sig: TAKE ONE-HALF TABLET BY MOUTH DAILY    Medication is being filled for 1 time yaya refill only due to:  Patient is due for physical    Please call and help schedule.  Thank you!    Elyse Rossi RN on 2/13/2023 at 2:37 PM

## 2023-03-18 ASSESSMENT — ASTHMA QUESTIONNAIRES
QUESTION_5 LAST FOUR WEEKS HOW WOULD YOU RATE YOUR ASTHMA CONTROL: WELL CONTROLLED
QUESTION_2 LAST FOUR WEEKS HOW OFTEN HAVE YOU HAD SHORTNESS OF BREATH: ONCE OR TWICE A WEEK
ACT_TOTALSCORE: 22
QUESTION_4 LAST FOUR WEEKS HOW OFTEN HAVE YOU USED YOUR RESCUE INHALER OR NEBULIZER MEDICATION (SUCH AS ALBUTEROL): ONCE A WEEK OR LESS
QUESTION_3 LAST FOUR WEEKS HOW OFTEN DID YOUR ASTHMA SYMPTOMS (WHEEZING, COUGHING, SHORTNESS OF BREATH, CHEST TIGHTNESS OR PAIN) WAKE YOU UP AT NIGHT OR EARLIER THAN USUAL IN THE MORNING: NOT AT ALL
QUESTION_1 LAST FOUR WEEKS HOW MUCH OF THE TIME DID YOUR ASTHMA KEEP YOU FROM GETTING AS MUCH DONE AT WORK, SCHOOL OR AT HOME: NONE OF THE TIME

## 2023-03-18 ASSESSMENT — ENCOUNTER SYMPTOMS
COUGH: 0
MYALGIAS: 0
SORE THROAT: 0
DIARRHEA: 0
ARTHRALGIAS: 0
DIZZINESS: 0
HEADACHES: 0
WEAKNESS: 0
CHILLS: 0
CONSTIPATION: 0
PARESTHESIAS: 0
HEMATOCHEZIA: 0
PALPITATIONS: 0
FEVER: 0
NAUSEA: 0
ABDOMINAL PAIN: 0
NERVOUS/ANXIOUS: 1
DYSURIA: 0
HEARTBURN: 0
BREAST MASS: 0
FREQUENCY: 0
EYE PAIN: 0
JOINT SWELLING: 0
SHORTNESS OF BREATH: 0
HEMATURIA: 0

## 2023-03-21 ENCOUNTER — MYC MEDICAL ADVICE (OUTPATIENT)
Dept: FAMILY MEDICINE | Facility: CLINIC | Age: 58
End: 2023-03-21
Payer: COMMERCIAL

## 2023-03-21 DIAGNOSIS — R79.89 ELEVATED TSH: ICD-10-CM

## 2023-03-21 DIAGNOSIS — E78.5 HYPERLIPIDEMIA LDL GOAL <100: Primary | ICD-10-CM

## 2023-03-21 DIAGNOSIS — R73.01 ABNORMAL FASTING GLUCOSE: ICD-10-CM

## 2023-03-21 DIAGNOSIS — I10 HYPERTENSION GOAL BP (BLOOD PRESSURE) < 140/90: ICD-10-CM

## 2023-03-24 ENCOUNTER — HOSPITAL ENCOUNTER (OUTPATIENT)
Dept: MAMMOGRAPHY | Facility: CLINIC | Age: 58
Discharge: HOME | End: 2023-03-24
Attending: NURSE PRACTITIONER | Admitting: NURSE PRACTITIONER
Payer: COMMERCIAL

## 2023-03-24 ENCOUNTER — OFFICE VISIT (OUTPATIENT)
Dept: FAMILY MEDICINE | Facility: CLINIC | Age: 58
End: 2023-03-24
Payer: COMMERCIAL

## 2023-03-24 VITALS
HEIGHT: 65 IN | RESPIRATION RATE: 18 BRPM | BODY MASS INDEX: 40.48 KG/M2 | SYSTOLIC BLOOD PRESSURE: 154 MMHG | TEMPERATURE: 96.6 F | DIASTOLIC BLOOD PRESSURE: 98 MMHG | WEIGHT: 243 LBS | HEART RATE: 68 BPM | OXYGEN SATURATION: 95 %

## 2023-03-24 DIAGNOSIS — E78.5 HYPERLIPIDEMIA LDL GOAL <100: ICD-10-CM

## 2023-03-24 DIAGNOSIS — I10 BENIGN ESSENTIAL HYPERTENSION: ICD-10-CM

## 2023-03-24 DIAGNOSIS — R80.9 MICROALBUMINURIA: ICD-10-CM

## 2023-03-24 DIAGNOSIS — I10 HYPERTENSION GOAL BP (BLOOD PRESSURE) < 140/90: ICD-10-CM

## 2023-03-24 DIAGNOSIS — R73.01 ABNORMAL FASTING GLUCOSE: ICD-10-CM

## 2023-03-24 DIAGNOSIS — F41.1 GAD (GENERALIZED ANXIETY DISORDER): ICD-10-CM

## 2023-03-24 DIAGNOSIS — E66.01 MORBID OBESITY (H): ICD-10-CM

## 2023-03-24 DIAGNOSIS — Z12.31 VISIT FOR SCREENING MAMMOGRAM: ICD-10-CM

## 2023-03-24 DIAGNOSIS — F33.42 MAJOR DEPRESSIVE DISORDER, RECURRENT EPISODE, IN FULL REMISSION (H): ICD-10-CM

## 2023-03-24 DIAGNOSIS — R79.89 ELEVATED TSH: ICD-10-CM

## 2023-03-24 DIAGNOSIS — Z12.11 SPECIAL SCREENING FOR MALIGNANT NEOPLASMS, COLON: ICD-10-CM

## 2023-03-24 DIAGNOSIS — Z00.00 ROUTINE GENERAL MEDICAL EXAMINATION AT A HEALTH CARE FACILITY: Primary | ICD-10-CM

## 2023-03-24 LAB
ANION GAP SERPL CALCULATED.3IONS-SCNC: 13 MMOL/L (ref 7–15)
BUN SERPL-MCNC: 13.1 MG/DL (ref 6–20)
CALCIUM SERPL-MCNC: 9.4 MG/DL (ref 8.6–10)
CHLORIDE SERPL-SCNC: 104 MMOL/L (ref 98–107)
CHOLEST SERPL-MCNC: 192 MG/DL
CREAT SERPL-MCNC: 0.71 MG/DL (ref 0.51–0.95)
DEPRECATED HCO3 PLAS-SCNC: 25 MMOL/L (ref 22–29)
GFR SERPL CREATININE-BSD FRML MDRD: >90 ML/MIN/1.73M2
GLUCOSE SERPL-MCNC: 118 MG/DL (ref 70–99)
HBA1C MFR BLD: 6 %
HDLC SERPL-MCNC: 48 MG/DL
LDLC SERPL CALC-MCNC: 108 MG/DL
NONHDLC SERPL-MCNC: 144 MG/DL
POTASSIUM SERPL-SCNC: 3.9 MMOL/L (ref 3.4–5.3)
SODIUM SERPL-SCNC: 142 MMOL/L (ref 136–145)
TRIGL SERPL-MCNC: 182 MG/DL
TSH SERPL DL<=0.005 MIU/L-ACNC: 3.44 UIU/ML (ref 0.3–4.2)

## 2023-03-24 PROCEDURE — 80048 BASIC METABOLIC PNL TOTAL CA: CPT | Performed by: NURSE PRACTITIONER

## 2023-03-24 PROCEDURE — 80061 LIPID PANEL: CPT | Performed by: NURSE PRACTITIONER

## 2023-03-24 PROCEDURE — 99396 PREV VISIT EST AGE 40-64: CPT | Performed by: NURSE PRACTITIONER

## 2023-03-24 PROCEDURE — 96127 BRIEF EMOTIONAL/BEHAV ASSMT: CPT | Performed by: NURSE PRACTITIONER

## 2023-03-24 PROCEDURE — 99214 OFFICE O/P EST MOD 30 MIN: CPT | Mod: 25 | Performed by: NURSE PRACTITIONER

## 2023-03-24 PROCEDURE — 84443 ASSAY THYROID STIM HORMONE: CPT | Performed by: NURSE PRACTITIONER

## 2023-03-24 PROCEDURE — 77067 SCR MAMMO BI INCL CAD: CPT

## 2023-03-24 PROCEDURE — 83036 HEMOGLOBIN GLYCOSYLATED A1C: CPT | Performed by: NURSE PRACTITIONER

## 2023-03-24 PROCEDURE — 36415 COLL VENOUS BLD VENIPUNCTURE: CPT | Performed by: NURSE PRACTITIONER

## 2023-03-24 RX ORDER — SIMVASTATIN 40 MG
40 TABLET ORAL DAILY
Qty: 90 TABLET | Refills: 3 | Status: SHIPPED | OUTPATIENT
Start: 2023-03-24 | End: 2024-05-06

## 2023-03-24 RX ORDER — LOSARTAN POTASSIUM 25 MG/1
TABLET ORAL
Qty: 45 TABLET | Refills: 3 | Status: SHIPPED | OUTPATIENT
Start: 2023-03-24 | End: 2024-05-06

## 2023-03-24 RX ORDER — METOPROLOL SUCCINATE 100 MG/1
100 TABLET, EXTENDED RELEASE ORAL DAILY
Qty: 90 TABLET | Refills: 3 | Status: SHIPPED | OUTPATIENT
Start: 2023-03-24 | End: 2024-05-06

## 2023-03-24 ASSESSMENT — ENCOUNTER SYMPTOMS
JOINT SWELLING: 0
FEVER: 0
NERVOUS/ANXIOUS: 1
ARTHRALGIAS: 0
HEMATURIA: 0
DYSURIA: 0
PARESTHESIAS: 0
WEAKNESS: 0
CHILLS: 0
SHORTNESS OF BREATH: 0
SORE THROAT: 0
HEMATOCHEZIA: 0
HEARTBURN: 0
MYALGIAS: 0
HEADACHES: 0
BREAST MASS: 0
ABDOMINAL PAIN: 0
PALPITATIONS: 0
CONSTIPATION: 0
FREQUENCY: 0
DIZZINESS: 0
DIARRHEA: 0
COUGH: 0
EYE PAIN: 0
NAUSEA: 0

## 2023-03-24 ASSESSMENT — PATIENT HEALTH QUESTIONNAIRE - PHQ9
10. IF YOU CHECKED OFF ANY PROBLEMS, HOW DIFFICULT HAVE THESE PROBLEMS MADE IT FOR YOU TO DO YOUR WORK, TAKE CARE OF THINGS AT HOME, OR GET ALONG WITH OTHER PEOPLE: NOT DIFFICULT AT ALL
SUM OF ALL RESPONSES TO PHQ QUESTIONS 1-9: 2
SUM OF ALL RESPONSES TO PHQ QUESTIONS 1-9: 2

## 2023-03-24 ASSESSMENT — PAIN SCALES - GENERAL: PAINLEVEL: NO PAIN (0)

## 2023-03-24 ASSESSMENT — ASTHMA QUESTIONNAIRES: ACT_TOTALSCORE: 22

## 2023-03-24 NOTE — LETTER
My Asthma Action Plan    Name: Vy Villalobos   YOB: 1965  Date: 3/24/2023   My doctor: Kandace Rodriguez NP   My clinic: [facility]        My Rescue Medicine:   Albuterol inhaler (Proair/Ventolin/Proventil HFA)  2-4 puffs EVERY 4 HOURS as needed. Use a spacer if recommended by your provider.   My Asthma Severity:   Intermittent / Exercise Induced  Know your asthma triggers: upper respiratory infections  smoke  upper respiratory infections          GREEN ZONE   Good Control    I feel good    No cough or wheeze    Can work, sleep and play without asthma symptoms       Take your asthma control medicine every day.     1. If exercise triggers your asthma, take your rescue medication    15 minutes before exercise or sports, and    During exercise if you have asthma symptoms  2. Spacer to use with inhaler: If you have a spacer, make sure to use it with your inhaler             YELLOW ZONE Getting Worse  I have ANY of these:    I do not feel good    Cough or wheeze    Chest feels tight    Wake up at night   1. Keep taking your Green Zone medications  2. Start taking your rescue medicine:    every 20 minutes for up to 1 hour. Then every 4 hours for 24-48 hours.  3. If you stay in the Yellow Zone for more than 12-24 hours, contact your doctor.  4. If you do not return to the Green Zone in 12-24 hours or you get worse, start taking your oral steroid medicine if prescribed by your provider.           RED ZONE Medical Alert - Get Help  I have ANY of these:    I feel awful    Medicine is not helping    Breathing getting harder    Trouble walking or talking    Nose opens wide to breathe       1. Take your rescue medicine NOW  2. If your provider has prescribed an oral steroid medicine, start taking it NOW  3. Call your doctor NOW  4. If you are still in the Red Zone after 20 minutes and you have not reached your doctor:    Take your rescue medicine again and    Call 911 or go to the  emergency room right away    See your regular doctor within 2 weeks of an Emergency Room or Urgent Care visit for follow-up treatment.          Annual Reminders:  Meet with Asthma Educator,  Flu Shot in the Fall, consider Pneumonia Vaccination for patients with asthma (aged 19 and older).    Pharmacy:    [facility] [place]  [id number] - [place], [place] - [address] [id number] - [place], [place] - [address] [facility] AT [facility] & [address]  [id number] - [place], [place] - [address]    Electronically signed by Kandace Rodriguez NP   Date: 03/24/23                    Asthma Triggers  How To Control Things That Make Your Asthma Worse    Triggers are things that make your asthma worse.  Look at the list below to help you find your triggers and   what you can do about them. You can help prevent asthma flare-ups by staying away from your triggers.      Trigger                                                          What you can do   Cigarette Smoke  Tobacco smoke can make asthma worse. Do not allow smoking in your home, car or around you.  Be sure no one smokes at a child s day care or school.  If you smoke, ask your health care provider for ways to help you quit.  Ask family members to quit too.  Ask your health care provider for a referral to Quit Plan to help you quit smoking, or call 3-844-312-PLAN.     Colds, Flu, Bronchitis  These are common triggers of asthma. Wash your hands often.  Don t touch your eyes, nose or mouth.  Get a flu shot every year.     Dust Mites  These are tiny bugs that live in cloth or carpet. They are too small to see. Wash sheets and blankets in hot water every week.   Encase pillows and mattress in dust mite proof covers.  Avoid having carpet if you can. If you have carpet, vacuum weekly.   Use a dust mask and HEPA vacuum.   Pollen and Outdoor Mold  Some people are allergic to trees, grass, or weed pollen, or molds. Try to keep your windows closed.  Limit time  out doors when pollen count is high.   Ask you health care provider about taking medicine during allergy season.     Animal Dander  Some people are allergic to skin flakes, urine or saliva from pets with fur or feathers. Keep pets with fur or feathers out of your home.    If you can t keep the pet outdoors, then keep the pet out of your bedroom.  Keep the bedroom door closed.  Keep pets off cloth furniture and away from stuffed toys.     Mice, Rats, and Cockroaches  Some people are allergic to the waste from these pests.   Cover food and garbage.  Clean up spills and food crumbs.  Store grease in the refrigerator.   Keep food out of the bedroom.   Indoor Mold  This can be a trigger if your home has high moisture. Fix leaking faucets, pipes, or other sources of water.   Clean moldy surfaces.  Dehumidify basement if it is damp and smelly.   Smoke, Strong Odors, and Sprays  These can reduce air quality. Stay away from strong odors and sprays, such as perfume, powder, hair spray, paints, smoke incense, paint, cleaning products, candles and new carpet.   Exercise or Sports  Some people with asthma have this trigger. Be active!  Ask your doctor about taking medicine before sports or exercise to prevent symptoms.    Warm up for 5-10 minutes before and after sports or exercise.     Other Triggers of Asthma  Cold air:  Cover your nose and mouth with a scarf.  Sometimes laughing or crying can be a trigger.  Some medicines and food can trigger asthma.

## 2023-03-24 NOTE — PROGRESS NOTES
SUBJECTIVE:   CC: Vy is an 57 year old who presents for preventive health visit.   No flowsheet data found.  Patient has been advised of split billing requirements and indicates understanding: Yes  Healthy Habits:     Getting at least 3 servings of Calcium per day:  NO    Bi-annual eye exam:  Yes    Dental care twice a year:  Yes    Sleep apnea or symptoms of sleep apnea:  Daytime drowsiness    Diet:  Other    Frequency of exercise:  None    Taking medications regularly:  Yes    Medication side effects:  Not applicable    PHQ-2 Total Score: 0    Additional concerns today:  Yes      Depr/ anx- has had an increase in recent stressors.  Gets very nervous to come to clinic    Checks bp at home and is in good range    Today's PHQ-2 Score:   PHQ-2 ( 1999 Pfizer) 3/18/2023   Q1: Little interest or pleasure in doing things 0   Q2: Feeling down, depressed or hopeless 0   PHQ-2 Score 0   PHQ-2 Total Score (12-17 Years)- Positive if 3 or more points; Administer PHQ-A if positive -   Q1: Little interest or pleasure in doing things Not at all   Q2: Feeling down, depressed or hopeless Not at all   PHQ-2 Score 0           Social History     Tobacco Use     Smoking status: Never     Smokeless tobacco: Never   Substance Use Topics     Alcohol use: Yes     Comment: socially         Alcohol Use 3/18/2023   Prescreen: >3 drinks/day or >7 drinks/week? Not Applicable   No flowsheet data found.  Reviewed orders with patient.  Reviewed health maintenance and updated orders accordingly - Yes  Lab work is in process  Labs reviewed in EPIC    Breast Cancer Screening:    FHS-7:   Breast CA Risk Assessment (FHS-7) 3/18/2023   Did any of your first-degree relatives have breast or ovarian cancer? Yes   Did any of your relatives have bilateral breast cancer? Unknown   Did any man in your family have breast cancer? No   Did any woman in your family have breast and ovarian cancer? Unknown   Did any woman in your family have breast cancer before  age 50 y? Yes   Do you have 2 or more relatives with breast and/or ovarian cancer? Unknown   Do you have 2 or more relatives with breast and/or bowel cancer? Unknown     click delete button to remove this line now  Mammogram Screening: Recommended mammography every 1-2 years with patient discussion and risk factor consideration  Pertinent mammograms are reviewed under the imaging tab.    History of abnormal Pap smear: NO - age 30-65 PAP every 5 years with negative HPV co-testing recommended  PAP / HPV Latest Ref Rng & Units 12/30/2019 12/28/2015 10/22/2012   PAP (Historical) - NIL NIL NIL   HPV16 NEG:Negative Negative - -   HPV18 NEG:Negative Negative - -   HRHPV NEG:Negative Negative - -     Reviewed and updated as needed this visit by clinical staff    Allergies  Meds              Reviewed and updated as needed this visit by Provider                 Past Medical History:   Diagnosis Date     Cardiomegaly 11/7/2007    Ventricular - see echo 10/07     Cardiomegaly 11/7/2007    Ventricular - see echo 10/07     Microalbuminuria 02/20/2018     Microalbuminuria 2/20/2018     Mild intermittent asthma     albuterol prn, prednisone as needed.     MITRAL VALVE DISORDER 11/7/2007    Regurg - mild - see echo 10/07      Past Surgical History:   Procedure Laterality Date     HC HYSTEROSCOPY W ENDOMETRIAL BX/POLYPECTOMY W/WO D&C  10/26/2000    Hysteroscopy, dilation and curettage, and diagnostic laparoscopy     HC REMOVAL OF OVARIAN CYST(S)      laparascopic ovarian cystectomy.     HYSTEROSCOPY  11/30/2007    Hysteroscopy, D&C and endometrial ablation.     RELEASE TRIGGER FINGER Left 7/26/2019    Procedure: left ring trigger finger release;  Surgeon: Rodger Hawkins DO;  Location: [facility]     TUBAL LIGATION      1990       Review of Systems   Constitutional: Negative for chills and fever.   HENT: Negative for congestion, ear pain, hearing loss and sore throat.    Eyes: Negative for pain and visual disturbance.  "  Respiratory: Negative for cough and shortness of breath.    Cardiovascular: Negative for chest pain, palpitations and peripheral edema.   Gastrointestinal: Negative for abdominal pain, constipation, diarrhea, heartburn, hematochezia and nausea.   Breasts:  Negative for tenderness, breast mass and discharge.   Genitourinary: Negative for dysuria, frequency, genital sores, hematuria, pelvic pain, urgency, vaginal bleeding and vaginal discharge.   Musculoskeletal: Negative for arthralgias, joint swelling and myalgias.   Skin: Negative for rash.   Neurological: Negative for dizziness, weakness, headaches and paresthesias.   Psychiatric/Behavioral: Negative for mood changes. The patient is nervous/anxious.         OBJECTIVE:   BP (!) 154/98   Pulse 68   Temp (!) 96.6  F (35.9  C) (Temporal)   Resp 18   Ht 1.661 m (5' 5.4\")   Wt 110.2 kg (243 lb)   LMP 02/19/2017   SpO2 95%   BMI 39.94 kg/m    Physical Exam  GENERAL: healthy, alert and no distress  EYES: Eyes grossly normal to inspection, PERRL and conjunctivae and sclerae normal  HENT: ear canals and TM's normal, nose and mouth without ulcers or lesions  NECK: no adenopathy, no asymmetry, masses, or scars and thyroid normal to palpation  RESP: lungs clear to auscultation - no rales, rhonchi or wheezes  CV: regular rate and rhythm, normal S1 S2, no S3 or S4, no murmur, click or rub, no peripheral edema and peripheral pulses strong  ABDOMEN: soft, nontender, no hepatosplenomegaly, no masses and bowel sounds normal  MS: no gross musculoskeletal defects noted, no edema  SKIN: no suspicious lesions or rashes  NEURO: Normal strength and tone, mentation intact and speech normal  PSYCH: mentation appears normal, affect normal/bright    Diagnostic Test Results:  Labs reviewed in Epic  Results for orders placed or performed in visit on 03/24/23 (from the past 24 hour(s))   Lipid panel reflex to direct LDL Fasting   Result Value Ref Range    Cholesterol 192 <200 mg/dL    " Triglycerides 182 (H) <150 mg/dL    Direct Measure HDL 48 (L) >=50 mg/dL    LDL Cholesterol Calculated 108 (H) <=100 mg/dL    Non HDL Cholesterol 144 (H) <130 mg/dL    Narrative    Cholesterol  Desirable:  <200 mg/dL    Triglycerides  Normal:  Less than 150 mg/dL  Borderline High:  150-199 mg/dL  High:  200-499 mg/dL  Very High:  Greater than or equal to 500 mg/dL    Direct Measure HDL  Female:  Greater than or equal to 50 mg/dL   Male:  Greater than or equal to 40 mg/dL    LDL Cholesterol  Desirable:  <100mg/dL  Above Desirable:  100-129 mg/dL   Borderline High:  130-159 mg/dL   High:  160-189 mg/dL   Very High:  >= 190 mg/dL    Non HDL Cholesterol  Desirable:  130 mg/dL  Above Desirable:  130-159 mg/dL  Borderline High:  160-189 mg/dL  High:  190-219 mg/dL  Very High:  Greater than or equal to 220 mg/dL   Basic metabolic panel  (Ca, Cl, CO2, Creat, Gluc, K, Na, BUN)   Result Value Ref Range    Sodium 142 136 - 145 mmol/L    Potassium 3.9 3.4 - 5.3 mmol/L    Chloride 104 98 - 107 mmol/L    Carbon Dioxide (CO2) 25 22 - 29 mmol/L    Anion Gap 13 7 - 15 mmol/L    Urea Nitrogen 13.1 6.0 - 20.0 mg/dL    Creatinine 0.71 0.51 - 0.95 mg/dL    Calcium 9.4 8.6 - 10.0 mg/dL    Glucose 118 (H) 70 - 99 mg/dL    GFR Estimate >90 >60 mL/min/1.73m2   TSH with free T4 reflex   Result Value Ref Range    TSH 3.44 0.30 - 4.20 uIU/mL   Hemoglobin A1c   Result Value Ref Range    Hemoglobin A1C 6.0 (H) <5.7 %       ASSESSMENT/PLAN:   (Z00.00) Routine general medical examination at a health care facility  (primary encounter diagnosis)  Comment: recommend yearly physicals    (F33.42) Major depressive disorder, recurrent episode, in full remission (H)  Comment: PHQ-9 is 3 today.  She does want to increase her sertraline just a little so we will go to 75mg.  Discussed good self care, sleep hygiene, mindfulness therapies and meditation.  Plan: sertraline (ZOLOFT) 50 MG tablet, DISCONTINUED:        sertraline (ZOLOFT) 50 MG tablet    (F41.1)  "SYLVIA (generalized anxiety disorder)  Comment: increase sertraline as above  Plan: [place] BEHAV ASSMT W/SCORE & DOCD/STAND INSTRUMENT,        sertraline (ZOLOFT) 50 MG tablet, DISCONTINUED:        sertraline (ZOLOFT) 50 MG tablet    (E66.01) Morbid obesity (H)  Comment: recommend dietary and lifestyle changes    (E78.5) Hyperlipidemia LDL goal <100  Comment: check labs- on statin  Plan: simvastatin (ZOCOR) 40 MG tablet    (I10) Hypertension goal BP (blood pressure) < 140/90  Comment: good control at home per patient.  She will monitor and let us know  Plan: metoprolol succinate ER (TOPROL XL) 100 MG 24         hr tablet    (R79.89) Elevated TSH  Comment: recheck today    (R73.01) Abnormal fasting glucose  Comment: pre diabetes with Hgb A1C at 6.0    (R80.9) Microalbuminuria  Comment: check labs  Plan: losartan (COZAAR) 25 MG tablet    (Z12.11) Special screening for malignant neoplasms, colon  Comment: prefers FIT  Plan: Fecal colorectal cancer screen (FIT)      Patient has been advised of split billing requirements and indicates understanding: Yes      COUNSELING:  Reviewed preventive health counseling, as reflected in patient instructions      BMI:   Estimated body mass index is 39.94 kg/m  as calculated from the following:    Height as of this encounter: 1.661 m (5' 5.4\").    Weight as of this encounter: 110.2 kg (243 lb).   Weight management plan: Discussed healthy diet and exercise guidelines      She reports that she has never smoked. She has never used smokeless tobacco.          Kandace Rodriguez NP  [facility]  Answers for HPI/ROS submitted by the patient on 3/24/2023  If you checked off any problems, how difficult have these problems made it for you to do your work, take care of things at home, or get along with other people?: Not difficult at all  PHQ9 TOTAL SCORE: 2      "

## 2023-03-29 PROCEDURE — 82274 ASSAY TEST FOR BLOOD FECAL: CPT | Performed by: NURSE PRACTITIONER

## 2023-04-03 LAB — HEMOCCULT STL QL IA: NEGATIVE

## 2023-04-28 NOTE — RESULT ENCOUNTER NOTE
Pt notified of labs results, via [employer] with providers comments.  Ambar Lee, CMA     Second attempt to reach [facility]  Spoke to the RN, who stated I needed to speak to Johnny or Ilsa Norman who are the CM taking care of patients on this service  LVM with name office and callback number on the [place] [id number] service directors voicemail  Asked to please callback today to discuss need for hospice consult  Staff message sent to Dr Cristy Armstrong (pulm) and Dr Farooq Villatoro (Rad) regarding decision of hospice

## 2023-06-08 ENCOUNTER — TELEPHONE (OUTPATIENT)
Dept: DERMATOLOGY | Facility: CLINIC | Age: 58
End: 2023-06-08

## 2023-06-08 ENCOUNTER — OFFICE VISIT (OUTPATIENT)
Dept: DERMATOLOGY | Facility: CLINIC | Age: 58
End: 2023-06-08
Payer: COMMERCIAL

## 2023-06-08 DIAGNOSIS — M79.89 LEG SWELLING: Primary | ICD-10-CM

## 2023-06-08 DIAGNOSIS — Q82.5 PORT WINE STAIN: ICD-10-CM

## 2023-06-08 PROCEDURE — 99204 OFFICE O/P NEW MOD 45 MIN: CPT | Performed by: PHYSICIAN ASSISTANT

## 2023-06-08 ASSESSMENT — PAIN SCALES - GENERAL: PAINLEVEL: NO PAIN (0)

## 2023-06-08 NOTE — LETTER
6/8/2023         RE: Vy Villalobos  [address] [address]        Dear Colleague,    Thank you for referring your patient, Vy Villalobos, to the [facility]. Please see a copy of my visit note below.    HPI:   Chief complaints: Vy Villalobos is a pleasant 57 year old female who presents for evaluation of swelling of the left thigh. 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 She has noticed swelling around the melany for the past 2-3 years. She does not have any pain but it is swollen enough that her pants no longer fit on that side. No swelling of her ankles. No other issues of concern today.       PHYSICAL EXAM:    LMP 02/19/2017   Skin exam performed as follows: Type 2 skin. Mood appropriate  Alert and Oriented X 3. Well developed, well nourished in no distress.  General appearance: Normal  Head including face: Normal  Eyes: conjunctiva and lids: Normal  Mouth: Lips, teeth, gums: Normal  Neck: Normal  Skin: Scalp and body hair: See below    Large pink blanching plaque on the left thigh with swelling; more pronounced with standing.     ASSESSMENT/PLAN:     1. Port wine stain - will US to determine if more significant varicosities and refer to vascular if so. Discussed case with dr Parker as well.   --US ordered          Follow-up: PRN  CC:   Scribed By: Carrol Alvarado, MS, PAAARON          Again, thank you for allowing me to participate in the care of your patient.        Sincerely,        Carrol Alvarado PA-C

## 2023-06-08 NOTE — PROGRESS NOTES
HPI:   Chief complaints: Vy Villalobos is a pleasant 57 year old female who presents for evaluation of swelling of the left thigh. 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 She has noticed swelling around the melany for the past 2-3 years. She does not have any pain but it is swollen enough that her pants no longer fit on that side. No swelling of her ankles. No other issues of concern today.       PHYSICAL EXAM:    LMP 02/19/2017   Skin exam performed as follows: Type 2 skin. Mood appropriate  Alert and Oriented X 3. Well developed, well nourished in no distress.  General appearance: Normal  Head including face: Normal  Eyes: conjunctiva and lids: Normal  Mouth: Lips, teeth, gums: Normal  Neck: Normal  Skin: Scalp and body hair: See below    Large pink blanching plaque on the left thigh with swelling; more pronounced with standing.     ASSESSMENT/PLAN:     1. Port wine stain - will US to determine if more significant varicosities and refer to vascular if so. Discussed case with dr Parker as well.   --US ordered          Follow-up: PRN  CC:   Scribed By: Carrol Alvarado, MS, PA-C

## 2023-06-08 NOTE — NURSING NOTE
Vy Villalobos's chief complaint for this visit includes:  Chief Complaint   Patient presents with     Derm Problem     Patient is here for left thigh birth melany that is getting bigger in size.      PCP: Kandace Rodriguez    Referring Provider:  Referred Self, MD  No address on file    LMP 02/19/2017   No Pain (0)        Allergies   Allergen Reactions     Doxycycline Itching     Miconazole Rash         Do you need any medication refills at today's visit? No    Agnes Canchola MA

## 2023-06-12 NOTE — TELEPHONE ENCOUNTER
[facility] Call Center    Phone Message    May a detailed message be left on voicemail: yes     Reason for Call: Pt is returning a call to discuss her 06/08 Appt discussing Ultrasound. Please call pt back. Thanks       Action Taken: Message routed to:  Other: [place] [facility]    Travel Screening: Not Applicable

## 2023-06-12 NOTE — TELEPHONE ENCOUNTER
Spoke to patient who stated she was returning a call, but I don't see anyone called her?  I asked if Carrol left her the message or if it was a Derm staff member that left her the message? She said Derm staff member.     She asked if she was supposed to have an US done? Per 6-8-23 dictation:     ASSESSMENT/PLAN:      1. Port wine stain - will US to determine if more significant varicosities and refer to vascular if so. Discussed case with dr Parker as well.   --US ordered      I did give her number to schedule [phone].     Kristen Perla RN

## 2023-06-15 ENCOUNTER — TELEPHONE (OUTPATIENT)
Dept: DERMATOLOGY | Facility: CLINIC | Age: 58
End: 2023-06-15
Payer: COMMERCIAL

## 2023-06-15 ENCOUNTER — HOSPITAL ENCOUNTER (OUTPATIENT)
Dept: ULTRASOUND IMAGING | Facility: CLINIC | Age: 58
Discharge: HOME | End: 2023-06-15
Attending: PHYSICIAN ASSISTANT | Admitting: PHYSICIAN ASSISTANT
Payer: COMMERCIAL

## 2023-06-15 DIAGNOSIS — M79.89 LEG SWELLING: ICD-10-CM

## 2023-06-15 DIAGNOSIS — I83.812 VARICOSE VEINS OF LEG WITH PAIN, LEFT: Primary | ICD-10-CM

## 2023-06-15 PROCEDURE — 93971 EXTREMITY STUDY: CPT | Mod: LT

## 2023-06-15 NOTE — TELEPHONE ENCOUNTER
Patient Contact    Attempt # 1    Was call answered?  Yes Patient was notified of results.     Will call back with additional questions or concerns.     Agnes Canchola MA    [facility]   [phone]

## 2023-06-15 NOTE — TELEPHONE ENCOUNTER
----- Message from Carrol Alvarado PA-C sent at 6/15/2023  3:05 PM CDT -----  US showed incompetent veins with reflux which could be causing the swelling. I'd like her to follow-up with vascular surgery for this. Referral placed

## 2023-06-29 ENCOUNTER — ANCILLARY PROCEDURE (OUTPATIENT)
Dept: ULTRASOUND IMAGING | Facility: CLINIC | Age: 58
End: 2023-06-29
Attending: SURGERY
Payer: COMMERCIAL

## 2023-06-29 ENCOUNTER — OFFICE VISIT (OUTPATIENT)
Dept: VASCULAR SURGERY | Facility: CLINIC | Age: 58
End: 2023-06-29
Attending: PHYSICIAN ASSISTANT
Payer: COMMERCIAL

## 2023-06-29 DIAGNOSIS — I83.812 VARICOSE VEINS OF LEG WITH PAIN, LEFT: ICD-10-CM

## 2023-06-29 DIAGNOSIS — Q82.5 PORT-WINE STAIN OF SKIN: Primary | ICD-10-CM

## 2023-06-29 PROCEDURE — 93971 EXTREMITY STUDY: CPT | Mod: LT | Performed by: SURGERY

## 2023-06-29 PROCEDURE — 99203 OFFICE O/P NEW LOW 30 MIN: CPT | Performed by: SURGERY

## 2023-06-29 NOTE — LETTER
6/29/2023         RE: Vy Villalobos  [address] [facility] [id number]        Dear Colleague,    Thank you for referring your patient, Vy Villalobos, to the [facility] [place]. Please see a copy of my visit note below.    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  The greatest concern is of swelling of her left thigh that is occurred over the last year or so.  She does not describe pain in the thigh or leg.  She has not noted significant swelling in the left calf but primarily in the left thigh.  She has had no complications of hemorrhage, superficial phlebitis or deep vein thrombosis.  Her specific question is what is causing the swelling around the birthmark of the left thigh.    She has not worn compression hose to any significant extent because they do not fit well, tending to roll down her thigh.  She admits to discrepancy in the size of the thighs with the left side being larger than the right.  She is not sure of any significant limb length discrepancy.    Her family history is negative for varicose veins or port wine stains.  No family history of venous thromboembolism to her knowledge.    PAST MEDICAL HISTORY:   Past Medical History:   Diagnosis Date     Cardiomegaly 11/07/2007    Ventricular - see echo 10/07     Cardiomegaly 11/07/2007    Ventricular - see echo 10/07     Microalbuminuria 02/20/2018     Microalbuminuria 02/20/2018     Mild intermittent asthma     albuterol prn, prednisone as needed.     MITRAL VALVE DISORDER 11/07/2007    Regurg - mild - see echo 10/07       PAST SURGICAL HISTORY:   Past Surgical History:   Procedure Laterality Date     [facility] HYSTEROSCOPY W ENDOMETRIAL BX/POLYPECTOMY W/WO D&C  10/26/2000    Hysteroscopy, dilation and curettage, and diagnostic laparoscopy     [facility] REMOVAL OF OVARIAN CYST(S)      laparascopic  ovarian cystectomy.     HYSTEROSCOPY  2007    Hysteroscopy, D&C and endometrial ablation.     RELEASE TRIGGER FINGER Left 2019    Procedure: left ring trigger finger release;  Surgeon: Rodger Hawkins DO;  Location: [facility]     TUBAL LIGATION             FAMILY HISTORY:   Family History   Problem Relation Age of Onset     Hypertension Mother      Arthritis Mother      Eye Disorder Mother         glaucoma     Lipids Mother      Hypertension Father      Eye Disorder Father         glaucoma     Lipids Father      Respiratory Father         asthma     Breast Cancer Maternal Aunt          in her 40's     Alzheimer Disease Paternal Aunt         dx at 69     Eye Disorder Maternal Grandmother         glaucoma       SOCIAL HISTORY:   Social History     Tobacco Use     Smoking status: Never     Smokeless tobacco: Never   Substance Use Topics     Alcohol use: Yes     Comment: socially       REVIEW OF SYSTEMS: Review Of Systems  Skin: negative, port wine stain left thigh  Eyes: negative  Ears/Nose/Throat: negative  Respiratory: No shortness of breath, dyspnea on exertion, cough, or hemoptysis  Cardiovascular: negative  Gastrointestinal: negative  Genitourinary: negative  Musculoskeletal: Back pain, neck pain, leg swelling  Neurologic: headaches  Psychiatric: Depression, anxiety  Hematologic/Lymphatic/Immunologic: Easy bruising  Endocrine: negative      Vital signs:  LMP 2017     Current Outpatient Medications   Medication Sig Dispense Refill     albuterol (PROAIR HFA/PROVENTIL HFA/VENTOLIN HFA) 108 (90 Base) MCG/ACT inhaler Inhale 2 puffs into the lungs every 6 hours as needed for shortness of breath / dyspnea 18 g 1     CALCIUM-VITAMIN D PO Take  by mouth. daily       ipratropium - albuterol 0.5 mg/2.5 mg/3 mL (DUONEB) 0.5-2.5 (3) MG/3ML neb solution TAKE 1 VIAL (3 MLS) BY NEBULIZATION EVERY 6 HOURS AS NEEDED FOR SHORTN ESS OF BREATH / DYSPNEA 180 mL 4     losartan (COZAAR) 25 MG tablet TAKE  ONE-HALF TABLET BY MOUTH DAILY 45 tablet 3     metoprolol succinate ER (TOPROL XL) 100 MG 24 hr tablet Take 1 tablet (100 mg) by mouth daily 90 tablet 3     Multiple Vitamins-Minerals (CENTRUM PO) Take  by mouth.       sertraline (ZOLOFT) 50 MG tablet Take 1.5 tablets (75 mg) by mouth daily 90 tablet 3     simvastatin (ZOCOR) 40 MG tablet Take 1 tablet (40 mg) by mouth daily 90 tablet 3     VITAMIN C, CALCIUM ASCORBATE, PO Take  by mouth. daily         PHYSICAL EXAM:  General: Pleasant, NAD.   HEENT: Normocephalic, atraumatic, external ears and nose normal.   Respiratory: Normal respiratory effort.   Cardiovascular: Pulse is regular.   Musculoskeletal: Gait and station normal.  The joints of her fingers and toes without deformity.  There is no cyanosis of her nailbeds.   EXTREMITIES: Right lower extremity: Small vein on the posteromedial right calf.  No varicose veins, port wine stains or stasis changes.  No significant edema.    Left lower extremity: Port wine stain over the left anterior, anteromedial and anterolateral thigh.  The left thigh appears larger than the right with fullness of the tissues near the port wine stain.  No varicose veins are appreciated and the port wine stain.  There are a few reticular veins visible over the anterior and anteromedial portion of the port wine stain.  No palpable veins deep to the area.  No significant tenderness.  No inflammatory skin changes.    No significant varicose veins over the remainder of the left leg.  Trace to 1+ edema of the left calf and ankle.  PULSES: R/L (3=normal pulse, 0=no palpable pulse) dorsalis pedis: 3/3; posterior tibial: 2/2.      Neurologic: Grossly normal  Psychiatric: Mood, affect, judgment and insight are normal     Venous Duplex Ultrasound:   Name:  Vy Villalobos                                                      Patient ID: [id number]  Date: 2023                                                   : 1965  Sex:  female                                                                 Examined by: LILLY Vasquez RVT  Age:  58 year old                                                         Reading MD: TERESSA Wagner MD     INDICATION:  Lt leg swelling with anterior thigh port wine stain     EXAM TYPE  LEFT LOWER EXTREMITY VENOUS DUPLEX FOR VENOUS INSUFFICIENCY TECHNICAL SUMMARY     A duplex ultrasound study using color flow was performed, to evaluate the left lower extremity veins for valvular incompetence with the patient in a steep reversed trendelenberg.      LEFT:     The deep veins demonstrate phasic flow, compress and respond to augmentations.  There is no  DVT.  The common femoral vein is incompetent and free of thrombus. The remaining deep veins are competent and free of thrombus.      The GSV demonstrates phasic flow, compresses and responds to augmentations from the saphenofemoral junction to the ankle with no evidence of thrombus. The great saphenous vein measures 5.1 mm at the saphenofemoral junction, 4.9 mm at the proximal thigh and 3.1 mm at the knee. The GSV is incompetent from SFJ to Proximal Thigh , with the greatest reflux time of 2458 milliseconds.  The GSV courses superficial from the mid thigh to the proximal calf. The GSV is tortuous at the knee. The GSV gives rise to multiple competent branches off the thigh that course deep towards the port wine stain but do not appear to communicate with the surface of the skin.     The AASV is not visualized.      The Giacomini vein is absent.     The SSV demonstrates phasic flow, compresses and responds to augmentations from the popliteal space to the ankle.  No reflux or thrombus is seen. The saphenopopliteal junction is competent ( 2.4 mm).      Perforators: there is no evidence of incompetent  veins at any level.       RIGHT:     The CFV demonstrate phasic flow, compress and respond to augmentations.  There is no DVT.       FINAL SUMMARY:  Left lower  extremity:  1.  No deep vein thrombosis  2.  Left common femoral vein incompetence  3.  Left saphenofemoral junction and proximal thigh great saphenous vein incompetence  4.  Small, competent vein branches coursing from right great saphenous vein deep to port wine stain.  These do not seem to course to the immediate subcutaneous tissues  5.  No incompetent  veins     Right lower extremity:  No right common femoral vein thrombosis or incompetence     Incompetence Criteria: Greater than 500 milliseconds reflux in the superficial and  veins and greater than 1000 milliseconds reflux in the deep veins.     ALIVIA Wagner MD, FACS       ASSESSMENT:  Port wine stain left anterior thigh with slow left thigh enlargement over the last several years.  The patient is not suffering significant pain but is concerned about the enlargement of her thigh.    We discussed the lower extremity vein anatomy and the pathophysiology of venous insufficiency utilizing a leg vein drawing.  We discussed the option of continued conservative measures with risks of enlargement of the port wine stain and her left thigh possible.    Though her ultrasound did not reveal significant saphenous vein incompetence, I would like ultrasound imaging to specifically look at the tissues deep to the port wine stain to see if there are any varicosities deep to the port wine stain that may be creating some venous hypertension.  I would also like to look for any perforating veins that could be pressurizing the area as well.  We will discuss the study via a video visit.    We briefly discussed options for treating  vein incompetence and veins that may be feeding the area of the port wine stain utilizing office-based ultrasound-guided sclerotherapy.    If no significant abnormalities are found on ultrasound, CT or MRI of the thigh may be indicated to ensure that there are no other significant abnormalities.    PLAN:  Left lower  extremity venous ultrasound to look for sources of blood supply to the left thigh port wine stain.    Jose Wagner MD    Dictated using Dragon voice recognition software which may result in transcription errors          [facility] LEG DRAWING:                  Again, thank you for allowing me to participate in the care of your patient.        Sincerely,        Jose Wagner MD

## 2023-06-29 NOTE — NURSING NOTE
Patient Reported symptoms:        Left Leg   Heaviness None of the time   Achiness None of the time   Swelling None of the time   Throbbing None of the time   Itching None of the time   Appearance Not at all noticeable   Impact on work/activities Symptoms but full able to participate *Patient does not have varicose veins or spider veins on left leg. Patient wondering about swelling around birth melany on left thigh. Patient is able to fully participate in work and activities.

## 2023-06-29 NOTE — PROGRESS NOTES
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  The greatest concern is of swelling of her left thigh that is occurred over the last year or so.  She does not describe pain in the thigh or leg.  She has not noted significant swelling in the left calf but primarily in the left thigh.  She has had no complications of hemorrhage, superficial phlebitis or deep vein thrombosis.  Her specific question is what is causing the swelling around the birthmark of the left thigh.    She has not worn compression hose to any significant extent because they do not fit well, tending to roll down her thigh.  She admits to discrepancy in the size of the thighs with the left side being larger than the right.  She is not sure of any significant limb length discrepancy.    Her family history is negative for varicose veins or port wine stains.  No family history of venous thromboembolism to her knowledge.    PAST MEDICAL HISTORY:   Past Medical History:   Diagnosis Date     Cardiomegaly 11/07/2007    Ventricular - see echo 10/07     Cardiomegaly 11/07/2007    Ventricular - see echo 10/07     Microalbuminuria 02/20/2018     Microalbuminuria 02/20/2018     Mild intermittent asthma     albuterol prn, prednisone as needed.     MITRAL VALVE DISORDER 11/07/2007    Regurg - mild - see echo 10/07       PAST SURGICAL HISTORY:   Past Surgical History:   Procedure Laterality Date     [facility] HYSTEROSCOPY W ENDOMETRIAL BX/POLYPECTOMY W/WO D&C  10/26/2000    Hysteroscopy, dilation and curettage, and diagnostic laparoscopy     [facility] REMOVAL OF OVARIAN CYST(S)      laparascopic ovarian cystectomy.     HYSTEROSCOPY  11/30/2007    Hysteroscopy, D&C and endometrial ablation.     RELEASE TRIGGER FINGER Left 7/26/2019    Procedure: left ring trigger finger release;  Surgeon: Rodger Hawkins DO;  Location:  OR      TUBAL LIGATION             FAMILY HISTORY:   Family History   Problem Relation Age of Onset     Hypertension Mother      Arthritis Mother      Eye Disorder Mother         glaucoma     Lipids Mother      Hypertension Father      Eye Disorder Father         glaucoma     Lipids Father      Respiratory Father         asthma     Breast Cancer Maternal Aunt          in her 40's     Alzheimer Disease Paternal Aunt         dx at 69     Eye Disorder Maternal Grandmother         glaucoma       SOCIAL HISTORY:   Social History     Tobacco Use     Smoking status: Never     Smokeless tobacco: Never   Substance Use Topics     Alcohol use: Yes     Comment: socially       REVIEW OF SYSTEMS: Review Of Systems  Skin: negative, port wine stain left thigh  Eyes: negative  Ears/Nose/Throat: negative  Respiratory: No shortness of breath, dyspnea on exertion, cough, or hemoptysis  Cardiovascular: negative  Gastrointestinal: negative  Genitourinary: negative  Musculoskeletal: Back pain, neck pain, leg swelling  Neurologic: headaches  Psychiatric: Depression, anxiety  Hematologic/Lymphatic/Immunologic: Easy bruising  Endocrine: negative      Vital signs:  LMP 2017     Current Outpatient Medications   Medication Sig Dispense Refill     albuterol (PROAIR HFA/PROVENTIL HFA/VENTOLIN HFA) 108 (90 Base) MCG/ACT inhaler Inhale 2 puffs into the lungs every 6 hours as needed for shortness of breath / dyspnea 18 g 1     CALCIUM-VITAMIN D PO Take  by mouth. daily       ipratropium - albuterol 0.5 mg/2.5 mg/3 mL (DUONEB) 0.5-2.5 (3) MG/3ML neb solution TAKE 1 VIAL (3 MLS) BY NEBULIZATION EVERY 6 HOURS AS NEEDED FOR SHORTN ESS OF BREATH / DYSPNEA 180 mL 4     losartan (COZAAR) 25 MG tablet TAKE ONE-HALF TABLET BY MOUTH DAILY 45 tablet 3     metoprolol succinate ER (TOPROL XL) 100 MG 24 hr tablet Take 1 tablet (100 mg) by mouth daily 90 tablet 3     Multiple Vitamins-Minerals (CENTRUM PO) Take  by mouth.       sertraline (ZOLOFT) 50 MG  tablet Take 1.5 tablets (75 mg) by mouth daily 90 tablet 3     simvastatin (ZOCOR) 40 MG tablet Take 1 tablet (40 mg) by mouth daily 90 tablet 3     VITAMIN C, CALCIUM ASCORBATE, PO Take  by mouth. daily         PHYSICAL EXAM:  General: Pleasant, NAD.   HEENT: Normocephalic, atraumatic, external ears and nose normal.   Respiratory: Normal respiratory effort.   Cardiovascular: Pulse is regular.   Musculoskeletal: Gait and station normal.  The joints of her fingers and toes without deformity.  There is no cyanosis of her nailbeds.   EXTREMITIES: Right lower extremity: Small vein on the posteromedial right calf.  No varicose veins, port wine stains or stasis changes.  No significant edema.    Left lower extremity: Port wine stain over the left anterior, anteromedial and anterolateral thigh.  The left thigh appears larger than the right with fullness of the tissues near the port wine stain.  No varicose veins are appreciated and the port wine stain.  There are a few reticular veins visible over the anterior and anteromedial portion of the port wine stain.  No palpable veins deep to the area.  No significant tenderness.  No inflammatory skin changes.    No significant varicose veins over the remainder of the left leg.  Trace to 1+ edema of the left calf and ankle.  PULSES: R/L (3=normal pulse, 0=no palpable pulse) dorsalis pedis: 3/3; posterior tibial: 2/2.      Neurologic: Grossly normal  Psychiatric: Mood, affect, judgment and insight are normal     Venous Duplex Ultrasound:   Name:  Vy Villalobos                                                      Patient ID: [id number]  Date: 2023                                                   : 1965  Sex: female                                                                 Examined by: LILLY Vasquez RVT  Age:  58 year old                                                         Reading MD: TERESSA Wagner MD     INDICATION:  Lt leg swelling with anterior thigh  port wine stain     EXAM TYPE  LEFT LOWER EXTREMITY VENOUS DUPLEX FOR VENOUS INSUFFICIENCY TECHNICAL SUMMARY     A duplex ultrasound study using color flow was performed, to evaluate the left lower extremity veins for valvular incompetence with the patient in a steep reversed trendelenberg.      LEFT:     The deep veins demonstrate phasic flow, compress and respond to augmentations.  There is no  DVT.  The common femoral vein is incompetent and free of thrombus. The remaining deep veins are competent and free of thrombus.      The GSV demonstrates phasic flow, compresses and responds to augmentations from the saphenofemoral junction to the ankle with no evidence of thrombus. The great saphenous vein measures 5.1 mm at the saphenofemoral junction, 4.9 mm at the proximal thigh and 3.1 mm at the knee. The GSV is incompetent from SFJ to Proximal Thigh , with the greatest reflux time of 2458 milliseconds.  The GSV courses superficial from the mid thigh to the proximal calf. The GSV is tortuous at the knee. The GSV gives rise to multiple competent branches off the thigh that course deep towards the port wine stain but do not appear to communicate with the surface of the skin.     The AASV is not visualized.      The Giacomini vein is absent.     The SSV demonstrates phasic flow, compresses and responds to augmentations from the popliteal space to the ankle.  No reflux or thrombus is seen. The saphenopopliteal junction is competent ( 2.4 mm).      Perforators: there is no evidence of incompetent  veins at any level.       RIGHT:     The CFV demonstrate phasic flow, compress and respond to augmentations.  There is no DVT.       FINAL SUMMARY:  Left lower extremity:  1.  No deep vein thrombosis  2.  Left common femoral vein incompetence  3.  Left saphenofemoral junction and proximal thigh great saphenous vein incompetence  4.  Small, competent vein branches coursing from right great saphenous vein deep to port wine  stain.  These do not seem to course to the immediate subcutaneous tissues  5.  No incompetent  veins     Right lower extremity:  No right common femoral vein thrombosis or incompetence     Incompetence Criteria: Greater than 500 milliseconds reflux in the superficial and  veins and greater than 1000 milliseconds reflux in the deep veins.     ALIVIA Wagner MD, FACS       ASSESSMENT:  Port wine stain left anterior thigh with slow left thigh enlargement over the last several years.  The patient is not suffering significant pain but is concerned about the enlargement of her thigh.    We discussed the lower extremity vein anatomy and the pathophysiology of venous insufficiency utilizing a leg vein drawing.  We discussed the option of continued conservative measures with risks of enlargement of the port wine stain and her left thigh possible.    Though her ultrasound did not reveal significant saphenous vein incompetence, I would like ultrasound imaging to specifically look at the tissues deep to the port wine stain to see if there are any varicosities deep to the port wine stain that may be creating some venous hypertension.  I would also like to look for any perforating veins that could be pressurizing the area as well.  We will discuss the study via a video visit.    We briefly discussed options for treating  vein incompetence and veins that may be feeding the area of the port wine stain utilizing office-based ultrasound-guided sclerotherapy.    If no significant abnormalities are found on ultrasound, CT or MRI of the thigh may be indicated to ensure that there are no other significant abnormalities.    PLAN:  Left lower extremity venous ultrasound to look for sources of blood supply to the left thigh port wine stain.    Jose Wagner MD    Dictated using Dragon voice recognition software which may result in transcription errors          [facility] CLINIC LEG DRAWING:

## 2023-06-29 NOTE — PATIENT INSTRUCTIONS
Varicose Veins and Spider Veins    Varicose veins are swollen, enlarged veins most often found in the legs. They are usually blue or purple in color and may bulge, twist, and stand out under the skin. Spider veins are small veins just under your skin that can look red, blue or purple.        Normally, veins return blood from the body to the heart. The leg veins have one-way valves that prevent blood from flowing backward in the vein. When the valves are weak or damaged, blood backs up in the veins. This may cause some of the veins to swell and bulge and become varicose veins.     Symptoms  Varicose veins may or may not cause symptoms. If symptoms do occur, they can include:  Legs that feel tired, achy, heavy, or itchy  Leg swelling  Leg muscle cramps  Skin changes, such as discoloration, dryness, redness, or rash (in more severe cases, you may also have sores on the skin called venous leg ulcers)    Risk factors  There are a number of factors that increase the risk for varicose veins. These can include:   Being a woman  Being older  Sitting or standing for long periods  Being overweight  Being pregnant  Having a family history of varicose veins  Hormones, birth control pills    Treatment starts with simple self-help measures (see below). If these don't help, there are many procedures that can be done to shrink or remove varicose veins. Your healthcare provider can tell you more about these options, if needed.     Home care  Support or compression stockings will likely be prescribed. If so, be sure to wear them as directed. They may help improve blood flow.  Exercising helps strengthen your leg muscles and improve blood flow. To get the most benefit, choose exercises such as walking, swimming, or cycling. Also try to exercise for at least 30 minutes on most days.  Raising your legs above heart level will help relieve swelling and keep blood from pooling in veins. Try to elevate your legs for 15 to 20 minutes at  the end of the day, and whenever you're relaxing. To make sure your legs are raised above heart level, prop them up on cushions or large pillows.  To keep blood moving when you have to sit or stand for long periods, try these tips:  At work, take walking breaks instead of coffee breaks. Walk during your lunch hour. Or try flexing your feet up and down 10 times each hour.  When standing, raise yourself up and down on your toes, or rock back and forth on your heels.  If you are overweight, talk with your healthcare provider about setting up a weight-loss plan. Maintaining a healthy weight can help reduce the strain on your veins. It may also improve symptoms, such as swelling and aching.  If you have dryness and itching, ask your provider about special lotions that can be applied to the skin to help improve symptoms.     Follow-up care  Follow up with your healthcare provider, or as directed. If imaging tests were done, you'll be told the results and if there are any new findings that affect your care.     When to seek medical advice  Call your healthcare provider right away if any of these occur:  Sudden, severe leg swelling, pain, or redness  Symptoms worsen, or they don't improve with self-care  Bleeding from any affected veins  Ulcers form on the legs, ankles, or feet  Fever of 100.4 F (38 C) or higher, or as advised by your provider    [employer] last reviewed this educational content on 4/1/2018 2000-2021 [employer]. All rights reserved. This information is not intended as a substitute for professional medical care. Always follow your healthcare professional's instructions.    Self-Care for Spider and Varicose Veins    Your healthcare provider may suggest that you try self-care. Exercising and maintaining a healthy weight may keep problem veins from getting worse. Wearing elastic stockings and elevating your legs can help improve blood flow. Taking breaks when you sit or stand helps,  too.        Wearing compression stockings  Compression stockings gently squeeze veins so blood flows upward. If you need compression stockings, your healthcare provider can prescribe them for you. Follow your healthcare provider's advice about how and when to wear them. Compression stockings come in several different levels of pressure. Ask your healthcare provider which level of pressure would help you the most.     Raising your legs above heart level will help relieve swelling and keep blood from pooling in veins. Try to elevate your legs for 15 to 20 minutes at the end of the day, and whenever you're relaxing. To make sure your legs are raised above heart level, prop them up on cushions or large pillows.    To keep blood moving when you have to sit or stand for long periods, try these tips:  At work, take walking breaks instead of coffee breaks. Walk during your lunch hour. Or try flexing your feet up and down 10 times each hour.  When standing, raise yourself up and down on your toes, or rock back and forth on your heels.    [email or web address] last reviewed this educational content on 11/1/2019 2000-2021 [employer]. All rights reserved. This information is not intended as a substitute for professional medical care. Always follow your healthcare professional's instructions.    Treatment Options    Sclerotherapy  Your healthcare provider will inject the vein with a special chemical that will quickly close the vein from the inside. This is particularly useful for spider veins and smaller varicose veins.      If you have large varicose veins, surgery may be the best choice. But it will not prevent new varicose veins from forming. Surgery is most often done in a surgery center or in the office.    If surgery is recommended for you, your surgery will be tailored to your needs. Varicose veins may be tied off (ligation), destroyed, or removed. Blood will then flow through the healthy veins. One or more of the  following techniques may be used:    Ablation (laser or radiofrequency)  A tiny cut in the skin is made near the varicose vein. A small tube called a catheter is inserted into the vein. Energy or heat released from the catheter tip will make the vein walls collapse and stick together, stopping all blood flow through the vein.         Ablation (glue)  A tiny cut in the skin is made near the varicose vein. A small tube called a catheter is inserted into the vein. Droplets of glue are deposited into the vein to make vein walls collapse and stick together stopping blood flow through the vein.    Microphlebectomy or ambulatory phlebectomy  A special hook is used to gently take out a varicose vein through tiny incisions. Microphlebectomy may be done in your healthcare provider's office.      Vein stripping and ligation (rare)  In more severe cases, the surgeon may tie off and remove veins by making smaller cuts in the skin. Smaller branching veins may also be tied off or removed.    Know about the risks  Your healthcare provider will talk with you about the risks of surgery. These include:  Bleeding or swelling  A sense of numbness, burning, or tingling in areas near the procedure  Edema or swelling in the legs  Clots in the deep veins that may travel to the lungs  Infection  Scarring  Inflammation related to the glue    "[employer] last reviewed this educational content on 11/1/2019 (Sclerotherapy image) 12/1/2019 (Radiofrequency ablation image, Microphlebectomy image)    [id number] [employer]. All rights reserved. This information is not intended as a substitute for professional medical care. Always follow your healthcare professional's instructions.

## 2023-06-30 ENCOUNTER — VIRTUAL VISIT (OUTPATIENT)
Dept: VASCULAR SURGERY | Facility: CLINIC | Age: 58
End: 2023-06-30
Payer: COMMERCIAL

## 2023-06-30 DIAGNOSIS — Q82.5 PORT-WINE STAIN OF SKIN: Primary | ICD-10-CM

## 2023-06-30 PROCEDURE — 99213 OFFICE O/P EST LOW 20 MIN: CPT | Mod: VID | Performed by: SURGERY

## 2023-06-30 NOTE — PROGRESS NOTES
Vy is a 58 year old who is being evaluated via a billable video visit.      How would you like to obtain your AVS? MyChart  If the video visit is dropped, the invitation should be resent by: Text to cell phone: [phone]  Will anyone else be joining your video visit? No    Video-Visit Details    Type of service:  Video Visit     Originating Location (pt. 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  Please see my consultation of 2023 for details.  On today's video visit, we discussed her left lower extremity venous competency study which was obtained to specifically look for any incompetent perforators or large, feeding the veins to the port wine stain.    Physical Exam  General: Pleasant female in no acute distress.  Blood pressure 148/70, pulse 86  Extremities: Left lower extremity: Port wine stain over the left anterior, anteromedial and anterolateral thigh.  The left thigh appears larger than the right with fullness of the tissues near the port wine stain.  No varicose veins are appreciated and the port wine stain.  There are a few reticular veins visible over the anterior and anteromedial portion of the port wine stain.  No palpable veins deep to the area.  No significant tenderness.  No inflammatory skin changes.     No significant varicose veins over the remainder of the left leg.  Trace to 1+ edema of the left calf and ankle.    Ultrasound:  Name:  Vy Villalobos                                                      Patient ID: [id number]  Date: 2023                                                   : 1965  Sex: female                                                                 Examined by: LILLY Vasquez RVT  Age:  58 year  old                                                         Reading MD: TERESSA Wagner MD     INDICATION:  Lt leg swelling with anterior thigh port wine stain     EXAM TYPE  LEFT LOWER EXTREMITY VENOUS DUPLEX FOR VENOUS INSUFFICIENCY TECHNICAL SUMMARY     A duplex ultrasound study using color flow was performed, to evaluate the left lower extremity veins for valvular incompetence with the patient in a steep reversed trendelenberg.      LEFT:     The deep veins demonstrate phasic flow, compress and respond to augmentations.  There is no  DVT.  The common femoral vein is incompetent and free of thrombus. The remaining deep veins are competent and free of thrombus.      The GSV demonstrates phasic flow, compresses and responds to augmentations from the saphenofemoral junction to the ankle with no evidence of thrombus. The great saphenous vein measures 5.1 mm at the saphenofemoral junction, 4.9 mm at the proximal thigh and 3.1 mm at the knee. The GSV is incompetent from SFJ to Proximal Thigh , with the greatest reflux time of 2458 milliseconds.  The GSV courses superficial from the mid thigh to the proximal calf. The GSV is tortuous at the knee. The GSV gives rise to multiple competent branches off the thigh that course deep towards the port wine stain but do not appear to communicate with the surface of the skin.     The AASV is not visualized.      The Giacomini vein is absent.     The SSV demonstrates phasic flow, compresses and responds to augmentations from the popliteal space to the ankle.  No reflux or thrombus is seen. The saphenopopliteal junction is competent ( 2.4 mm).      Perforators: there is no evidence of incompetent  veins at any level.       RIGHT:     The CFV demonstrate phasic flow, compress and respond to augmentations.  There is no DVT.       FINAL SUMMARY:  Left lower extremity:  1.  No deep vein thrombosis  2.  Left common femoral vein incompetence  3.  Left saphenofemoral  junction and proximal thigh great saphenous vein incompetence  4.  Small, competent vein branches coursing from right great saphenous vein deep to port wine stain.  These do not seem to course to the immediate subcutaneous tissues  5.  No incompetent  veins     Right lower extremity:  No right common femoral vein thrombosis or incompetence     Incompetence Criteria: Greater than 500 milliseconds reflux in the superficial and  veins and greater than 1000 milliseconds reflux in the deep veins.     Assessment:  Left thigh port wine stain with swelling of the thigh and, to a lesser extent, the left leg.  Repeat ultrasound did not reveal any feeding incompetent perforators or significant varicosities coursing to the port wine stain.  I discussed the ultrasound with the patient using a leg vein drawing.    I discussed her case with Dr. Ken Hung.  We will obtain a CT venogram of her abdomen and pelvis to ensure that there is no pelvic venous compression or stenosis that could be contributing to the swelling.  I discussed this frankly with the patient.    We will try to schedule this to be done in [place] as the patient lives near this hospital.    Plan:  CT venogram abdomen and pelvis to ensure there is no iliac vein compression or obstruction.    Jose Wagner MD FACS    Dictated using Dragon voice recognition software which may result in transcription errors

## 2023-06-30 NOTE — LETTER
6/30/2023         RE: Vy Villalobos  [address] [address]        Dear Colleague,    Thank you for referring your patient, Vy Villalobos, to the [facility] [place]. Please see a copy of my visit note below.    Vy is a 58 year old who is being evaluated via a billable video visit.      How would you like to obtain your AVS? MyChart  If the video visit is dropped, the invitation should be resent by: Text to cell phone: [phone]  Will anyone else be joining your video visit? No    Video-Visit Details    Type of service:  Video Visit     Originating Location (pt. Location): Home    Distant Location (provider location):  On-site    Platform used for Video Visit: [employer]     [facility] [place] Progress Note    Vy Villalobos presents in follow-up of a left thigh port wine stain with left thigh swelling.  Please see my consultation of 6/29/2023 for details.  On today's video visit, we discussed her left lower extremity venous competency study which was obtained to specifically look for any incompetent perforators or large, feeding the veins to the port wine stain.    Physical Exam  General: Pleasant female in no acute distress.  Blood pressure 148/70, pulse 86  Extremities: Left lower extremity: Port wine stain over the left anterior, anteromedial and anterolateral thigh.  The left thigh appears larger than the right with fullness of the tissues near the port wine stain.  No varicose veins are appreciated and the port wine stain.  There are a few reticular veins visible over the anterior and anteromedial portion of the port wine stain.  No palpable veins deep to the area.  No significant tenderness.  No inflammatory skin changes.     No significant varicose veins over the remainder of the left leg.  Trace to 1+ edema of the left calf and ankle.    Ultrasound:  Name:  Vy Villalobos                                                      Patient ID: [id number]  Date:  2023                                                   : 1965  Sex: female                                                                 Examined by: LILLY Vasquez RVT  Age:  58 year old                                                         Reading MD: TERESSA Wagner MD     INDICATION:  Lt leg swelling with anterior thigh port wine stain     EXAM TYPE  LEFT LOWER EXTREMITY VENOUS DUPLEX FOR VENOUS INSUFFICIENCY TECHNICAL SUMMARY     A duplex ultrasound study using color flow was performed, to evaluate the left lower extremity veins for valvular incompetence with the patient in a steep reversed trendelenberg.      LEFT:     The deep veins demonstrate phasic flow, compress and respond to augmentations.  There is no  DVT.  The common femoral vein is incompetent and free of thrombus. The remaining deep veins are competent and free of thrombus.      The GSV demonstrates phasic flow, compresses and responds to augmentations from the saphenofemoral junction to the ankle with no evidence of thrombus. The great saphenous vein measures 5.1 mm at the saphenofemoral junction, 4.9 mm at the proximal thigh and 3.1 mm at the knee. The GSV is incompetent from SFJ to Proximal Thigh , with the greatest reflux time of 2458 milliseconds.  The GSV courses superficial from the mid thigh to the proximal calf. The GSV is tortuous at the knee. The GSV gives rise to multiple competent branches off the thigh that course deep towards the port wine stain but do not appear to communicate with the surface of the skin.     The AASV is not visualized.      The Giacomini vein is absent.     The SSV demonstrates phasic flow, compresses and responds to augmentations from the popliteal space to the ankle.  No reflux or thrombus is seen. The saphenopopliteal junction is competent ( 2.4 mm).      Perforators: there is no evidence of incompetent  veins at any level.       RIGHT:     The CFV demonstrate phasic flow,  compress and respond to augmentations.  There is no DVT.       FINAL SUMMARY:  Left lower extremity:  1.  No deep vein thrombosis  2.  Left common femoral vein incompetence  3.  Left saphenofemoral junction and proximal thigh great saphenous vein incompetence  4.  Small, competent vein branches coursing from right great saphenous vein deep to port wine stain.  These do not seem to course to the immediate subcutaneous tissues  5.  No incompetent  veins     Right lower extremity:  No right common femoral vein thrombosis or incompetence     Incompetence Criteria: Greater than 500 milliseconds reflux in the superficial and  veins and greater than 1000 milliseconds reflux in the deep veins.     Assessment:  Left thigh port wine stain with swelling of the thigh and, to a lesser extent, the left leg.  Repeat ultrasound did not reveal any feeding incompetent perforators or significant varicosities coursing to the port wine stain.  I discussed the ultrasound with the patient using a leg vein drawing.    I discussed her case with Dr. Ken Hung.  We will obtain a CT venogram of her abdomen and pelvis to ensure that there is no pelvic venous compression or stenosis that could be contributing to the swelling.  I discussed this frankly with the patient.    We will try to schedule this to be done in [place] as the patient lives near this hospital.    Plan:  CT venogram abdomen and pelvis to ensure there is no iliac vein compression or obstruction.    Jose Wagner MD FACS    Dictated using Dragon voice recognition software which may result in transcription errors                  Again, thank you for allowing me to participate in the care of your patient.        Sincerely,        Jose Wagner MD

## 2023-07-07 ENCOUNTER — HOSPITAL ENCOUNTER (OUTPATIENT)
Dept: CT IMAGING | Facility: CLINIC | Age: 58
Discharge: HOME | End: 2023-07-07
Attending: SURGERY | Admitting: SURGERY
Payer: COMMERCIAL

## 2023-07-07 DIAGNOSIS — Q82.5 PORT-WINE STAIN OF SKIN: ICD-10-CM

## 2023-07-07 PROCEDURE — 74174 CTA ABD&PLVS W/CONTRAST: CPT

## 2023-07-07 PROCEDURE — 250N000011 HC RX IP 250 OP 636: Performed by: SURGERY

## 2023-07-07 PROCEDURE — 250N000009 HC RX 250: Performed by: SURGERY

## 2023-07-07 RX ORDER — IOPAMIDOL 755 MG/ML
500 INJECTION, SOLUTION INTRAVASCULAR ONCE
Status: COMPLETED | OUTPATIENT
Start: 2023-07-07 | End: 2023-07-07

## 2023-07-07 RX ADMIN — SODIUM CHLORIDE 52 ML: 9 INJECTION, SOLUTION INTRAVENOUS at 14:21

## 2023-07-07 RX ADMIN — IOPAMIDOL 80 ML: 755 INJECTION, SOLUTION INTRAVENOUS at 14:21

## 2023-07-21 ENCOUNTER — TELEPHONE (OUTPATIENT)
Dept: VASCULAR SURGERY | Facility: CLINIC | Age: 58
End: 2023-07-21
Payer: COMMERCIAL

## 2023-07-21 PROCEDURE — 99213 OFFICE O/P EST LOW 20 MIN: CPT | Performed by: SURGERY

## 2023-07-25 NOTE — TELEPHONE ENCOUNTER
Telephone encounter documentation    I called Vy to discuss the fact that no significant abnormalities were found on her CT abdomen and pelvis and CT venogram.  Specifically, her inferior vena cava, bilateral common and external iliac and common femoral veins appear normal in size.  There were no significant venous collaterals noted.  No compression of the left common iliac vein was noted.    The patient was very happy to receive the results and had no questions.    ALIVIA Wagner MD, FACS    Dictated using Dragon voice recognition software which may result in transcription errors

## 2023-09-21 DIAGNOSIS — J45.20 MILD INTERMITTENT ASTHMA, UNCOMPLICATED: ICD-10-CM

## 2023-09-22 RX ORDER — ALBUTEROL SULFATE 90 UG/1
2 AEROSOL, METERED RESPIRATORY (INHALATION) EVERY 6 HOURS PRN
Qty: 18 G | Refills: 1 | Status: ON HOLD | OUTPATIENT
Start: 2023-09-22 | End: 2023-09-25

## 2023-09-22 RX ORDER — IPRATROPIUM BROMIDE AND ALBUTEROL SULFATE 2.5; .5 MG/3ML; MG/3ML
SOLUTION RESPIRATORY (INHALATION)
Qty: 180 ML | Refills: 4 | Status: ON HOLD | OUTPATIENT
Start: 2023-09-22 | End: 2023-09-25

## 2023-09-24 ENCOUNTER — HOSPITAL ENCOUNTER (OUTPATIENT)
Facility: CLINIC | Age: 58
Setting detail: OBSERVATION
Discharge: HOME | End: 2023-09-25
Attending: STUDENT IN AN ORGANIZED HEALTH CARE EDUCATION/TRAINING PROGRAM | Admitting: STUDENT IN AN ORGANIZED HEALTH CARE EDUCATION/TRAINING PROGRAM
Payer: COMMERCIAL

## 2023-09-24 ENCOUNTER — NURSE TRIAGE (OUTPATIENT)
Dept: NURSING | Facility: CLINIC | Age: 58
End: 2023-09-24
Payer: COMMERCIAL

## 2023-09-24 ENCOUNTER — APPOINTMENT (OUTPATIENT)
Dept: GENERAL RADIOLOGY | Facility: CLINIC | Age: 58
End: 2023-09-24
Attending: STUDENT IN AN ORGANIZED HEALTH CARE EDUCATION/TRAINING PROGRAM
Payer: COMMERCIAL

## 2023-09-24 DIAGNOSIS — R06.00 DYSPNEA, UNSPECIFIED TYPE: ICD-10-CM

## 2023-09-24 DIAGNOSIS — J45.20 MILD INTERMITTENT ASTHMA, UNCOMPLICATED: ICD-10-CM

## 2023-09-24 DIAGNOSIS — J45.41 MODERATE PERSISTENT ASTHMA WITH ACUTE EXACERBATION: Primary | Chronic | ICD-10-CM

## 2023-09-24 DIAGNOSIS — J45.41 MODERATE PERSISTENT ASTHMA WITH EXACERBATION: ICD-10-CM

## 2023-09-24 PROBLEM — R09.02 HYPOXIA: Status: ACTIVE | Noted: 2023-09-24

## 2023-09-24 PROBLEM — R06.03 RESPIRATORY DISTRESS: Status: ACTIVE | Noted: 2023-09-24

## 2023-09-24 PROBLEM — J45.21 MILD INTERMITTENT ASTHMA WITH ACUTE EXACERBATION: Status: ACTIVE | Noted: 2017-01-23

## 2023-09-24 LAB
ALLEN'S TEST: YES
ANION GAP SERPL CALCULATED.3IONS-SCNC: 16 MMOL/L (ref 7–15)
BASE EXCESS BLDA CALC-SCNC: -3 MMOL/L (ref -9–1.8)
BASOPHILS # BLD AUTO: 0.1 10E3/UL (ref 0–0.2)
BASOPHILS NFR BLD AUTO: 0 %
BUN SERPL-MCNC: 15.2 MG/DL (ref 6–20)
CALCIUM SERPL-MCNC: 10.1 MG/DL (ref 8.6–10)
CHLORIDE SERPL-SCNC: 100 MMOL/L (ref 98–107)
CREAT SERPL-MCNC: 0.75 MG/DL (ref 0.51–0.95)
DEPRECATED HCO3 PLAS-SCNC: 21 MMOL/L (ref 22–29)
EGFRCR SERPLBLD CKD-EPI 2021: >90 ML/MIN/1.73M2
EOSINOPHIL # BLD AUTO: 0.1 10E3/UL (ref 0–0.7)
EOSINOPHIL NFR BLD AUTO: 0 %
ERYTHROCYTE [DISTWIDTH] IN BLOOD BY AUTOMATED COUNT: 13 % (ref 10–15)
FLUAV RNA SPEC QL NAA+PROBE: NEGATIVE
FLUBV RNA RESP QL NAA+PROBE: NEGATIVE
GLUCOSE SERPL-MCNC: 138 MG/DL (ref 70–99)
HCO3 BLD-SCNC: 21 MMOL/L (ref 21–28)
HCT VFR BLD AUTO: 41.5 % (ref 35–47)
HGB BLD-MCNC: 13.8 G/DL (ref 11.7–15.7)
HOLD SPECIMEN: NORMAL
HOLD SPECIMEN: NORMAL
IMM GRANULOCYTES # BLD: 0.2 10E3/UL
IMM GRANULOCYTES NFR BLD: 1 %
LYMPHOCYTES # BLD AUTO: 1.2 10E3/UL (ref 0.8–5.3)
LYMPHOCYTES NFR BLD AUTO: 9 %
MCH RBC QN AUTO: 30.3 PG (ref 26.5–33)
MCHC RBC AUTO-ENTMCNC: 33.3 G/DL (ref 31.5–36.5)
MCV RBC AUTO: 91 FL (ref 78–100)
MONOCYTES # BLD AUTO: 0.4 10E3/UL (ref 0–1.3)
MONOCYTES NFR BLD AUTO: 3 %
NEUTROPHILS # BLD AUTO: 11.4 10E3/UL (ref 1.6–8.3)
NEUTROPHILS NFR BLD AUTO: 87 %
NRBC # BLD AUTO: 0 10E3/UL
NRBC BLD AUTO-RTO: 0 /100
O2/TOTAL GAS SETTING VFR VENT: 21 %
PCO2 BLD: 34 MM HG (ref 35–45)
PH BLD: 7.4 [PH] (ref 7.35–7.45)
PLATELET # BLD AUTO: 218 10E3/UL (ref 150–450)
PO2 BLD: 72 MM HG (ref 80–105)
POTASSIUM SERPL-SCNC: 4.3 MMOL/L (ref 3.4–5.3)
RBC # BLD AUTO: 4.55 10E6/UL (ref 3.8–5.2)
RSV RNA SPEC NAA+PROBE: NEGATIVE
SARS-COV-2 RNA RESP QL NAA+PROBE: NEGATIVE
SODIUM SERPL-SCNC: 137 MMOL/L (ref 136–145)
TROPONIN T SERPL HS-MCNC: 8 NG/L
WBC # BLD AUTO: 13.2 10E3/UL (ref 4–11)

## 2023-09-24 PROCEDURE — 99285 EMERGENCY DEPT VISIT HI MDM: CPT | Mod: 25 | Performed by: STUDENT IN AN ORGANIZED HEALTH CARE EDUCATION/TRAINING PROGRAM

## 2023-09-24 PROCEDURE — 93005 ELECTROCARDIOGRAM TRACING: CPT | Performed by: STUDENT IN AN ORGANIZED HEALTH CARE EDUCATION/TRAINING PROGRAM

## 2023-09-24 PROCEDURE — 250N000013 HC RX MED GY IP 250 OP 250 PS 637: Performed by: PEDIATRICS

## 2023-09-24 PROCEDURE — 87637 SARSCOV2&INF A&B&RSV AMP PRB: CPT | Performed by: PEDIATRICS

## 2023-09-24 PROCEDURE — 250N000011 HC RX IP 250 OP 636: Mod: JZ | Performed by: STUDENT IN AN ORGANIZED HEALTH CARE EDUCATION/TRAINING PROGRAM

## 2023-09-24 PROCEDURE — 71046 X-RAY EXAM CHEST 2 VIEWS: CPT

## 2023-09-24 PROCEDURE — 82803 BLOOD GASES ANY COMBINATION: CPT | Performed by: PEDIATRICS

## 2023-09-24 PROCEDURE — 99222 1ST HOSP IP/OBS MODERATE 55: CPT | Performed by: PEDIATRICS

## 2023-09-24 PROCEDURE — 250N000009 HC RX 250: Performed by: STUDENT IN AN ORGANIZED HEALTH CARE EDUCATION/TRAINING PROGRAM

## 2023-09-24 PROCEDURE — 93010 ELECTROCARDIOGRAM REPORT: CPT | Performed by: STUDENT IN AN ORGANIZED HEALTH CARE EDUCATION/TRAINING PROGRAM

## 2023-09-24 PROCEDURE — 999N000126 HC STATISTIC PEAK FLOW MEASUREMENT

## 2023-09-24 PROCEDURE — 80048 BASIC METABOLIC PNL TOTAL CA: CPT | Performed by: STUDENT IN AN ORGANIZED HEALTH CARE EDUCATION/TRAINING PROGRAM

## 2023-09-24 PROCEDURE — 36415 COLL VENOUS BLD VENIPUNCTURE: CPT | Performed by: STUDENT IN AN ORGANIZED HEALTH CARE EDUCATION/TRAINING PROGRAM

## 2023-09-24 PROCEDURE — 250N000009 HC RX 250: Performed by: PEDIATRICS

## 2023-09-24 PROCEDURE — 84484 ASSAY OF TROPONIN QUANT: CPT | Performed by: STUDENT IN AN ORGANIZED HEALTH CARE EDUCATION/TRAINING PROGRAM

## 2023-09-24 PROCEDURE — G0378 HOSPITAL OBSERVATION PER HR: HCPCS

## 2023-09-24 PROCEDURE — 85025 COMPLETE CBC W/AUTO DIFF WBC: CPT | Performed by: STUDENT IN AN ORGANIZED HEALTH CARE EDUCATION/TRAINING PROGRAM

## 2023-09-24 PROCEDURE — 96361 HYDRATE IV INFUSION ADD-ON: CPT | Performed by: STUDENT IN AN ORGANIZED HEALTH CARE EDUCATION/TRAINING PROGRAM

## 2023-09-24 PROCEDURE — 96365 THER/PROPH/DIAG IV INF INIT: CPT

## 2023-09-24 PROCEDURE — 96374 THER/PROPH/DIAG INJ IV PUSH: CPT | Performed by: STUDENT IN AN ORGANIZED HEALTH CARE EDUCATION/TRAINING PROGRAM

## 2023-09-24 PROCEDURE — 94640 AIRWAY INHALATION TREATMENT: CPT

## 2023-09-24 PROCEDURE — 96375 TX/PRO/DX INJ NEW DRUG ADDON: CPT

## 2023-09-24 RX ORDER — IPRATROPIUM BROMIDE AND ALBUTEROL SULFATE 2.5; .5 MG/3ML; MG/3ML
3 SOLUTION RESPIRATORY (INHALATION) ONCE
Status: COMPLETED | OUTPATIENT
Start: 2023-09-24 | End: 2023-09-24

## 2023-09-24 RX ORDER — IPRATROPIUM BROMIDE AND ALBUTEROL SULFATE 2.5; .5 MG/3ML; MG/3ML
3 SOLUTION RESPIRATORY (INHALATION)
Status: DISCONTINUED | OUTPATIENT
Start: 2023-09-24 | End: 2023-09-25

## 2023-09-24 RX ORDER — FLUTICASONE FUROATE AND VILANTEROL 100; 25 UG/1; UG/1
1 POWDER RESPIRATORY (INHALATION) DAILY
Status: DISCONTINUED | OUTPATIENT
Start: 2023-09-24 | End: 2023-09-25 | Stop reason: HOSPADM

## 2023-09-24 RX ORDER — ACETAMINOPHEN 650 MG/1
650 SUPPOSITORY RECTAL EVERY 4 HOURS PRN
Status: DISCONTINUED | OUTPATIENT
Start: 2023-09-24 | End: 2023-09-25 | Stop reason: HOSPADM

## 2023-09-24 RX ORDER — LIDOCAINE 40 MG/G
CREAM TOPICAL
Status: DISCONTINUED | OUTPATIENT
Start: 2023-09-24 | End: 2023-09-25 | Stop reason: HOSPADM

## 2023-09-24 RX ORDER — MAGNESIUM SULFATE HEPTAHYDRATE 40 MG/ML
2 INJECTION, SOLUTION INTRAVENOUS ONCE
Status: COMPLETED | OUTPATIENT
Start: 2023-09-24 | End: 2023-09-24

## 2023-09-24 RX ORDER — ALBUTEROL SULFATE 2.5 MG/.5ML
15 SOLUTION RESPIRATORY (INHALATION) ONCE
Status: COMPLETED | OUTPATIENT
Start: 2023-09-24 | End: 2023-09-24

## 2023-09-24 RX ORDER — ACETAMINOPHEN 325 MG/1
650 TABLET ORAL EVERY 4 HOURS PRN
Status: DISCONTINUED | OUTPATIENT
Start: 2023-09-24 | End: 2023-09-25 | Stop reason: HOSPADM

## 2023-09-24 RX ORDER — METHYLPREDNISOLONE SODIUM SUCCINATE 125 MG/2ML
125 INJECTION, POWDER, LYOPHILIZED, FOR SOLUTION INTRAMUSCULAR; INTRAVENOUS ONCE
Status: COMPLETED | OUTPATIENT
Start: 2023-09-24 | End: 2023-09-24

## 2023-09-24 RX ORDER — ALBUTEROL SULFATE 0.83 MG/ML
3 SOLUTION RESPIRATORY (INHALATION)
Status: DISCONTINUED | OUTPATIENT
Start: 2023-09-24 | End: 2023-09-25

## 2023-09-24 RX ORDER — ALBUTEROL SULFATE 5 MG/ML
SOLUTION, NON-ORAL INHALATION CONTINUOUS
Status: CANCELLED | OUTPATIENT
Start: 2023-09-24

## 2023-09-24 RX ORDER — PREDNISONE 20 MG/1
40 TABLET ORAL DAILY
Status: DISCONTINUED | OUTPATIENT
Start: 2023-09-25 | End: 2023-09-25 | Stop reason: HOSPADM

## 2023-09-24 RX ORDER — PREDNISONE 20 MG/1
40 TABLET ORAL DAILY
COMMUNITY
Start: 2023-09-21 | End: 2023-09-26

## 2023-09-24 RX ADMIN — IPRATROPIUM BROMIDE AND ALBUTEROL SULFATE 3 ML: 2.5; .5 SOLUTION RESPIRATORY (INHALATION) at 22:02

## 2023-09-24 RX ADMIN — ACETAMINOPHEN 650 MG: 325 TABLET, FILM COATED ORAL at 19:56

## 2023-09-24 RX ADMIN — ALBUTEROL SULFATE 2.5 MG: 2.5 SOLUTION RESPIRATORY (INHALATION) at 19:50

## 2023-09-24 RX ADMIN — ALBUTEROL SULFATE 2.5 MG: 2.5 SOLUTION RESPIRATORY (INHALATION) at 23:57

## 2023-09-24 RX ADMIN — ALBUTEROL SULFATE 15 MG: 2.5 SOLUTION RESPIRATORY (INHALATION) at 13:59

## 2023-09-24 RX ADMIN — IPRATROPIUM BROMIDE AND ALBUTEROL SULFATE 3 ML: 2.5; .5 SOLUTION RESPIRATORY (INHALATION) at 13:15

## 2023-09-24 RX ADMIN — IPRATROPIUM BROMIDE AND ALBUTEROL SULFATE 3 ML: 2.5; .5 SOLUTION RESPIRATORY (INHALATION) at 17:41

## 2023-09-24 RX ADMIN — METHYLPREDNISOLONE SODIUM SUCCINATE 125 MG: 125 INJECTION, POWDER, FOR SOLUTION INTRAMUSCULAR; INTRAVENOUS at 13:28

## 2023-09-24 RX ADMIN — MAGNESIUM SULFATE HEPTAHYDRATE 2 G: 40 INJECTION, SOLUTION INTRAVENOUS at 14:09

## 2023-09-24 ASSESSMENT — ACTIVITIES OF DAILY LIVING (ADL)
ADLS_ACUITY_SCORE: 31
ADLS_ACUITY_SCORE: 33
ADLS_ACUITY_SCORE: 31
ADLS_ACUITY_SCORE: 31
ADLS_ACUITY_SCORE: 35
ADLS_ACUITY_SCORE: 31

## 2023-09-24 ASSESSMENT — ENCOUNTER SYMPTOMS
WHEEZING: 1
COUGH: 1
SHORTNESS OF BREATH: 1

## 2023-09-24 NOTE — ED TRIAGE NOTES
Patient with asthma exacerbation, has been on steroids, using her inhalers/nebs at home. Wheezy in triage.      Triage Assessment       Row Name 09/24/23 1235       Respiratory WDL    Respiratory WDL X       Skin Circulation/Temperature WDL    Skin Circulation/Temperature WDL WDL       Cardiac WDL    Cardiac WDL WDL

## 2023-09-24 NOTE — PROGRESS NOTES
S-(situation): Patient registered to Observation. Patient arrived to room 252 via wheelchair from ED.     B-(background): Acute asthma exacerbation.     A-(assessment): Pt arrives to room VSS on RA. A&Ox4. Labored breathing present. No longer tachypneic. Posterior LS note expiratory wheezing throughout.     R-(recommendations): Orders and observation goals reviewed with pt and .     Nursing Observation criteria listed below was met:    Skin issues/needs documented:NA  Isolation needs addressed and Signage up: Yes  Fall Prevention: Education given and documented: NA  Education Assessment documented:Yes  Admission Education Documented: Yes  New medication patient education completed and documented (Possible Side Effects of Common Medications handout): Yes  OBS video/handout Reviewed & Documented: Yes  Allergies Reviewed: Yes  Medication Reconciliation Complete: Yes  Home medications if not able to send immediately home with family stored here: Yes  Reminder note placed in discharge instructions of home meds: Yes  Patient has discharge needs (If yes, please explain): No  Patient discharge preferences addressed and charted on white board:  No  Provider notified that patient has arrived to the unit: Yes

## 2023-09-24 NOTE — MEDICATION SCRIBE - ADMISSION MEDICATION HISTORY
Medication Scribe Admission Medication History    Admission medication history is complete. The information provided in this note is only as accurate as the sources available at the time of the update.    Medication reconciliation/reorder completed by provider prior to medication history? No    Information Source(s): Patient and CareEverywhere/SureScripts via in-person    Pertinent Information: n/a    Changes made to PTA medication list:  Added: prednisone  Deleted: None  Changed: losartan and simvastatin to HS    Medication Affordability:  Not including over the counter (OTC) medications, was there a time in the past 3 months when you did not take your medications as prescribed because of cost?: Yes    Allergies reviewed with patient and updates made in EHR: yes    Medication History Completed By: SURI WELLER 9/24/2023 4:39 PM    Prior to Admission medications    Medication Sig Last Dose Taking? Auth Provider Long Term End Date   albuterol (PROAIR HFA/PROVENTIL HFA/VENTOLIN HFA) 108 (90 Base) MCG/ACT inhaler Inhale 2 puffs into the lungs every 6 hours as needed for shortness of breath 9/23/2023 at 2200 Yes Kandace Rodriguez NP Yes    CALCIUM-VITAMIN D PO Take 1 tablet by mouth daily daily 9/24/2023 at 0900 Yes Reported, Patient     ipratropium - albuterol 0.5 mg/2.5 mg/3 mL (DUONEB) 0.5-2.5 (3) MG/3ML neb solution TAKE 1 VIAL (3 MLS) BY NEBULIZATION EVERY 6 HOURS AS NEEDED FOR SHORTN ESS OF BREATH / DYSPNEA  Patient taking differently: Take 1 vial by nebulization every 6 hours as needed for shortness of breath or wheezing 9/24/2023 at 0900 Yes Kandace Rodriguez NP Yes    losartan (COZAAR) 25 MG tablet TAKE ONE-HALF TABLET BY MOUTH DAILY  Patient taking differently: Take 12.5 mg by mouth At Bedtime 9/23/2023 at hs Yes Kandace Rodriguez NP Yes    metoprolol succinate ER (TOPROL XL) 100 MG 24 hr tablet Take 1 tablet (100 mg) by mouth daily 9/24/2023 at 0900 Yes Kandace Rodriguez NP Yes    Multiple  Vitamins-Minerals (CENTRUM PO) Take 1 tablet by mouth daily 9/24/2023 at 0900 Yes Reported, Patient     predniSONE (DELTASONE) 20 MG tablet Take 40 mg by mouth daily 9/24/2023 at 0900 Yes Reported, Patient     sertraline (ZOLOFT) 50 MG tablet Take 1.5 tablets (75 mg) by mouth daily 9/24/2023 at 0900 Yes Kandace Rodriguez, EARLINE Yes    simvastatin (ZOCOR) 40 MG tablet Take 1 tablet (40 mg) by mouth daily  Patient taking differently: Take 40 mg by mouth At Bedtime 9/23/2023 at hs Yes Kandace Rodriguez, NP Yes    VITAMIN C, CALCIUM ASCORBATE, PO Take  by mouth. daily 9/24/2023 at am Yes Reported, Patient

## 2023-09-24 NOTE — H&P
[facility]    History and Physical - Hospitalist Service       Date of Admission:  9/24/2023    Assessment & Plan      Vy Villalobos is a 58 year old woman with intermittent asthma, hypertension, hyperlipidemia, and obesity presenting with 1 week history of worsening shortness of breath and wheezing despite treatment over the last few days with corticosteroids and bronchodilators.  She presented to the ER with respiratory distress today and was intermittently severely hypoxic in the ER.  After treatment in the ER including corticosteroids and bronchodilators, she is improved but continues to be symptomatic with dyspnea, wheezing, and signs of respiratory distress.  She is at high risk for an adverse outcome including worsening asthma exacerbation, respiratory failure, and death, so ongoing hospitalization is considered medically necessary.  Based on the presently available information, hospitalization for about 24 hours is anticipated.    Principal Problem:    Mild intermittent asthma with acute exacerbation  Active Problems:    Respiratory distress    Hypoxia    Hypertension goal BP (blood pressure) < 140/90    Morbid obesity (H)    Hyperlipidemia LDL goal <100    Mild intermittent asthma with acute exacerbation  Respiratory distress  Hypoxia  Has not recently used maintenance treatment for asthma and rarely uses her rescue inhaler.  She has been prescribed maintenance inhaler therapy in the past but did not use it because the brand she was prescribed was not covered by insurance.  Over the course of the week this week, she had worsening dyspnea and wheezing as well as URI symptoms which is the likely trigger for asthma exacerbation.  Symptoms worsened despite treatment with bronchodilators and corticosteroids.  In the ER she demonstrated signs of respiratory distress and was intermittently severely hypoxic with oxygen saturation as low as 89% in room air.  She was treated in the ER  today with IV Solu-Medrol, DuoNeb, and albuterol nebulization but continues to feel dyspneic with signs of respiratory distress and wheezing.  -[place] to observation  -Continue corticosteroids but will switch to oral prednisone  -Continue bronchodilators by nebulization every 2 hours alternating between DuoNeb every 4 hours and albuterol every 4 hours  -Monitor respiratory effort and pulse oximetry closely with low threshold to add oxygen supplementation  -Ordered ABG to assess not only for hypoxia but also respiratory acidosis and risk stratification of her asthma exacerbation  -Ordered PCR testing for COVID-19 as well as influenza and RSV and would consider additional antiviral therapies if those results are positive as indicated  -Discussed starting maintenance inhaler therapy for asthma with which she was agreeable because of the severity of her current illness and the fact that 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  However, tachypnea is probably due to asthma exacerbation and leukocytosis is probably due to recent use of corticosteroids.  Bacterial infection and sepsis are not suspected.  -Monitor clinically for signs of infection and reconsider additional diagnostic evaluation and/or antimicrobial therapy if there is increasing suspicion for bacterial infection    Hypertension  Chronic hypertension is treated with Toprol-XL and losartan.  Blood pressure is moderately to severely elevated today in context of acute illness including respiratory distress.  Although beta-blocker therapy could theoretically increase risk for asthma, she chronically takes Toprol-XL without  regular asthma symptoms and with only infrequent need to use her rescue inhaler.  -Continue chronic doses of Toprol-XL and losartan  -Reconsider adding IV antihypertensive medication for severe hypertension if blood pressures trend upward    Hyperlipidemia  Chronic hyperlipidemia is normally treated with statin therapy.  -Continue chronic statin    Morbid obesity  Appears obese with BMI 40  -No acute intervention anticipated         Diet:  Regular  DVT Prophylaxis: Observation status  Hung Catheter: Not present  Lines: None     Cardiac Monitoring: None  Code Status:  Full resuscitation    Clinically Significant Risk Factors Present on Admission                  # Hypertension: Noted on problem list          # Asthma: noted on problem list        Disposition Plan      Expected Discharge Date: 09/25/2023                  Rodger Leon MD  Hospitalist Service  [facility]  Securely message with [employer] (more info)  Text page via [facility] Paging/Directory     ______________________________________________________________________    Chief Complaint   Shortness of breath    History is obtained from the patient, electronic health record, and emergency department physician    History of Present Illness   Vy Villalobos is a 58 year old female who reports that she has had recent URI symptoms after exposure to ill children in the school environment.  She started feeling more short of breath within the last week and was using her rescue inhaler frequently without improvement.  She presented to urgent care for evaluation on September 20 and was prescribed 5 days of prednisone, DuoNebs, and albuterol.  She has been using those medications at home over the last few days, but her dyspnea and wheezing have persisted.  Dyspnea is worse with activity, and her activity tolerance has been markedly limited the last few days.  Dyspnea is better at rest although not relieved by rest.  She has not been able to  sleep at night because of worsening dyspnea and has had to sleep sitting upright which is new.  She has felt lightheaded and dizzy like she might faint.  Neither she nor her spouse have noted cyanosis.  Overall symptoms have been worsening the last few days, so she presented to the ER for evaluation today.  In the ER, Dr. Ashton reports that she appeared to be in respiratory distress.  She was treated in the ER today with doses of IV Solu-Medrol, DuoNeb, and albuterol neb.  Oxygen saturations were intermittently low in the ER, but she has not started oxygen supplementation.  After ER treatment today, she says she feels somewhat better but continues to feel short of breath at rest with prominent wheezing.  She is now seen in the ER for evaluation.      Past Medical History    Past Medical History:   Diagnosis Date    Cardiomegaly 11/07/2007    Ventricular - see echo 10/07    Cardiomegaly 11/07/2007    Ventricular - see echo 10/07    Microalbuminuria 02/20/2018    Microalbuminuria 02/20/2018    Mild intermittent asthma     albuterol prn, prednisone as needed.    MITRAL VALVE DISORDER 11/07/2007    Regurg - mild - see echo 10/07     Patient has had longstanding intermittent asthma.  She has never been hospitalized for asthma exacerbation in the past.  She has not used maintenance inhaler therapy for treatment of asthma.  She says she was prescribed maintenance inhalers in the past but the cost of those medications was declined by her insurance company so she did not fill them or use them.  She normally uses her rescue inhaler only infrequently.    She denies any history of heart disease.    Past Surgical History   Past Surgical History:   Procedure Laterality Date    HC HYSTEROSCOPY W ENDOMETRIAL BX/POLYPECTOMY W/WO D&C  10/26/2000    Hysteroscopy, dilation and curettage, and diagnostic laparoscopy     REMOVAL OF OVARIAN CYST(S)      laparascopic ovarian cystectomy.    HYSTEROSCOPY  11/30/2007    Hysteroscopy,  D&C and endometrial ablation.    RELEASE TRIGGER FINGER Left 7/26/2019    Procedure: left ring trigger finger release;  Surgeon: Rodger Hawkins DO;  Location: [facility] OR    TUBAL LIGATION      1990       Prior to Admission Medications   Prior to Admission Medications   Prescriptions Last Dose Informant Patient Reported? Taking?   CALCIUM-VITAMIN D PO   Yes No   Sig: Take  by mouth. daily   Multiple Vitamins-Minerals (CENTRUM PO)   Yes No   Sig: Take  by mouth.   VITAMIN C, CALCIUM ASCORBATE, PO   Yes No   Sig: Take  by mouth. daily   albuterol (PROAIR HFA/PROVENTIL HFA/VENTOLIN HFA) 108 (90 Base) MCG/ACT inhaler   No No   Sig: Inhale 2 puffs into the lungs every 6 hours as needed for shortness of breath   ipratropium - albuterol 0.5 mg/2.5 mg/3 mL (DUONEB) 0.5-2.5 (3) MG/3ML neb solution   No No   Sig: TAKE 1 VIAL (3 MLS) BY NEBULIZATION EVERY 6 HOURS AS NEEDED FOR SHORTN ESS OF BREATH / DYSPNEA   losartan (COZAAR) 25 MG tablet   No No   Sig: TAKE ONE-HALF TABLET BY MOUTH DAILY   metoprolol succinate ER (TOPROL XL) 100 MG 24 hr tablet   No No   Sig: Take 1 tablet (100 mg) by mouth daily   sertraline (ZOLOFT) 50 MG tablet   No No   Sig: Take 1.5 tablets (75 mg) by mouth daily   simvastatin (ZOCOR) 40 MG tablet   No No   Sig: Take 1 tablet (40 mg) by mouth daily      Facility-Administered Medications: None        Review of Systems    The 10 point Review of Systems is negative other than noted in the HPI or here.     Social History   I have reviewed this patient's social history and updated it with pertinent information if needed.  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    Allergies   Allergies   Allergen Reactions     Doxycycline Itching    Miconazole Rash        Physical Exam   Vital Signs: Temp: 97.9  F (36.6  C) Temp src: Oral BP: (!) 161/62 Pulse: 63   Resp: 22 SpO2: 90 % O2 Device: None (Room air)    Patient Vitals for the past 24 hrs:   BP Temp Temp src Pulse Resp SpO2 Weight   09/24/23 1537 -- -- -- -- -- 90 % --   09/24/23 1534 -- -- -- -- -- 91 % --   09/24/23 1533 -- -- -- -- -- 94 % --   09/24/23 1520 -- -- -- -- -- 95 % --   09/24/23 1517 -- -- -- -- -- (!) 89 % --   09/24/23 1516 -- -- -- -- -- 93 % --   09/24/23 1515 (!) 161/62 -- -- -- -- 90 % --   09/24/23 1514 -- -- -- -- -- 94 % --   09/24/23 1513 -- -- -- -- -- 95 % --   09/24/23 1400 -- -- -- -- -- 93 % --   09/24/23 1359 -- -- -- -- -- 95 % --   09/24/23 1358 -- -- -- -- -- 93 % --   09/24/23 1357 (!) 163/67 -- -- 63 -- -- --   09/24/23 1234 (!) 186/94 97.9  F (36.6  C) Oral 62 22 97 % 111.1 kg (245 lb)     Weight: 245 lbs 0 oz Body mass index is 40.27 kg/m .    Constitutional: Morbidly obese appearing middle-aged woman with signs of respiratory distress while lying on her bed in the ER  Eyes: Anicteric sclerae, clear conjunctivae  ENT: No nasal drainage, clear ear canals, normal oropharynx  Neck: Trachea midline, no stridor, symmetric  Hematologic / Lymphatic: no cervical lymphadenopathy and no supraclavicular lymphadenopathy  Respiratory: Borderline tachypneic and taking deep breaths with prolonged expiratory phase of 4 to 5 seconds, able to speak sentences, inspiratory breath sounds are good but she has diffuse high-pitched expiratory wheezing with very prolonged expiratory phase and poor expiratory air movement  Cardiovascular: Regular rate and rhythm, good radial pulse, brisk capillary refill, no JVD, no peripheral edema  GI: Morbidly obese abdomen, soft without tenderness  Skin: Normal color, no rash  Musculoskeletal: No gross limb anomalies  Neurologic: Alert, no confusion, purposefully and symmetrically moves limbs  Neuropsychiatric: Normal mood and  affect for the circumstances    Medical Decision Making       62 MINUTES SPENT BY ME on the date of service doing chart review, history, exam, documentation & further activities per the note.      Data     I have personally reviewed the following data over the past 24 hrs:    13.2 (H)  \   13.8   / 218     137 100 15.2 /  138 (H)   4.3 21 (L) 0.75 \     Trop: 8 BNP: N/A       EKG reviewed: Normal sinus rhythm, no acute ischemic changes    Imaging results reviewed over the past 24 hrs:   Recent Results (from the past 24 hour(s))   Chest XR,  PA & LAT    Narrative    EXAM: XR CHEST 2 VIEWS  LOCATION: [facility]  DATE: 9/24/2023    INDICATION: Asthma exacerbation, shortness of breath  COMPARISON: Chest radiograph 01/15/2013.      Impression    IMPRESSION: Negative chest.     Recent Labs   Lab 09/24/23  1322   WBC 13.2*   HGB 13.8   MCV 91         POTASSIUM 4.3   CHLORIDE 100   CO2 21*   BUN 15.2   CR 0.75   ANIONGAP 16*   VINICIO 10.1*   *

## 2023-09-24 NOTE — ED NOTES
15mg per hour albuterol neb started at 1410.   Pt has mostly upper airway wheezing before neb  Spo2 92% on room air while resting comfortably lying down.  No distress noted

## 2023-09-24 NOTE — ED PROVIDER NOTES
History     Chief Complaint   Patient presents with    Asthma Exacerbation     HPI  Vy Villalobos is a 58 year old female who presents with difficulty breathing.  Patient notes that she is been having issues with her asthma since Wednesday.  Seen in urgent care [place] who provided her with DuoNebs and steroids of which tomorrows her last day and notes that her breathing has not gotten a lot better.  She has right-sided chest pain with coughing.  She has not had any nausea vomiting fevers any abdominal pain dysuria or stooling issues.  She has not been intubated or hospitalized for asthma/patient in the past.  She does not smoke or drink alcohol.  She has not been exposed to any significant allergens.    Allergies:  Allergies   Allergen Reactions    Doxycycline Itching    Miconazole Rash       Problem List:    Patient Active Problem List    Diagnosis Date Noted    Major depressive disorder, recurrent episode, in full remission (H) 01/27/2022     Priority: Medium    Abnormal fasting glucose 01/23/2017     Priority: Medium    Mild intermittent asthma, uncomplicated 01/23/2017     Priority: Medium    SYLVIA (generalized anxiety disorder) 01/26/2016     Priority: Medium    Family history of malignant neoplasm of breast 08/11/2014     Priority: Medium     Maternal aunt - fatal in her 40's      Elevated TSH 08/11/2014     Priority: Medium    Snoring 03/08/2011     Priority: Medium    Hypertension goal BP (blood pressure) < 140/90 12/23/2010     Priority: Medium    Hyperlipidemia LDL goal <100 08/17/2009     Priority: Medium    Leiomyoma of uterus 11/27/2007     Priority: Medium     Problem list name updated by automated process. Provider to review      Mitral valve disorder 11/07/2007     Priority: Medium     Regurg - mild - see echo 10/07  Problem list name updated by automated process. Provider to review      Morbid obesity (H) 11/01/2002     Priority: Medium     Problem list name updated by automated process. Provider to  review          Past Medical History:    Past Medical History:   Diagnosis Date    Cardiomegaly 2007    Cardiomegaly 2007    Microalbuminuria 2018    Microalbuminuria 2018    Mild intermittent asthma     MITRAL VALVE DISORDER 2007       Past Surgical History:    Past Surgical History:   Procedure Laterality Date    HC HYSTEROSCOPY W ENDOMETRIAL BX/POLYPECTOMY W/WO D&C  10/26/2000    Hysteroscopy, dilation and curettage, and diagnostic laparoscopy    HC REMOVAL OF OVARIAN CYST(S)      laparascopic ovarian cystectomy.    HYSTEROSCOPY  2007    Hysteroscopy, D&C and endometrial ablation.    RELEASE TRIGGER FINGER Left 2019    Procedure: left ring trigger finger release;  Surgeon: Rodger Hawkins DO;  Location: [facility] OR    TUBAL LIGATION             Family History:    Family History   Problem Relation Age of Onset    Hypertension Mother     Arthritis Mother     Eye Disorder Mother         glaucoma    Lipids Mother     Hypertension Father     Eye Disorder Father         glaucoma    Lipids Father     Respiratory Father         asthma    Breast Cancer Maternal Aunt          in her 40's    Alzheimer Disease Paternal Aunt         dx at 69    Eye Disorder Maternal Grandmother         glaucoma       Social History:  Marital Status:   [2]  Social History     Tobacco Use    Smoking status: Never    Smokeless tobacco: Never   Vaping Use    Vaping Use: Never used   Substance Use Topics    Alcohol use: Yes     Comment: socially    Drug use: No        Medications:    albuterol (PROAIR HFA/PROVENTIL HFA/VENTOLIN HFA) 108 (90 Base) MCG/ACT inhaler  CALCIUM-VITAMIN D PO  ipratropium - albuterol 0.5 mg/2.5 mg/3 mL (DUONEB) 0.5-2.5 (3) MG/3ML neb solution  losartan (COZAAR) 25 MG tablet  metoprolol succinate ER (TOPROL XL) 100 MG 24 hr tablet  Multiple Vitamins-Minerals (CENTRUM PO)  sertraline (ZOLOFT) 50 MG tablet  simvastatin (ZOCOR) 40 MG tablet  VITAMIN C, CALCIUM  ASCORBATE, PO          Review of Systems   Respiratory:  Positive for cough, shortness of breath and wheezing.    All other systems reviewed and are negative.      Physical Exam   BP: (!) 186/94  Pulse: 62  Temp: 97.9  F (36.6  C)  Resp: 22  Weight: 111.1 kg (245 lb)  SpO2: 97 %      Physical Exam  Vitals and nursing note reviewed.   Constitutional:       Appearance: Normal appearance. She is obese.   HENT:      Head: Normocephalic and atraumatic.   Cardiovascular:      Rate and Rhythm: Normal rate.      Pulses: Normal pulses.      Heart sounds: Normal heart sounds.   Pulmonary:      Effort: No respiratory distress.      Breath sounds: Wheezing (bilateral R>L) and rales (bilateral lower lobes) present.   Chest:      Chest wall: No tenderness.   Abdominal:      General: Abdomen is flat. Bowel sounds are normal.      Palpations: Abdomen is soft.   Musculoskeletal:         General: Normal range of motion.      Right lower leg: No edema.      Left lower leg: No edema.   Skin:     General: Skin is warm and dry.      Capillary Refill: Capillary refill takes less than 2 seconds.      Findings: No bruising, erythema, lesion or rash.   Neurological:      General: No focal deficit present.      Mental Status: She is alert and oriented to person, place, and time. Mental status is at baseline.   Psychiatric:         Mood and Affect: Mood normal.         ED Course                 Procedures  EKG self reviewed at 1311.  Normal sinus rhythm.  No acute signs of elevation and T wave inversions or elevations.  AZ and QTc appropriate.  Normal EKG                Results for orders placed or performed during the hospital encounter of 09/24/23 (from the past 24 hour(s))   Troponin T, High Sensitivity   Result Value Ref Range    Troponin T, High Sensitivity 8 <=14 ng/L   CBC with platelets differential    Narrative    The following orders were created for panel order CBC with platelets differential.  Procedure                                Abnormality         Status                     ---------                               -----------         ------                     CBC with platelets and d...[[id number]]  Abnormal            Final result                 Please view results for these tests on the individual orders.   Basic metabolic panel   Result Value Ref Range    Sodium 137 136 - 145 mmol/L    Potassium 4.3 3.4 - 5.3 mmol/L    Chloride 100 98 - 107 mmol/L    Carbon Dioxide (CO2) 21 (L) 22 - 29 mmol/L    Anion Gap 16 (H) 7 - 15 mmol/L    Urea Nitrogen 15.2 6.0 - 20.0 mg/dL    Creatinine 0.75 0.51 - 0.95 mg/dL    Calcium 10.1 (H) 8.6 - 10.0 mg/dL    Glucose 138 (H) 70 - 99 mg/dL    GFR Estimate >90 >60 mL/min/1.73m2   Extra Tube    Narrative    The following orders were created for panel order Extra Tube.  Procedure                               Abnormality         Status                     ---------                               -----------         ------                     Extra Blue Top Tube[[id number]]                              Final result               Extra Purple Top Tube[[id number]]                            Final result                 Please view results for these tests on the individual orders.   Extra Blue Top Tube   Result Value Ref Range    Hold Specimen JIC    Extra Purple Top Tube   Result Value Ref Range    Hold Specimen JIC    CBC with platelets and differential   Result Value Ref Range    WBC Count 13.2 (H) 4.0 - 11.0 10e3/uL    RBC Count 4.55 3.80 - 5.20 10e6/uL    Hemoglobin 13.8 11.7 - 15.7 g/dL    Hematocrit 41.5 35.0 - 47.0 %    MCV 91 78 - 100 fL    MCH 30.3 26.5 - 33.0 pg    MCHC 33.3 31.5 - 36.5 g/dL    RDW 13.0 10.0 - 15.0 %    Platelet Count 218 150 - 450 10e3/uL    % Neutrophils 87 %    % Lymphocytes 9 %    % Monocytes 3 %    % Eosinophils 0 %    % Basophils 0 %    % Immature Granulocytes 1 %    NRBCs per 100 WBC 0 <1 /100    Absolute Neutrophils 11.4 (H) 1.6 - 8.3 10e3/uL    Absolute Lymphocytes 1.2 0.8 - 5.3  10e3/uL    Absolute Monocytes 0.4 0.0 - 1.3 10e3/uL    Absolute Eosinophils 0.1 0.0 - 0.7 10e3/uL    Absolute Basophils 0.1 0.0 - 0.2 10e3/uL    Absolute Immature Granulocytes 0.2 <=0.4 10e3/uL    Absolute NRBCs 0.0 10e3/uL   Chest XR,  PA & LAT    Narrative    EXAM: XR CHEST 2 VIEWS  LOCATION: [facility]  DATE: 9/24/2023    INDICATION: Asthma exacerbation, shortness of breath  COMPARISON: Chest radiograph 01/15/2013.      Impression    IMPRESSION: Negative chest.       Medications   methylPREDNISolone sodium succinate (solu-MEDROL) injection 125 mg (125 mg Intravenous $Given 9/24/23 1328)   ipratropium - albuterol 0.5 mg/2.5 mg/3 mL (DUONEB) neb solution 3 mL (3 mLs Nebulization $Given 9/24/23 1315)   magnesium sulfate 2 g in 50 mL sterile water intermittent infusion (0 g Intravenous Stopped 9/24/23 1507)   Albuterol Sulfate NEBU 15 mg (15 mg Nebulization $Given 9/24/23 1351)       Assessments & Plan (with Medical Decision Making)     I have reviewed the nursing notes.    I have reviewed the findings, diagnosis, plan and need for follow up with the patient.           Medical Decision Making    50-year-old female presenting with asthma/patient.  Patient notes her symptoms earlier this week and saw urgent care on Wednesday and provided her with DuoNebs and steroids however she notes that her symptoms got worse.  On arrival she is breathing rapidly and has bilateral wheeze.  Patient received 1 DuoNeb and 1 hour-long butyryl treatment with methylprednisolone.  After observation for a total of approximate 3 hours patient has continued wheeze even though she looks improved still feels significantly exhausted and tired.  She still intermittently showing increased work of breathing at this time feel the patient would likely benefit from an observation standpoint in the inpatient setting.  Discussed case with hospitalist group will admit for observation.  Discussed case with patient and she  feels that this is appropriate.    New Prescriptions    No medications on file       Final diagnoses:   Moderate persistent asthma with exacerbation   Dyspnea, unspecified type       9/24/2023   [facility] EMERGENCY DEPT       Brock Ashton MD  09/24/23 [id number]       Brock Ashton MD  09/24/23 [id number]

## 2023-09-25 ENCOUNTER — TELEPHONE (OUTPATIENT)
Dept: FAMILY MEDICINE | Facility: CLINIC | Age: 58
End: 2023-09-25
Payer: COMMERCIAL

## 2023-09-25 VITALS
DIASTOLIC BLOOD PRESSURE: 66 MMHG | WEIGHT: 238.7 LBS | HEIGHT: 64 IN | RESPIRATION RATE: 18 BRPM | OXYGEN SATURATION: 93 % | BODY MASS INDEX: 40.75 KG/M2 | TEMPERATURE: 98 F | HEART RATE: 77 BPM | SYSTOLIC BLOOD PRESSURE: 150 MMHG

## 2023-09-25 PROBLEM — R06.03 RESPIRATORY DISTRESS: Status: RESOLVED | Noted: 2023-09-24 | Resolved: 2023-09-25

## 2023-09-25 PROBLEM — J45.901 MODERATE ASTHMA WITH ACUTE EXACERBATION: Chronic | Status: ACTIVE | Noted: 2023-09-25

## 2023-09-25 LAB
ALBUMIN SERPL BCG-MCNC: 4.3 G/DL (ref 3.5–5.2)
ALP SERPL-CCNC: 93 U/L (ref 35–104)
ALT SERPL W P-5'-P-CCNC: 25 U/L (ref 0–50)
ANION GAP SERPL CALCULATED.3IONS-SCNC: 17 MMOL/L (ref 7–15)
AST SERPL W P-5'-P-CCNC: 16 U/L (ref 0–45)
BASOPHILS # BLD AUTO: 0 10E3/UL (ref 0–0.2)
BASOPHILS NFR BLD AUTO: 0 %
BILIRUB SERPL-MCNC: 0.4 MG/DL
BUN SERPL-MCNC: 14.8 MG/DL (ref 6–20)
CALCIUM SERPL-MCNC: 9.6 MG/DL (ref 8.6–10)
CHLORIDE SERPL-SCNC: 101 MMOL/L (ref 98–107)
CREAT SERPL-MCNC: 0.68 MG/DL (ref 0.51–0.95)
DEPRECATED HCO3 PLAS-SCNC: 22 MMOL/L (ref 22–29)
EGFRCR SERPLBLD CKD-EPI 2021: >90 ML/MIN/1.73M2
EOSINOPHIL # BLD AUTO: 0 10E3/UL (ref 0–0.7)
EOSINOPHIL NFR BLD AUTO: 0 %
ERYTHROCYTE [DISTWIDTH] IN BLOOD BY AUTOMATED COUNT: 13.3 % (ref 10–15)
GLUCOSE SERPL-MCNC: 170 MG/DL (ref 70–99)
HCT VFR BLD AUTO: 39.4 % (ref 35–47)
HGB BLD-MCNC: 12.8 G/DL (ref 11.7–15.7)
IMM GRANULOCYTES # BLD: 0.2 10E3/UL
IMM GRANULOCYTES NFR BLD: 2 %
LYMPHOCYTES # BLD AUTO: 1.4 10E3/UL (ref 0.8–5.3)
LYMPHOCYTES NFR BLD AUTO: 10 %
MCH RBC QN AUTO: 30.1 PG (ref 26.5–33)
MCHC RBC AUTO-ENTMCNC: 32.5 G/DL (ref 31.5–36.5)
MCV RBC AUTO: 93 FL (ref 78–100)
MONOCYTES # BLD AUTO: 0.9 10E3/UL (ref 0–1.3)
MONOCYTES NFR BLD AUTO: 7 %
NEUTROPHILS # BLD AUTO: 10.9 10E3/UL (ref 1.6–8.3)
NEUTROPHILS NFR BLD AUTO: 81 %
NRBC # BLD AUTO: 0 10E3/UL
NRBC BLD AUTO-RTO: 0 /100
PLATELET # BLD AUTO: 225 10E3/UL (ref 150–450)
POTASSIUM SERPL-SCNC: 3.7 MMOL/L (ref 3.4–5.3)
PROT SERPL-MCNC: 6.7 G/DL (ref 6.4–8.3)
RBC # BLD AUTO: 4.25 10E6/UL (ref 3.8–5.2)
SODIUM SERPL-SCNC: 140 MMOL/L (ref 136–145)
WBC # BLD AUTO: 13.4 10E3/UL (ref 4–11)

## 2023-09-25 PROCEDURE — G0378 HOSPITAL OBSERVATION PER HR: HCPCS

## 2023-09-25 PROCEDURE — 250N000013 HC RX MED GY IP 250 OP 250 PS 637: Performed by: NURSE PRACTITIONER

## 2023-09-25 PROCEDURE — 99239 HOSP IP/OBS DSCHRG MGMT >30: CPT | Performed by: NURSE PRACTITIONER

## 2023-09-25 PROCEDURE — 999N000157 HC STATISTIC RCP TIME EA 10 MIN

## 2023-09-25 PROCEDURE — 85025 COMPLETE CBC W/AUTO DIFF WBC: CPT | Performed by: NURSE PRACTITIONER

## 2023-09-25 PROCEDURE — 250N000009 HC RX 250: Performed by: PEDIATRICS

## 2023-09-25 PROCEDURE — 94640 AIRWAY INHALATION TREATMENT: CPT | Mod: 76

## 2023-09-25 PROCEDURE — 250N000012 HC RX MED GY IP 250 OP 636 PS 637: Performed by: PEDIATRICS

## 2023-09-25 PROCEDURE — 36415 COLL VENOUS BLD VENIPUNCTURE: CPT | Performed by: NURSE PRACTITIONER

## 2023-09-25 PROCEDURE — 80053 COMPREHEN METABOLIC PANEL: CPT | Performed by: NURSE PRACTITIONER

## 2023-09-25 RX ORDER — GUAIFENESIN 600 MG/1
600 TABLET, EXTENDED RELEASE ORAL 2 TIMES DAILY
Qty: 60 TABLET | Refills: 0 | Status: SHIPPED | OUTPATIENT
Start: 2023-09-25 | End: 2023-10-25

## 2023-09-25 RX ORDER — IPRATROPIUM BROMIDE AND ALBUTEROL SULFATE 2.5; .5 MG/3ML; MG/3ML
3 SOLUTION RESPIRATORY (INHALATION)
Status: DISCONTINUED | OUTPATIENT
Start: 2023-09-25 | End: 2023-09-25 | Stop reason: HOSPADM

## 2023-09-25 RX ORDER — IPRATROPIUM BROMIDE AND ALBUTEROL SULFATE 2.5; .5 MG/3ML; MG/3ML
3 SOLUTION RESPIRATORY (INHALATION) 4 TIMES DAILY
Qty: 1080 ML | Refills: 0 | Status: SHIPPED | OUTPATIENT
Start: 2023-09-25 | End: 2024-03-04

## 2023-09-25 RX ORDER — GUAIFENESIN 600 MG/1
600 TABLET, EXTENDED RELEASE ORAL 2 TIMES DAILY
Status: DISCONTINUED | OUTPATIENT
Start: 2023-09-25 | End: 2023-09-25 | Stop reason: HOSPADM

## 2023-09-25 RX ORDER — FLUTICASONE PROPIONATE AND SALMETEROL 250; 50 UG/1; UG/1
1 POWDER RESPIRATORY (INHALATION) 2 TIMES DAILY
Qty: 3 EACH | Refills: 3 | Status: SHIPPED | OUTPATIENT
Start: 2023-09-25 | End: 2024-03-04

## 2023-09-25 RX ORDER — ALBUTEROL SULFATE 90 UG/1
2 AEROSOL, METERED RESPIRATORY (INHALATION) EVERY 4 HOURS PRN
Qty: 18 G | Refills: 1 | Status: SHIPPED | OUTPATIENT
Start: 2023-09-25 | End: 2024-03-04

## 2023-09-25 RX ORDER — ALBUTEROL SULFATE 0.83 MG/ML
2.5 SOLUTION RESPIRATORY (INHALATION) EVERY 6 HOURS PRN
Status: DISCONTINUED | OUTPATIENT
Start: 2023-09-25 | End: 2023-09-25 | Stop reason: HOSPADM

## 2023-09-25 RX ADMIN — ALBUTEROL SULFATE 2.5 MG: 2.5 SOLUTION RESPIRATORY (INHALATION) at 13:07

## 2023-09-25 RX ADMIN — PREDNISONE 40 MG: 20 TABLET ORAL at 09:45

## 2023-09-25 RX ADMIN — ALBUTEROL SULFATE 2.5 MG: 2.5 SOLUTION RESPIRATORY (INHALATION) at 04:30

## 2023-09-25 RX ADMIN — IPRATROPIUM BROMIDE AND ALBUTEROL SULFATE 3 ML: 2.5; .5 SOLUTION RESPIRATORY (INHALATION) at 06:36

## 2023-09-25 RX ADMIN — IPRATROPIUM BROMIDE AND ALBUTEROL SULFATE 3 ML: 2.5; .5 SOLUTION RESPIRATORY (INHALATION) at 10:28

## 2023-09-25 RX ADMIN — GUAIFENESIN 600 MG: 600 TABLET ORAL at 09:45

## 2023-09-25 RX ADMIN — ALBUTEROL SULFATE 2.5 MG: 2.5 SOLUTION RESPIRATORY (INHALATION) at 08:24

## 2023-09-25 ASSESSMENT — ACTIVITIES OF DAILY LIVING (ADL)
ADLS_ACUITY_SCORE: 31

## 2023-09-25 NOTE — DISCHARGE INSTRUCTIONS
The clinic will call you to schedule your hospital follow up appointment with Kandace Rodriguez. If you do not hear from them by 9/27, please call [phone].

## 2023-09-25 NOTE — PROGRESS NOTES
PRIMARY DIAGNOSIS: ASTHMA  OUTPATIENT/OBSERVATION GOALS TO BE MET BEFORE DISCHARGE:  1. Vital signs stable: Yes    2. Improvement of peak flow to greater than 70% sustained off nebulizer for 4 hours: No    3. Dyspnea improved and O2 sats >88% at RA or at prior home O2 therapy level: Yes      SpO2: 93 %, O2 Device: None (Room air)    4. Short term supplemental O2 needed for use with activity at home: No    5. Tolerating adequate PO diet and medications: Yes    6. Return to near baseline physical activity: Yes    Discharge Planner Nurse   Safe discharge environment identified: Yes  Barriers to discharge: No       Entered by: Evelia Rodriguez RN 09/25/2023 6:10 PM     Please review provider order for any additional goals.   Nurse to notify provider when observation goals have been met and patient is ready for discharge.

## 2023-09-25 NOTE — PROGRESS NOTES
Patient has been on room air throughout the day and doing well. Wheezy at times but improves with nebs. She has been receiving alternating Duoneb and albuterol Q2 and after talking with the NP we will be changing to Q4 while awake and if patient needs a prn in between to please call. She still gets short of breath with activity but feels much better and at rest feels okay.   RT will continue to follow and assess.

## 2023-09-25 NOTE — PROGRESS NOTES
"PRIMARY DIAGNOSIS: \"GENERIC\" NURSING  OUTPATIENT/OBSERVATION GOALS TO BE MET BEFORE DISCHARGE:  ADLs back to baseline: Yes    Activity and level of assistance: Up with standby assistance.    Pain status: Improved-controlled with oral pain medications.    Return to near baseline physical activity: Yes     Discharge Planner Nurse   Safe discharge environment identified:  Pending  Barriers to discharge:  Pending       Entered by: Sarah Beyer RN 09/25/2023 3:10 AM     Please review provider order for any additional goals.   Nurse to notify provider when observation goals have been met and patient is ready for discharge.  "

## 2023-09-25 NOTE — PROGRESS NOTES
"PRIMARY DIAGNOSIS: \"GENERIC\" NURSING  OUTPATIENT/OBSERVATION GOALS TO BE MET BEFORE DISCHARGE:  ADLs back to baseline: Yes    Activity and level of assistance: Up with standby assistance.    Pain status: Improved-controlled with oral pain medications.    Return to near baseline physical activity: Yes     Discharge Planner Nurse   Safe discharge environment identified:  Pending  Barriers to discharge:  Pending       Entered by: Sarah Beyer RN 09/25/2023 2:41 AM     Please review provider order for any additional goals.   Nurse to notify provider when observation goals have been met and patient is ready for discharge.  "

## 2023-09-25 NOTE — PROGRESS NOTES
S-(situation): Patient discharged to home via w/c with .    B-(background): Observation goals met     A-(assessment): VSS. Up ind in halls. Dyspneic on exertion, recovers at rest. Lungs CTA, exp wheezing noted. Denies pain.     R-(recommendations): Discharge instructions reviewed with pt. Listed belongings gathered and returned to patient.  Patient Education resolved: Yes  New medications-Pt. Has been educated about reason of use and side effects Yes  Home medications returned to patient Yes  Medication Bin checked and emptied on discharge Yes

## 2023-09-25 NOTE — DISCHARGE SUMMARY
[facility]    Discharge Summary  Hospital Medicine    Date of Admission:  9/24/2023  Date of Discharge:  9/25/2023   Discharging Provider: ANTONIO JONES CNP  Date of Service: 9/25/2023      Primary Care     Kandace Rodriguez  [address] [place] [address]      Identification and Chief Compaint: Vy Villalobos is a 58 year old female who presented on 9/24/2023 with complaint of worsening shortness of breath, positional nocturnal dyspnea.  Failed outpatient therapy.    Discharge Diagnoses       Mild intermittent asthma with acute exacerbation    Morbid obesity (H)    Hyperlipidemia LDL goal <100    Hypertension goal BP (blood pressure) < 140/90    Respiratory distress    Hypoxia    * No resolved hospital problems. *          Discharge Disposition   Discharged to home    Discharge Orders   No discharge procedures on file.      Further instructions from your care team         The clinic will call you to schedule your hospital follow up appointment with Kandace Rodriguez. If you do not hear from them by 9/27, please call [phone].         Discharge Medications   Current Discharge Medication List        CONTINUE these medications which have NOT CHANGED    Details   albuterol (PROAIR HFA/PROVENTIL HFA/VENTOLIN HFA) 108 (90 Base) MCG/ACT inhaler Inhale 2 puffs into the lungs every 6 hours as needed for shortness of breath  Qty: 18 g, Refills: 1    Comments: Pharmacy may dispense brand covered by insurance (Proair, or proventil or ventolin or generic albuterol inhaler)  Associated Diagnoses: Mild intermittent asthma, uncomplicated      CALCIUM-VITAMIN D PO Take 1 tablet by mouth daily daily      ipratropium - albuterol 0.5 mg/2.5 mg/3 mL (DUONEB) 0.5-2.5 (3) MG/3ML neb solution TAKE 1 VIAL (3 MLS) BY NEBULIZATION EVERY 6 HOURS AS NEEDED FOR SHORTN ESS OF BREATH / DYSPNEA  Qty: 180 mL, Refills: 4    Associated Diagnoses: Mild intermittent asthma, uncomplicated      losartan (COZAAR)  25 MG tablet TAKE ONE-HALF TABLET BY MOUTH DAILY  Qty: 45 tablet, Refills: 3    Associated Diagnoses: Microalbuminuria; Benign essential hypertension      metoprolol succinate ER (TOPROL XL) 100 MG 24 hr tablet Take 1 tablet (100 mg) by mouth daily  Qty: 90 tablet, Refills: 3    Associated Diagnoses: Hypertension goal BP (blood pressure) < 140/90      Multiple Vitamins-Minerals (CENTRUM PO) Take 1 tablet by mouth daily      predniSONE (DELTASONE) 20 MG tablet Take 40 mg by mouth daily      sertraline (ZOLOFT) 50 MG tablet Take 1.5 tablets (75 mg) by mouth daily  Qty: 90 tablet, Refills: 3    Comments: Please fill at 75mg dose and not 50mg  Associated Diagnoses: Major depressive disorder, recurrent episode, in full remission (H24); SYLVIA (generalized anxiety disorder)      simvastatin (ZOCOR) 40 MG tablet Take 1 tablet (40 mg) by mouth daily  Qty: 90 tablet, Refills: 3    Associated Diagnoses: Hyperlipidemia LDL goal <100      VITAMIN C, CALCIUM ASCORBATE, PO Take  by mouth. daily           Allergies   Allergies   Allergen Reactions    Doxycycline Itching    Miconazole Rash       Consultations This Hospital Stay   No consultations were requested during this admission    None    Significant Results and Procedures   Procedures  None    Data   Results for orders placed or performed during the hospital encounter of 09/24/23   Chest XR,  PA & LAT    Narrative    EXAM: XR CHEST 2 VIEWS  LOCATION: [facility]  DATE: 9/24/2023    INDICATION: Asthma exacerbation, shortness of breath  COMPARISON: Chest radiograph 01/15/2013.      Impression    IMPRESSION: Negative chest.       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  She started feeling more short of breath within the last week and was using her rescue inhaler  more frequently without improvement.  She presented to urgent care for evaluation on September 20 and was prescribed 5 days of prednisone, DuoNebs, and albuterol.  She has been using those medications at home over the last few days, but her dyspnea and wheezing have persisted.  Dyspnea is worse with activity, and better at rest although not relieved by rest. She has not been able to sleep at night because of worsening dyspnea and has had to sleep sitting upright which is new.  She has felt lightheaded and dizzy like she might faint.  Neither she nor her spouse have noted cyanosis.  Overall symptoms have been worsening the last few days, so she presented to the ER for evaluation today.  In the ER, Dr. Ashton reports that she appeared to be in respiratory distress.  She was treated in the ER today with doses of IV Solu-Medrol, DuoNeb, and albuterol neb.  Oxygen saturations were intermittently low in the ER, but she has not started oxygen supplementation.  After ER treatment today, she said she felt somewhat better.     Hospital Course     Vy Villalobos is a 58 year old woman with intermittent asthma, hypertension, hyperlipidemia, and obesity presenting with 1 week history of worsening shortness of breath and wheezing despite treatment over the last few days with corticosteroids and bronchodilators.  She presented to the ER with respiratory distress today and was intermittently severely hypoxic in the ER.  After treatment in the ER including corticosteroids and bronchodilators, she is improved but continues to be symptomatic with dyspnea, wheezing, and signs of respiratory distress.  She is at high risk for an adverse outcome including worsening asthma exacerbation, respiratory failure, and death, so ongoing hospitalization is considered medically necessary.  Based on the presently available information, hospitalization for about 24 hours is anticipated.       Mild intermittent asthma with acute  exacerbation  Respiratory distress  Hypoxia  Has not recently used maintenance treatment for asthma and rarely uses her rescue inhaler normally.  She has been prescribed maintenance inhaler therapy in the past but did not use it because the brand she was prescribed was not covered by insurance.  Over the course of the week this week, she had worsening dyspnea and wheezing as well as URI symptoms which is the likely trigger for asthma exacerbation.  Symptoms worsened despite treatment with bronchodilators and corticosteroids.  In the ER she demonstrated signs of respiratory distress and was intermittently severely hypoxic with oxygen saturation as low as 89% in room air.  She was treated in the ER today with IV Solu-Medrol, DuoNeb, and albuterol nebulization but continues to feel dyspneic with signs of respiratory distress and wheezing.  -[place] to observation  -Continue corticosteroids but will switch to oral prednisone  -Continue bronchodilators by nebulization every 2 hours alternating between DuoNeb every 4 hours and albuterol every 4 hours  -Monitor respiratory effort and pulse oximetry closely with low threshold to add oxygen supplementation  -Ordered ABG to assess not only for hypoxia but also respiratory acidosis and risk stratification of her asthma exacerbation  -Ordered PCR testing for COVID-19 as well as influenza and RSV and would consider additional antiviral therapies if those results are positive as indicated  -Discussed starting maintenance inhaler therapy for asthma with which she was agreeable because of the severity of her current illness and the fact that 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  However, tachypnea is probably due to asthma exacerbation and leukocytosis is probably due to recent use of corticosteroids.  Bacterial infection and sepsis are not suspected.  -Monitor clinically for signs of infection and reconsider additional diagnostic evaluation and/or antimicrobial therapy if there is increasing suspicion for bacterial infection     Hypertension  Chronic hypertension is treated with Toprol-XL and losartan.  Blood pressure is moderately to severely elevated today in context of acute illness including respiratory distress.  Although beta-blocker therapy could theoretically increase risk for asthma, she chronically takes Toprol-XL without regular asthma symptoms and with only infrequent need to use her rescue inhaler.  -Continue chronic doses of Toprol-XL and losartan  -Reconsider adding IV antihypertensive medication for severe hypertension if blood pressures trend upward     Hyperlipidemia  Chronic hyperlipidemia is normally treated with statin therapy.  -Continue chronic statin     Morbid obesity  Appears obese with BMI 40  -No acute intervention anticipated    Pending Results   Unresulted Labs Ordered in the Past 30 Days of this Admission       No orders found for last 31 day(s).            Physical Exam   Temp:  [97.7  F (36.5  C)-98.4  F (36.9  C)] 98  F (36.7  C)  Pulse:  [74-95] 79  Resp:  [16-19] 16  BP: (119-169)/(33-69) 130/50  SpO2:  [89 %-96 %] 91 %  Vitals:    09/24/23 1234 09/24/23 1653   Weight: 111.1 kg (245 lb) 108.3 kg (238 lb 11.2 oz)       Constitutional: Pleasant and cooperative sitting up in bed visiting with .  In no distress.  CV: Regular rate and rhythm, 2+ pulses at bilateral DP and radial  Respiratory: CTA bilaterally, no wheezes, speaking in full sentences  GI: Soft, nontender, morbidly obese  Skin: Warm and  dry  Musculoskeletal: No gross limb abnormalities, ambulates at baseline.  Neurological: Oriented x3, cranial nerves appear intact, no neurological deficits noted    The discharge plan was discussed with the patient and her     Total time on this discharge was 40 minutes.    ANTONIO JONES CNP

## 2023-09-25 NOTE — PROGRESS NOTES
PRIMARY DIAGNOSIS: ASTHMA  OUTPATIENT/OBSERVATION GOALS TO BE MET BEFORE DISCHARGE:  1. Vital signs stable: Yes    2. Dyspnea improved and O2 sats >88% at RA or at prior home O2 therapy level: Yes      SpO2: 96 %, O2 Device: None (Room air)    4. Short term supplemental O2 needed for use with activity at home:  N/A    5. Tolerating adequate PO diet and medications: Yes    6. Return to near baseline physical activity: Yes    Discharge Planner Nurse   Safe discharge environment identified:  Pending  Barriers to discharge:  Pending       Entered by: Sarah Beyer RN 09/25/2023 5:07 AM   Pt A/Ox4. VVS on RA. O2 continues to be above 90%. Labored breathing. Continues to have expiratory wheezing with improvement of nebs. Given PRN Tylenol in the evening due to a headache. SBA.   Please review provider order for any additional goals.   Nurse to notify provider when observation goals have been met and patient is ready for discharge.

## 2023-09-26 ENCOUNTER — PATIENT OUTREACH (OUTPATIENT)
Dept: CARE COORDINATION | Facility: CLINIC | Age: 58
End: 2023-09-26
Payer: COMMERCIAL

## 2023-09-26 DIAGNOSIS — J45.20 MILD INTERMITTENT ASTHMA, UNCOMPLICATED: Primary | ICD-10-CM

## 2023-09-26 DIAGNOSIS — J45.41 MODERATE PERSISTENT ASTHMA WITH EXACERBATION: ICD-10-CM

## 2023-09-26 RX ORDER — PREDNISONE 20 MG/1
40 TABLET ORAL DAILY
Qty: 10 TABLET | Refills: 0 | Status: SHIPPED | OUTPATIENT
Start: 2023-09-26 | End: 2023-10-01

## 2023-09-26 NOTE — PROGRESS NOTES
[facility]    Background: Transitional Care Management program identified per system criteria and reviewed by [facility] team for possible outreach.    Assessment: Upon chart review, CCR Team member will not proceed with patient outreach related to this episode of Transitional Care Management program due to reason below:    Patient has active communication with a nurse, provider or care team for reason of post-hospital follow up plan.  Outreach call by CCR team not indicated to minimize duplicative efforts.     Per chart review, patient has been in contact with clinic RN today (please see Refill Encounter dated 9/26/2023 for further details).     Plan: Transitional Care Management episode addressed appropriately per reason noted above.      Ann Burkett RN  [facility] [place]    *Connected Care Resource Team does NOT follow patient ongoing. Referrals are identified based on internal discharge reports and the outreach is to ensure patient has an understanding of their discharge instructions.

## 2023-09-26 NOTE — TELEPHONE ENCOUNTER
This is not a medication that is taken daily- please find out why she needs a refill.  Kandace Rodriguez, CNP

## 2023-09-26 NOTE — TELEPHONE ENCOUNTER
Spoke with patient and she was confused if she was supposed to have another round of it after her discharge from the hospital. It was not ordered on her discharge paperwork. She is currently using Mucinex, Albuterol, and Advair with relief.   She reports she is feeling better. Do you want her to do another round of Prednisone?       Artem Hess,CHARLESN, RN

## 2023-09-29 ASSESSMENT — ASTHMA QUESTIONNAIRES: ACT_TOTALSCORE: 5

## 2023-10-06 ENCOUNTER — MYC MEDICAL ADVICE (OUTPATIENT)
Dept: FAMILY MEDICINE | Facility: CLINIC | Age: 58
End: 2023-10-06

## 2023-10-06 ENCOUNTER — OFFICE VISIT (OUTPATIENT)
Dept: FAMILY MEDICINE | Facility: CLINIC | Age: 58
End: 2023-10-06
Payer: COMMERCIAL

## 2023-10-06 VITALS
RESPIRATION RATE: 16 BRPM | SYSTOLIC BLOOD PRESSURE: 150 MMHG | TEMPERATURE: 98.2 F | WEIGHT: 241.6 LBS | OXYGEN SATURATION: 97 % | BODY MASS INDEX: 41.25 KG/M2 | HEART RATE: 71 BPM | HEIGHT: 64 IN | DIASTOLIC BLOOD PRESSURE: 100 MMHG

## 2023-10-06 DIAGNOSIS — J45.41 MODERATE PERSISTENT ASTHMA WITH ACUTE EXACERBATION: Primary | Chronic | ICD-10-CM

## 2023-10-06 DIAGNOSIS — I10 BENIGN ESSENTIAL HYPERTENSION: ICD-10-CM

## 2023-10-06 DIAGNOSIS — B37.0 CANDIDIASIS OF MOUTH: ICD-10-CM

## 2023-10-06 PROBLEM — J45.21 MILD INTERMITTENT ASTHMA WITH ACUTE EXACERBATION: Status: RESOLVED | Noted: 2017-01-23 | Resolved: 2023-10-06

## 2023-10-06 PROCEDURE — 99495 TRANSJ CARE MGMT MOD F2F 14D: CPT | Performed by: NURSE PRACTITIONER

## 2023-10-06 RX ORDER — NYSTATIN 100000/ML
500000 SUSPENSION, ORAL (FINAL DOSE FORM) ORAL 4 TIMES DAILY
Qty: 280 ML | Refills: 0 | Status: SHIPPED | OUTPATIENT
Start: 2023-10-06 | End: 2023-10-20

## 2023-10-06 ASSESSMENT — PATIENT HEALTH QUESTIONNAIRE - PHQ9
10. IF YOU CHECKED OFF ANY PROBLEMS, HOW DIFFICULT HAVE THESE PROBLEMS MADE IT FOR YOU TO DO YOUR WORK, TAKE CARE OF THINGS AT HOME, OR GET ALONG WITH OTHER PEOPLE: NOT DIFFICULT AT ALL
SUM OF ALL RESPONSES TO PHQ QUESTIONS 1-9: 1
SUM OF ALL RESPONSES TO PHQ QUESTIONS 1-9: 1

## 2023-10-06 ASSESSMENT — PAIN SCALES - GENERAL: PAINLEVEL: NO PAIN (0)

## 2023-10-06 NOTE — PROGRESS NOTES
"  Assessment & Plan     Moderate persistent asthma with acute exacerbation  Recently observation at [facility] reviewed.  Continue advair. Completed prednisone.  Add zyrtec- gets nose bleeds from flonase.  Consider singulair if still needing allergy control after zyrtec.     Candidiasis of mouth  Oral thrush- discussed importance of rinsing after advair use.   - nystatin (MYCOSTATIN) 243250 UNIT/ML suspension; Take 5 mLs (500,000 Units) by mouth 4 times daily for 14 days    HTN:   Is only taking 12.5 of her cozaar at night other wise she reports occassional dizziness.  She will try bid and let me know if not improving.     35 minutes spent by me on the date of the encounter doing chart review, review of test results, interpretation of tests, patient visit, and documentation      MED REC REQUIRED  Post Medication Reconciliation Status:  Discharge medications reconciled, continue medications without change  BMI:   Estimated body mass index is 41.47 kg/m  as calculated from the following:    Height as of this encounter: 1.626 m (5' 4\").    Weight as of this encounter: 109.6 kg (241 lb 9.6 oz).   Weight management plan: Discussed healthy diet and exercise guidelines        Kandace Rodriguez, EARLINE  [facility] [place]  Will get flu shot at [employer] as gets 25cents off a gallon of gas    Ivanna Mcclellan is a 58 year old, presenting for the following health issues:  Hospital F/U (Observation stay, nebulizer every 2 hours )      10/6/2023     8:13 AM   Additional Questions   Roomed by Fernanda LR       Hospital Follow-up Visit:    Hospital/Nursing Home/IP Rehab Facility: [facility]  Date of Admission: 10/1/2023  Date of Discharge: 10/2/2023  Reason(s) for Admission: trouble breathing    Was your hospitalization related to COVID-19? No   Problems taking medications regularly:  None  Medication changes since discharge: yes disk inhaler and mucinex and predisone   Problems adhering " "to non-medication therapy:  None    Summary of hospitalization:  [facility] hospital discharge summary reviewed  Diagnostic Tests/Treatments reviewed.  Follow up needed: none  Other Healthcare Providers Involved in Patient s Care:          follow up with provider  Update since discharge: improved.       That Thursday at school- [employer]- brought nebs- and went to urgent care- put her on steroids- double neb- usually works better in 3 days.  Could not catch breath- wheezing terrible/ cough.  She is not sure cold/ allergies/     She does feel like she has seasonal allergies- dust/ mold.  Gets bloody noses from flonase    Finished steroid burst- feeling a lot better.  No further wheezing.  Cough- dry- tickle- improved.  Energy improving. Never smoking one else at home was sick    Plan of care communicated with patient                 Review of Systems         Objective    BP (!) 148/76 (Cuff Size: Adult Large)   Pulse 71   Temp 98.2  F (36.8  C) (Temporal)   Resp 16   Ht 1.626 m (5' 4\")   Wt 109.6 kg (241 lb 9.6 oz)   LMP 02/19/2017   SpO2 97%   BMI 41.47 kg/m    Body mass index is 41.47 kg/m .  Physical Exam   GENERAL: healthy, alert and no distress  EYES: Eyes grossly normal to inspection, PERRL and conjunctivae and sclerae normal  HENT: ear canals and TM's normal, nose and mouth without ulcers or lesions  NECK: no adenopathy, no asymmetry, masses, or scars and thyroid normal to palpation  RESP: lungs clear to auscultation - no rales, rhonchi or wheezes  BREAST: normal without masses, tenderness or nipple discharge and no palpable axillary masses or adenopathy  CV: regular rate and rhythm, normal S1 S2, no S3 or S4, no murmur, click or rub, no peripheral edema and peripheral pulses strong  ABDOMEN: soft, nontender, no hepatosplenomegaly, no masses and bowel sounds normal  MS: no gross musculoskeletal defects noted, no edema  SKIN: no suspicious lesions or rashes  NEURO: Normal strength and tone, " mentation intact and speech normal  PSYCH: mentation appears normal, affect normal/bright              .undefined[^^Answers submitted by the patient for this visit:  Patient Health Questionnaire (Submitted on 10/6/2023)  If you checked off any problems, how difficult have these problems made it for you to do your work, take care of things at home, or get along with other people?: Not difficult at all  PHQ9 TOTAL SCORE: 1

## 2023-11-05 DIAGNOSIS — F33.42 MAJOR DEPRESSIVE DISORDER, RECURRENT EPISODE, IN FULL REMISSION (H): ICD-10-CM

## 2023-11-05 DIAGNOSIS — F41.1 GAD (GENERALIZED ANXIETY DISORDER): ICD-10-CM

## 2023-12-15 ENCOUNTER — TELEPHONE (OUTPATIENT)
Dept: FAMILY MEDICINE | Facility: CLINIC | Age: 58
End: 2023-12-15
Payer: COMMERCIAL

## 2023-12-15 NOTE — TELEPHONE ENCOUNTER
I was babysitting both of my granddaughters this weekend. And they both had pink eye and they were on medication. But both my  and I have it now. Is there any way we can get medication without coming in?      Message sent to patient to submit an E-Visit with symptoms or go to a walk in urgent care clinic. Evelyn Das LPN

## 2024-01-09 ENCOUNTER — E-VISIT (OUTPATIENT)
Dept: FAMILY MEDICINE | Facility: CLINIC | Age: 59
End: 2024-01-09
Payer: COMMERCIAL

## 2024-01-09 DIAGNOSIS — Z92.29 IMMUNIZATIONS UP TO DATE: Primary | ICD-10-CM

## 2024-01-09 PROCEDURE — 99207 PR NON-BILLABLE SERV PER CHARTING: CPT | Performed by: NURSE PRACTITIONER

## 2024-02-23 ENCOUNTER — PATIENT OUTREACH (OUTPATIENT)
Dept: CARE COORDINATION | Facility: CLINIC | Age: 59
End: 2024-02-23
Payer: COMMERCIAL

## 2024-03-04 ENCOUNTER — E-VISIT (OUTPATIENT)
Dept: FAMILY MEDICINE | Facility: CLINIC | Age: 59
End: 2024-03-04
Payer: COMMERCIAL

## 2024-03-04 DIAGNOSIS — J45.41 MODERATE PERSISTENT ASTHMA WITH ACUTE EXACERBATION: Chronic | ICD-10-CM

## 2024-03-04 DIAGNOSIS — J45.20 MILD INTERMITTENT ASTHMA, UNCOMPLICATED: ICD-10-CM

## 2024-03-04 PROCEDURE — 99421 OL DIG E/M SVC 5-10 MIN: CPT | Performed by: NURSE PRACTITIONER

## 2024-03-04 RX ORDER — IPRATROPIUM BROMIDE AND ALBUTEROL SULFATE 2.5; .5 MG/3ML; MG/3ML
3 SOLUTION RESPIRATORY (INHALATION) 4 TIMES DAILY
Qty: 1080 ML | Refills: 0 | Status: SHIPPED | OUTPATIENT
Start: 2024-03-04

## 2024-03-04 RX ORDER — FLUTICASONE PROPIONATE AND SALMETEROL 250; 50 UG/1; UG/1
1 POWDER RESPIRATORY (INHALATION) 2 TIMES DAILY
Qty: 3 EACH | Refills: 3 | Status: SHIPPED | OUTPATIENT
Start: 2024-03-04 | End: 2024-05-06

## 2024-03-04 RX ORDER — ALBUTEROL SULFATE 90 UG/1
2 AEROSOL, METERED RESPIRATORY (INHALATION) EVERY 4 HOURS PRN
Qty: 18 G | Refills: 1 | Status: SHIPPED | OUTPATIENT
Start: 2024-03-04

## 2024-03-04 NOTE — PATIENT INSTRUCTIONS
Thank you for choosing us for your care. I have placed an order for a prescription so that you can start treatment. View your full visit summary for details by clicking on the link below. Your pharmacist will able to address any questions you may have about the medication.     If you're not feeling better within 5-7 days, please schedule an appointment.  You can schedule an appointment right here in [facility], or call [phone]  If the visit is for the same symptoms as your eVisit, we'll refund the cost of your eVisit if seen within seven days.

## 2024-03-05 ENCOUNTER — E-VISIT (OUTPATIENT)
Dept: FAMILY MEDICINE | Facility: CLINIC | Age: 59
End: 2024-03-05
Payer: COMMERCIAL

## 2024-03-05 DIAGNOSIS — J45.41 MODERATE PERSISTENT ASTHMA WITH ACUTE EXACERBATION: Primary | ICD-10-CM

## 2024-03-05 PROCEDURE — 99207 PR NON-BILLABLE SERV PER CHARTING: CPT | Performed by: NURSE PRACTITIONER

## 2024-03-06 NOTE — PATIENT INSTRUCTIONS
Thank you for choosing us for your care. Based on your symptoms and length of illness, I do not think that you need a prescription at this time.  Please follow the care advise I've provided and use the over the counter medications to help relieve your symptoms.   View your full visit summary for details by clicking on the link below.     If you're not feeling better within 2-3 days, please respond to this message and we can consider if a prescription is needed.  You can schedule an appointment right here in [facility], or call [phone]  If the visit is for the same symptoms as your eVisit, we'll refund the cost of your eVisit if seen within seven days.

## 2024-03-12 ENCOUNTER — TELEPHONE (OUTPATIENT)
Dept: FAMILY MEDICINE | Facility: CLINIC | Age: 59
End: 2024-03-12
Payer: COMMERCIAL

## 2024-03-12 NOTE — TELEPHONE ENCOUNTER
Reason for Call:  Appointment Request    Patient requesting this type of appt: Chronic Diease Management/Medication/Follow-Up and Preventative adult    Requested provider: Kandace Rodriguez    Reason patient unable to be scheduled: Not within requested timeframe    When does patient want to be seen/preferred time: sometime before 7/5    Comments: Vy received a message through [employer] letting her know it's time to schedule for a follow up, medication check and labs. Pt would also like to schedule her preventative (last px 3/24/2023). First available appt with pcp won't be until 7/5 and Vy was seeing if she can be seen sooner if possible. Clinic will have to call Vy to schedule an appt.    Could we send this information to you in [employer] or would you prefer to receive a phone call?:   No preference   Okay to leave a detailed message?: Yes at Cell number on file:    Telephone Information:   Mobile [phone]       Call taken on 3/12/2024 at 7:26 AM by Mindy Snow

## 2024-04-07 DIAGNOSIS — F33.42 MAJOR DEPRESSIVE DISORDER, RECURRENT EPISODE, IN FULL REMISSION (H): ICD-10-CM

## 2024-04-07 DIAGNOSIS — F41.1 GAD (GENERALIZED ANXIETY DISORDER): ICD-10-CM

## 2024-04-15 DIAGNOSIS — F33.42 MAJOR DEPRESSIVE DISORDER, RECURRENT EPISODE, IN FULL REMISSION (H): ICD-10-CM

## 2024-04-15 DIAGNOSIS — F41.1 GAD (GENERALIZED ANXIETY DISORDER): ICD-10-CM

## 2024-05-06 ENCOUNTER — OFFICE VISIT (OUTPATIENT)
Dept: FAMILY MEDICINE | Facility: CLINIC | Age: 59
End: 2024-05-06
Payer: COMMERCIAL

## 2024-05-06 ENCOUNTER — HOSPITAL ENCOUNTER (OUTPATIENT)
Dept: MAMMOGRAPHY | Facility: CLINIC | Age: 59
Discharge: HOME | End: 2024-05-06
Attending: NURSE PRACTITIONER | Admitting: NURSE PRACTITIONER
Payer: COMMERCIAL

## 2024-05-06 VITALS
WEIGHT: 238 LBS | OXYGEN SATURATION: 98 % | BODY MASS INDEX: 39.65 KG/M2 | HEIGHT: 65 IN | DIASTOLIC BLOOD PRESSURE: 98 MMHG | RESPIRATION RATE: 18 BRPM | TEMPERATURE: 97.1 F | SYSTOLIC BLOOD PRESSURE: 150 MMHG | HEART RATE: 65 BPM

## 2024-05-06 DIAGNOSIS — L60.3 BRITTLE NAILS: ICD-10-CM

## 2024-05-06 DIAGNOSIS — F41.1 GAD (GENERALIZED ANXIETY DISORDER): ICD-10-CM

## 2024-05-06 DIAGNOSIS — E78.5 HYPERLIPIDEMIA LDL GOAL <100: ICD-10-CM

## 2024-05-06 DIAGNOSIS — I10 BENIGN ESSENTIAL HYPERTENSION: ICD-10-CM

## 2024-05-06 DIAGNOSIS — Z12.31 VISIT FOR SCREENING MAMMOGRAM: ICD-10-CM

## 2024-05-06 DIAGNOSIS — J45.41 MODERATE PERSISTENT ASTHMA WITH ACUTE EXACERBATION: ICD-10-CM

## 2024-05-06 DIAGNOSIS — Z00.00 ROUTINE GENERAL MEDICAL EXAMINATION AT A HEALTH CARE FACILITY: Primary | ICD-10-CM

## 2024-05-06 DIAGNOSIS — F33.42 MAJOR DEPRESSIVE DISORDER, RECURRENT EPISODE, IN FULL REMISSION (H): ICD-10-CM

## 2024-05-06 DIAGNOSIS — R73.09 ELEVATED GLUCOSE: ICD-10-CM

## 2024-05-06 DIAGNOSIS — E66.01 MORBID OBESITY (H): ICD-10-CM

## 2024-05-06 DIAGNOSIS — Z12.11 SPECIAL SCREENING FOR MALIGNANT NEOPLASMS, COLON: ICD-10-CM

## 2024-05-06 DIAGNOSIS — R80.9 MICROALBUMINURIA: ICD-10-CM

## 2024-05-06 PROBLEM — R09.02 HYPOXIA: Status: RESOLVED | Noted: 2023-09-24 | Resolved: 2024-05-06

## 2024-05-06 LAB
ANION GAP SERPL CALCULATED.3IONS-SCNC: 12 MMOL/L (ref 7–15)
BASOPHILS # BLD AUTO: 0.1 10E3/UL (ref 0–0.2)
BASOPHILS NFR BLD AUTO: 1 %
BUN SERPL-MCNC: 11.1 MG/DL (ref 6–20)
CALCIUM SERPL-MCNC: 9.9 MG/DL (ref 8.6–10)
CHLORIDE SERPL-SCNC: 101 MMOL/L (ref 98–107)
CHOLEST SERPL-MCNC: 184 MG/DL
CREAT SERPL-MCNC: 0.72 MG/DL (ref 0.51–0.95)
DEPRECATED HCO3 PLAS-SCNC: 23 MMOL/L (ref 22–29)
EGFRCR SERPLBLD CKD-EPI 2021: >90 ML/MIN/1.73M2
EOSINOPHIL # BLD AUTO: 0.2 10E3/UL (ref 0–0.7)
EOSINOPHIL NFR BLD AUTO: 3 %
ERYTHROCYTE [DISTWIDTH] IN BLOOD BY AUTOMATED COUNT: 13.2 % (ref 10–15)
FASTING STATUS PATIENT QL REPORTED: YES
FERRITIN SERPL-MCNC: 278 NG/ML (ref 11–328)
GLUCOSE SERPL-MCNC: 122 MG/DL (ref 70–99)
HCT VFR BLD AUTO: 42.3 % (ref 35–47)
HDLC SERPL-MCNC: 48 MG/DL
HGB BLD-MCNC: 13.9 G/DL (ref 11.7–15.7)
IMM GRANULOCYTES # BLD: 0 10E3/UL
IMM GRANULOCYTES NFR BLD: 0 %
LDLC SERPL CALC-MCNC: 105 MG/DL
LYMPHOCYTES # BLD AUTO: 1.5 10E3/UL (ref 0.8–5.3)
LYMPHOCYTES NFR BLD AUTO: 26 %
MCH RBC QN AUTO: 30 PG (ref 26.5–33)
MCHC RBC AUTO-ENTMCNC: 32.9 G/DL (ref 31.5–36.5)
MCV RBC AUTO: 91 FL (ref 78–100)
MONOCYTES # BLD AUTO: 0.5 10E3/UL (ref 0–1.3)
MONOCYTES NFR BLD AUTO: 9 %
NEUTROPHILS # BLD AUTO: 3.5 10E3/UL (ref 1.6–8.3)
NEUTROPHILS NFR BLD AUTO: 61 %
NONHDLC SERPL-MCNC: 136 MG/DL
NRBC # BLD AUTO: 0 10E3/UL
NRBC BLD AUTO-RTO: 0 /100
PLATELET # BLD AUTO: 180 10E3/UL (ref 150–450)
POTASSIUM SERPL-SCNC: 4.2 MMOL/L (ref 3.4–5.3)
RBC # BLD AUTO: 4.63 10E6/UL (ref 3.8–5.2)
SODIUM SERPL-SCNC: 136 MMOL/L (ref 135–145)
TRIGL SERPL-MCNC: 155 MG/DL
VIT B12 SERPL-MCNC: 666 PG/ML (ref 232–1245)
VIT D+METAB SERPL-MCNC: 37 NG/ML (ref 20–50)
WBC # BLD AUTO: 5.7 10E3/UL (ref 4–11)

## 2024-05-06 PROCEDURE — 83036 HEMOGLOBIN GLYCOSYLATED A1C: CPT | Performed by: NURSE PRACTITIONER

## 2024-05-06 PROCEDURE — 80048 BASIC METABOLIC PNL TOTAL CA: CPT | Performed by: NURSE PRACTITIONER

## 2024-05-06 PROCEDURE — 80061 LIPID PANEL: CPT | Performed by: NURSE PRACTITIONER

## 2024-05-06 PROCEDURE — 36415 COLL VENOUS BLD VENIPUNCTURE: CPT | Performed by: NURSE PRACTITIONER

## 2024-05-06 PROCEDURE — 82607 VITAMIN B-12: CPT | Performed by: NURSE PRACTITIONER

## 2024-05-06 PROCEDURE — 99214 OFFICE O/P EST MOD 30 MIN: CPT | Mod: 25 | Performed by: NURSE PRACTITIONER

## 2024-05-06 PROCEDURE — 96127 BRIEF EMOTIONAL/BEHAV ASSMT: CPT | Performed by: NURSE PRACTITIONER

## 2024-05-06 PROCEDURE — 77063 BREAST TOMOSYNTHESIS BI: CPT

## 2024-05-06 PROCEDURE — 85025 COMPLETE CBC W/AUTO DIFF WBC: CPT | Performed by: NURSE PRACTITIONER

## 2024-05-06 PROCEDURE — 99396 PREV VISIT EST AGE 40-64: CPT | Performed by: NURSE PRACTITIONER

## 2024-05-06 PROCEDURE — 82274 ASSAY TEST FOR BLOOD FECAL: CPT | Performed by: NURSE PRACTITIONER

## 2024-05-06 PROCEDURE — 82728 ASSAY OF FERRITIN: CPT | Performed by: NURSE PRACTITIONER

## 2024-05-06 PROCEDURE — 82306 VITAMIN D 25 HYDROXY: CPT | Performed by: NURSE PRACTITIONER

## 2024-05-06 RX ORDER — SIMVASTATIN 40 MG
40 TABLET ORAL AT BEDTIME
Qty: 90 TABLET | Refills: 3 | Status: SHIPPED | OUTPATIENT
Start: 2024-05-06 | End: 2024-05-10

## 2024-05-06 RX ORDER — LOSARTAN POTASSIUM 25 MG/1
25 TABLET ORAL DAILY
Qty: 90 TABLET | Refills: 3 | Status: SHIPPED | OUTPATIENT
Start: 2024-05-06 | End: 2024-05-22

## 2024-05-06 RX ORDER — PREDNISONE 20 MG/1
TABLET ORAL
Qty: 20 TABLET | Refills: 0 | Status: SHIPPED | OUTPATIENT
Start: 2024-05-06

## 2024-05-06 RX ORDER — FLUTICASONE PROPIONATE AND SALMETEROL 250; 50 UG/1; UG/1
1 POWDER RESPIRATORY (INHALATION) 2 TIMES DAILY
Qty: 3 EACH | Refills: 3 | Status: SHIPPED | OUTPATIENT
Start: 2024-05-06

## 2024-05-06 RX ORDER — LOSARTAN POTASSIUM 25 MG/1
25 TABLET ORAL DAILY
Qty: 90 TABLET | Refills: 3 | Status: SHIPPED | OUTPATIENT
Start: 2024-05-06 | End: 2024-05-06

## 2024-05-06 RX ORDER — METOPROLOL SUCCINATE 100 MG/1
100 TABLET, EXTENDED RELEASE ORAL DAILY
Qty: 90 TABLET | Refills: 3 | Status: SHIPPED | OUTPATIENT
Start: 2024-05-06 | End: 2024-06-06

## 2024-05-06 SDOH — HEALTH STABILITY: PHYSICAL HEALTH: ON AVERAGE, HOW MANY DAYS PER WEEK DO YOU ENGAGE IN MODERATE TO STRENUOUS EXERCISE (LIKE A BRISK WALK)?: 5 DAYS

## 2024-05-06 SDOH — HEALTH STABILITY: PHYSICAL HEALTH: ON AVERAGE, HOW MANY MINUTES DO YOU ENGAGE IN EXERCISE AT THIS LEVEL?: 10 MIN

## 2024-05-06 ASSESSMENT — ASTHMA QUESTIONNAIRES
QUESTION_4 LAST FOUR WEEKS HOW OFTEN HAVE YOU USED YOUR RESCUE INHALER OR NEBULIZER MEDICATION (SUCH AS ALBUTEROL): THREE OR MORE TIMES PER DAY
QUESTION_3 LAST FOUR WEEKS HOW OFTEN DID YOUR ASTHMA SYMPTOMS (WHEEZING, COUGHING, SHORTNESS OF BREATH, CHEST TIGHTNESS OR PAIN) WAKE YOU UP AT NIGHT OR EARLIER THAN USUAL IN THE MORNING: FOUR OR MORE NIGHTS A WEEK
HOSPITALIZATION_OVERNIGHT_LAST_YEAR_TOTAL: ONE
ACT_TOTALSCORE: 7
EMERGENCY_ROOM_LAST_YEAR_TOTAL: ONE
QUESTION_5 LAST FOUR WEEKS HOW WOULD YOU RATE YOUR ASTHMA CONTROL: NOT CONTROLLED AT ALL
QUESTION_2 LAST FOUR WEEKS HOW OFTEN HAVE YOU HAD SHORTNESS OF BREATH: MORE THAN ONCE A DAY
ACT_TOTALSCORE: 7
QUESTION_5 LAST FOUR WEEKS HOW WOULD YOU RATE YOUR ASTHMA CONTROL: NOT CONTROLLED AT ALL
QUESTION_2 LAST FOUR WEEKS HOW OFTEN HAVE YOU HAD SHORTNESS OF BREATH: MORE THAN ONCE A DAY
QUESTION_4 LAST FOUR WEEKS HOW OFTEN HAVE YOU USED YOUR RESCUE INHALER OR NEBULIZER MEDICATION (SUCH AS ALBUTEROL): THREE OR MORE TIMES PER DAY
QUESTION_1 LAST FOUR WEEKS HOW MUCH OF THE TIME DID YOUR ASTHMA KEEP YOU FROM GETTING AS MUCH DONE AT WORK, SCHOOL OR AT HOME: SOME OF THE TIME
ACT_TOTALSCORE: 7
HOSPITALIZATION_OVERNIGHT_LAST_YEAR_TOTAL: ONE
QUESTION_3 LAST FOUR WEEKS HOW OFTEN DID YOUR ASTHMA SYMPTOMS (WHEEZING, COUGHING, SHORTNESS OF BREATH, CHEST TIGHTNESS OR PAIN) WAKE YOU UP AT NIGHT OR EARLIER THAN USUAL IN THE MORNING: FOUR OR MORE NIGHTS A WEEK
QUESTION_1 LAST FOUR WEEKS HOW MUCH OF THE TIME DID YOUR ASTHMA KEEP YOU FROM GETTING AS MUCH DONE AT WORK, SCHOOL OR AT HOME: SOME OF THE TIME
EMERGENCY_ROOM_LAST_YEAR_TOTAL: ONE

## 2024-05-06 ASSESSMENT — SOCIAL DETERMINANTS OF HEALTH (SDOH): HOW OFTEN DO YOU GET TOGETHER WITH FRIENDS OR RELATIVES?: NEVER

## 2024-05-06 ASSESSMENT — PAIN SCALES - GENERAL: PAINLEVEL: MODERATE PAIN (4)

## 2024-05-06 ASSESSMENT — PATIENT HEALTH QUESTIONNAIRE - PHQ9
SUM OF ALL RESPONSES TO PHQ QUESTIONS 1-9: 6
SUM OF ALL RESPONSES TO PHQ QUESTIONS 1-9: 6
10. IF YOU CHECKED OFF ANY PROBLEMS, HOW DIFFICULT HAVE THESE PROBLEMS MADE IT FOR YOU TO DO YOUR WORK, TAKE CARE OF THINGS AT HOME, OR GET ALONG WITH OTHER PEOPLE: SOMEWHAT DIFFICULT

## 2024-05-06 NOTE — PROGRESS NOTES
Preventive Care Visit  [facility]  Kandace Rodriguez, EARLINE, Nurse Practitioner - Family  May 6, 2024      Assessment & Plan     Routine general medical examination at a health care facility  Recommend yearly physicals    Moderate persistent asthma with acute exacerbation  Steroid taper and increase advair.  Change from Claritin to zyrtec.  Can not do flonase as gets bloody noses.  Has been going on for two weeks- discussed in future if unable to get in to do a e-visit to start steroids sooner.  Continue nebs.  Non smoker  - REVIEW OF HEALTH MAINTENANCE PROTOCOL ORDERS  - predniSONE (DELTASONE) 20 MG tablet; Take 3 tabs by mouth daily x 3 days, then 2 tabs daily x 3 days, then 1 tab daily x 3 days, then 1/2 tab daily x 3 days.  - fluticasone-salmeterol (ADVAIR) 250-50 MCG/ACT inhaler; Inhale 1 puff into the lungs 2 times daily Can increase to two puffs daily in exacerbations    Morbid obesity (H)  Recommend dietary and lifestyle changes    SYLVIA (generalized anxiety disorder)  Major depressive disorder, recurrent episode, in full remission (H24)  PHQ-9 is 6 .  Did not want any adjustment in medications.    - OR BEHAV ASSMT W/SCORE & DOCD/STAND INSTRUMENT  - Vitamin D Deficiency; Future  - sertraline (ZOLOFT) 50 MG tablet; Take 1.5 tablets (75 mg) by mouth daily for 360 days  - Vitamin D Deficiency    Benign essential hypertension  Elevated today but is having asthma flare.  Good control at home per patient.  She will continue to monitor.  Check labs  - losartan (COZAAR) 25 MG tablet; Take 1 tablet (25 mg) by mouth daily    Hyperlipidemia LDL goal <100  Check fasting labs- no side effects  - Lipid panel reflex to direct LDL Non-fasting; Future  - REVIEW OF HEALTH MAINTENANCE PROTOCOL ORDERS  - simvastatin (ZOCOR) 40 MG tablet; Take 1 tablet (40 mg) by mouth at bedtime  - Lipid panel reflex to direct LDL Non-fasting    Brittle nails  Check basic labs  - Basic metabolic panel  (Ca, Cl, CO2,  "Creat, Gluc, K, Na, BUN); Future  - Vitamin D Deficiency; Future  - Vitamin B12; Future  - CBC with platelets and differential; Future  - Ferritin; Future  - Ferritin  - CBC with platelets and differential  - Vitamin B12  - Vitamin D Deficiency  - Basic metabolic panel  (Ca, Cl, CO2, Creat, Gluc, K, Na, BUN)    Microalbuminuria  Check labs  - losartan (COZAAR) 25 MG tablet; Take 1 tablet (25 mg) by mouth daily    Special screening for malignant neoplasms, colon  Screen- prefers FIT  - Fecal colorectal cancer screen (FIT); Future  - Fecal colorectal cancer screen (FIT)    Patient has been advised of split billing requirements and indicates understanding: Yes        BMI  Estimated body mass index is 39.89 kg/m  as calculated from the following:    Height as of this encounter: 1.645 m (5' 4.76\").    Weight as of this encounter: 108 kg (238 lb).   Weight management plan: Discussed healthy diet and exercise guidelines    Counseling  Appropriate preventive services were discussed with this patient, including applicable screening as appropriate for fall prevention, nutrition, physical activity, Tobacco-use cessation, weight loss and cognition.  Checklist reviewing preventive services available has been given to the patient.  Reviewed patient's diet, addressing concerns and/or questions.   Patient is at risk for social isolation and has been provided with information about the benefit of social connection.   She is at risk for psychosocial distress and has been provided with information to reduce risk.   The patient's PHQ-9 score is consistent with mild depression. She was provided with information regarding depression.     Ivanna Mcclellan is a 58 year old, presenting for the following:  Physical (Labs/ meds/Asthma and allergies been bad/Bad cough )        5/6/2024    10:13 AM   Additional Questions   Roomed by Fernanda        Health Care Directive  Patient does not have a Health Care Directive or Living Will: Patient " [facility] has Advance Directive and will bring in a copy to clinic.    HPI              5/6/2024   General Health   How would you rate your overall physical health? Good   Feel stress (tense, anxious, or unable to sleep) Only a little   (!) STRESS CONCERN      5/6/2024   Nutrition   Three or more servings of calcium each day? (!) NO   Diet: Regular (no restrictions)   How many servings of fruit and vegetables per day? (!) 0-1   How many sweetened beverages each day? 0-1         5/6/2024   Exercise   Days per week of moderate/strenous exercise 5 days   Average minutes spent exercising at this level 10 min         5/6/2024   Social Factors   Frequency of gathering with friends or relatives Never   Worry food won't last until get money to buy more No   Food not last or not have enough money for food? No   Do you have housing?  Yes   Are you worried about losing your housing? No   Lack of transportation? No   Unable to get utilities (heat,electricity)? No   (!) SOCIAL CONNECTIONS CONCERN      5/6/2024   Fall Risk   Fallen 2 or more times in the past year? No   Trouble with walking or balance? No          5/6/2024   Dental   Dentist two times every year? Yes         5/6/2024   TB Screening   Were you born outside of the [place]? No       Today's PHQ-9 Score:       5/6/2024     9:36 AM   PHQ-9 SCORE   PHQ-9 Total Score MyChart 6 (Mild depression)   PHQ-9 Total Score 6         5/6/2024   Substance Use   Alcohol more than 3/day or more than 7/wk No   Do you use any other substances recreationally? No     Social History     Tobacco Use    Smoking status: Never    Smokeless tobacco: Never   Vaping Use    Vaping status: Never Used   Substance Use Topics    Alcohol use: Yes     Comment: socially    Drug use: No           5/6/2024   LAST FHS-7 RESULTS   1st degree relative breast or ovarian cancer No   Any relative bilateral breast cancer No   Any male have breast cancer No   Any ONE woman have BOTH breast AND ovarian cancer No   Any  woman with breast cancer before 50yrs Yes   2 or more relatives with breast AND/OR ovarian cancer No   2 or more relatives with breast AND/OR bowel cancer No        Mammogram Screening - Mammogram every 1-2 years updated in Health Maintenance based on mutual decision making        2024   STI Screening   New sexual partner(s) since last STI/HIV test? No     History of abnormal Pap smear: NO - age 30-65 PAP every 5 years with negative HPV co-testing recommended        Latest Ref Rng & Units 2019     7:45 AM 2019     7:31 AM 2015    12:00 AM   PAP / HPV   PAP (Historical)   NIL  NIL    HPV 16 DNA NEG^Negative Negative      HPV 18 DNA NEG^Negative Negative      Other HR HPV NEG^Negative Negative        ASCVD Risk   The 10-year ASCVD risk score (Nafisa WASHINGTON, et al., 2019) is: 5.3%    Values used to calculate the score:      Age: 58 years      Sex: Female      Is Non- : No      Diabetic: No      Tobacco smoker: No      Systolic Blood Pressure: 150 mmHg      Is BP treated: Yes      HDL Cholesterol: 48 mg/dL      Total Cholesterol: 192 mg/dL    Fracture Risk Assessment Tool  Link to Frax Calculator  Use the information below to complete the Frax calculator  : 1965  Sex: female  Weight (kg): 108 kg (actual weight)  Height (cm): 164.5 cm  Previous Fragility Fracture:  No  History of parent with fractured hip:  No  Current Smoking:  No  Patient has been on glucocorticoids for more than 3 months (5mg/day or more): no  Rheumatoid Arthritis on Problem List:  No  Secondary Osteoporosis on Problem List:  No  Consumes 3 or more units of alcohol per day: No  Femoral Neck BMD (g/cm2)           Reviewed and updated as needed this visit by Provider                    Past Medical History:   Diagnosis Date    Cardiomegaly 2007    Ventricular - see echo 10/07    Cardiomegaly 2007    Ventricular - see echo 10/07    Microalbuminuria 2018    Microalbuminuria  "02/20/2018    Mild intermittent asthma     albuterol prn, prednisone as needed.    MITRAL VALVE DISORDER 11/07/2007    Regurg - mild - see echo 10/07     Past Surgical History:   Procedure Laterality Date    [facility] HYSTEROSCOPY W ENDOMETRIAL BX/POLYPECTOMY W/WO D&C  10/26/2000    Hysteroscopy, dilation and curettage, and diagnostic laparoscopy    HC REMOVAL OF OVARIAN CYST(S)      laparascopic ovarian cystectomy.    HYSTEROSCOPY  11/30/2007    Hysteroscopy, D&C and endometrial ablation.    RELEASE TRIGGER FINGER Left 7/26/2019    Procedure: left ring trigger finger release;  Surgeon: Rodger Hawkins DO;  Location: [facility]    TUBAL LIGATION      1990         Review of Systems  Constitutional, HEENT, cardiovascular, pulmonary, GI, , musculoskeletal, neuro, skin, endocrine and psych systems are negative, except as otherwise noted.     Objective    Exam  BP (!) 150/98 (Cuff Size: Adult Small)   Pulse 65   Temp 97.1  F (36.2  C) (Temporal)   Resp 18   Ht 1.645 m (5' 4.76\")   Wt 108 kg (238 lb)   LMP 02/19/2017   SpO2 98%   BMI 39.89 kg/m     Estimated body mass index is 39.89 kg/m  as calculated from the following:    Height as of this encounter: 1.645 m (5' 4.76\").    Weight as of this encounter: 108 kg (238 lb).    Physical Exam  GENERAL: alert and no distress  EYES: Eyes grossly normal to inspection, PERRL and conjunctivae and sclerae normal  HENT: ear canals and TM's normal, nose and mouth without ulcers or lesions  NECK: no adenopathy, no asymmetry, masses, or scars  RESP: expiratory wheezes bilateral  CV: regular rate and rhythm, normal S1 S2, no S3 or S4, no murmur, click or rub, no peripheral edema  ABDOMEN: soft, nontender, no hepatosplenomegaly, no masses and bowel sounds normal  MS: no gross musculoskeletal defects noted, no edema  SKIN: no suspicious lesions or rashes  NEURO: Normal strength and tone, mentation intact and speech normal  PSYCH: mentation appears normal, affect " normal/bright        Signed Electronically by: Kandace Rodriguez NP    Answers submitted by the patient for this visit:  Patient Health Questionnaire (Submitted on 5/6/2024)  If you checked off any problems, how difficult have these problems made it for you to do your work, take care of things at home, or get along with other people?: Somewhat difficult  PHQ9 TOTAL SCORE: 6

## 2024-05-06 NOTE — PATIENT INSTRUCTIONS
"Could try generic zyrtec  Advair- increase to two puffs twice a day when flaring  Prednisone taper  Preventive Care Advice   This is general advice we often give to help people stay healthy. Your care team may have specific advice just for you. Please talk to your care team about your own preventive care needs.  Lifestyle  Exercise at least 150 minutes each week (30 minutes a day, 5 days a week).  Do muscle strengthening activities 2 days a week. These help control your weight and prevent disease.  No smoking.  Wear sunscreen to prevent skin cancer.  Have your home tested for radon every 2 to 5 years. Radon is a colorless, odorless gas that can harm your lungs. To learn more, go to www.health.[facility].[place].us and search for \"Radon in Homes.\"  Keep guns unloaded and locked up in a safe place like a safe or gun vault, or, use a gun lock and hide the keys. Always lock away bullets separately. To learn more, visit [employer].[place].gov and search for \"safe gun storage.\"  Nutrition  Eat 5 or more servings of fruits and vegetables each day.  Try wheat bread, brown rice and whole grain pasta (instead of white bread, rice, and pasta).  Get enough calcium and vitamin D. Check the label on foods and aim for 100% of the RDA (recommended daily allowance).  Regular exams  Have a dental exam and cleaning every 6 months.  See your health care team every year to talk about:  Any changes in your health.  Any medicines your care team has prescribed.  Preventive care, family planning, and ways to prevent chronic diseases.  Shots (vaccines)   HPV shots (up to age 26), if you've never had them before.  Hepatitis B shots (up to age 59), if you've never had them before.  COVID-19 shot: Get this shot when it's due.  Flu shot: Get a flu shot every year.  Tetanus shot: Get a tetanus shot every 10 years.  Pneumococcal, hepatitis A, and RSV shots: Ask your care team if you need these based on your risk.  Shingles shot (for age 50 and up).  General health " tests  Diabetes screening:  Starting at age 35, Get screened for diabetes at least every 3 years.  If you are younger than age 35, ask your care team if you should be screened for diabetes.  Cholesterol test: At age 39, start having a cholesterol test every 5 years, or more often if advised.  Bone density scan (DEXA): At age 50, ask your care team if you should have this scan for osteoporosis (brittle bones).  Hepatitis C: Get tested at least once in your life.  Abdominal aortic aneurysm screening: Talk to your doctor about having this screening if you:  Have ever smoked; and  Are biologically male; and  Are between the ages of 65 and 75.  STIs (sexually transmitted infections)  Before age 24: Ask your care team if you should be screened for STIs.  After age 24: Get screened for STIs if you're at risk. You are at risk for STIs (including HIV) if:  You are sexually active with more than one person.  You don't use condoms every time.  You or a partner was diagnosed with a sexually transmitted infection.  If you are at risk for HIV, ask about PrEP medicine to prevent HIV.  Get tested for HIV at least once in your life, whether you are at risk for HIV or not.  Cancer screening tests  Cervical cancer screening: If you have a cervix, begin getting regular cervical cancer screening tests at age 21. Most people who have regular screenings with normal results can stop after age 65. Talk about this with your provider.  Breast cancer scan (mammogram): If you've ever had breasts, begin having regular mammograms starting at age 40. This is a scan to check for breast cancer.  Colon cancer screening: It is important to start screening for colon cancer at age 45.  Have a colonoscopy test every 10 years (or more often if you're at risk) Or, ask your provider about stool tests like a FIT test every year or Cologuard test every 3 years.  To learn more about your testing options, visit: [email or web address]/[email or web address].  For help making a  decision, visit: [email or web address].  Prostate cancer screening test: If you have a prostate and are age 55 to 69, ask your provider if you would benefit from a yearly prostate cancer screening test.  Lung cancer screening: If you are a current or former smoker age 50 to 80, ask your care team if ongoing lung cancer screenings are right for you.  For informational purposes only. Not to replace the advice of your health care provider. Copyright   2023 [facility]. All rights reserved. Clinically reviewed by the [facility] Transitions Program. [employer] 660003 - REV 04/24.    Relationships for Good Health  Relationships are important for our health and happiness. Social isolation, loneliness and lack of support are bad for your health. Studies show that loneliness can harm health and limit your life span as much as high blood pressure and smoking.   Take some time to reflect on your relationships. Then answer these questions:  Are there people in your life that cause you stress or drain your energy? What can you do to set limits?  ________________________________________________________________________________________________________________________________________________________________________________________________________________________________________________________________________________________________________________________________________________  Who do you enjoy spending time with? Who can you go to for support?  ________________________________________________________________________________________________________________________________________________________________________________________________________________________________________________________________________________________________________________________________________________  What can you do to improve your relationships with  others?  __________________________________________________________________________________________________________________________________________________________________________________________________________________  ______________________________________________________________________________________________________________________________  What do you like most about your relationships with others?  ________________________________________________________________________________________________________________________________________________________________________________________________________________________________________________________________________________________________________________________________________________  My goal: ______________________________________________________________________  I will ______________________________________________________________________________________________________________________________________________________________________________________________    For informational purposes only. Not to replace the advice of your health care provider. Copyright   2018 [facility]. All rights reserved. Clinically reviewed by Bariatric Health  Team. [employer] 880708 - Rev 04/21.    Learning About Stress  What is stress?     Stress is your body's response to a hard situation. Your body can have a physical, emotional, or mental response. Stress is a fact of life for most people, and it affects everyone differently. What causes stress for you may not be stressful for someone else.  A lot of things can cause stress. You may feel stress when you go on a job interview, take a test, or run a race. This kind of short-term stress is normal and even useful. It can help you if you need to work hard or react quickly. For example, stress can help you finish an important job on time.  Long-term stress is caused by ongoing stressful situations or events. Examples  of long-term stress include long-term health problems, ongoing problems at work, or conflicts in your family. Long-term stress can harm your health.  How does stress affect your health?  When you are stressed, your body responds as though you are in danger. It makes hormones that speed up your heart, make you breathe faster, and give you a burst of energy. This is called the fight-or-flight stress response. If the stress is over quickly, your body goes back to normal and no harm is done.  But if stress happens too often or lasts too long, it can have bad effects. Long-term stress can make you more likely to get sick, and it can make symptoms of some diseases worse. If you tense up when you are stressed, you may develop neck, shoulder, or low back pain. Stress is linked to high blood pressure and heart disease.  Stress also harms your emotional health. It can make you arreguin, tense, or depressed. Your relationships may suffer, and you may not do well at work or school.  What can you do to manage stress?  You can try these things to help manage stress:   Do something active. Exercise or activity can help reduce stress. Walking is a great way to get started. Even everyday activities such as housecleaning or yard work can help.  Try yoga or sharifa chi. These techniques combine exercise and meditation. You may need some training at first to learn them.  Do something you enjoy. For example, listen to music or go to a movie. Practice your hobby or do volunteer work.  Meditate. This can help you relax, because you are not worrying about what happened before or what may happen in the future.  Do guided imagery. Imagine yourself in any setting that helps you feel calm. You can use online videos, books, or a teacher to guide you.  Do breathing exercises. For example:  From a standing position, bend forward from the waist with your knees slightly bent. Let your arms dangle close to the floor.  Breathe in slowly and deeply as you  "return to a standing position. Roll up slowly and lift your head last.  Hold your breath for just a few seconds in the standing position.  Breathe out slowly and bend forward from the waist.  Let your feelings out. Talk, laugh, cry, and express anger when you need to. Talking with supportive friends or family, a counselor, or a yasir leader about your feelings is a healthy way to relieve stress. Avoid discussing your feelings with people who make you feel worse.  Write. It may help to write about things that are bothering you. This helps you find out how much stress you feel and what is causing it. When you know this, you can find better ways to cope.  What can you do to prevent stress?  You might try some of these things to help prevent stress:  Manage your time. This helps you find time to do the things you want and need to do.  Get enough sleep. Your body recovers from the stresses of the day while you are sleeping.  Get support. Your family, friends, and community can make a difference in how you experience stress.  Limit your news feed. Avoid or limit time on social media or news that may make you feel stressed.  Do something active. Exercise or activity can help reduce stress. Walking is a great way to get started.  Where can you learn more?  Go to https://www.[employer].net/patiented  Enter N032 in the search box to learn more about \"Learning About Stress.\"  Current as of: October 24, 2023               Content Version: 14.0    0670-2765 [employer].   Care instructions adapted under license by your healthcare professional. If you have questions about a medical condition or this instruction, always ask your healthcare professional. [employer] disclaims any warranty or liability for your use of this information.      Learning About Depression Screening  What is depression screening?  Depression screening is a way to see if you have depression symptoms. It may be done by a doctor or " "counselor. It's often part of a routine checkup. That's because your mental health is just as important as your physical health.  Depression is a mental health condition that affects how you feel, think, and act. You may:  Have less energy.  Lose interest in your daily activities.  Feel sad and grouchy for a long time.  Depression is very common. It affects people of all ages.  Many things can lead to depression. Some people become depressed after they have a stroke or find out they have a major illness like cancer or heart disease. The death of a loved one or a breakup may lead to depression. It can run in families. Most experts believe that a combination of inherited genes and stressful life events can cause it.  What happens during screening?  You may be asked to fill out a form about your depression symptoms. You and the doctor will discuss your answers. The doctor may ask you more questions to learn more about how you think, act, and feel.  What happens after screening?  If you have symptoms of depression, your doctor will talk to you about your options.  Doctors usually treat depression with medicines or counseling. Often, combining the two works best. Many people don't get help because they think that they'll get over the depression on their own. But people with depression may not get better unless they get treatment.  The cause of depression is not well understood. There may be many factors involved. But if you have depression, it's not your fault.  A serious symptom of depression is thinking about death or suicide. If you or someone you care about talks about this or about feeling hopeless, get help right away.  It's important to know that depression can be treated. Medicine, counseling, and self-care may help.  Where can you learn more?  Go to https://www.healthwise.net/patiented  Enter T185 in the search box to learn more about \"Learning About Depression Screening.\"  Current as of: June 24, " 2023               Content Version: 14.0    3445-8586 [email or web address].   Care instructions adapted under license by your healthcare professional. If you have questions about a medical condition or this instruction, always ask your healthcare professional. [email or web address] disclaims any warranty or liability for your use of this information.

## 2024-05-06 NOTE — LETTER
My Asthma Action Plan    Name: Vy Villalobos   YOB: 1965  Date: 5/6/2024   My doctor: Kandace Rodriguez NP   My clinic: [facility]        My Control Medicine:  adviar  My Rescue Medicine: Albuterol (Proair/Ventolin/Proventil HFA) 2-4 puffs EVERY 4 HOURS as needed. Use a spacer if recommended by your provider.  My Oral Steroid Medicine: per flare My Asthma Severity:   Moderate Persistent  Know your asthma triggers: upper respiratory infections  smoke  upper respiratory infections            GREEN ZONE   Good Control  I feel good  No cough or wheeze  Can work, sleep and play without asthma symptoms       Take your asthma control medicine every day.     If exercise triggers your asthma, take your rescue medication  15 minutes before exercise or sports, and  During exercise if you have asthma symptoms  Spacer to use with inhaler: If you have a spacer, make sure to use it with your inhaler             YELLOW ZONE Getting Worse  I have ANY of these:  I do not feel good  Cough or wheeze  Chest feels tight  Wake up at night   Keep taking your Green Zone medications  Start taking your rescue medicine:  every 20 minutes for up to 1 hour. Then every 4 hours for 24-48 hours.  If you stay in the Yellow Zone for more than 12-24 hours, contact your doctor.  If you do not return to the Green Zone in 12-24 hours or you get worse, start taking your oral steroid medicine if prescribed by your provider.           RED ZONE Medical Alert - Get Help  I have ANY of these:  I feel awful  Medicine is not helping  Breathing getting harder  Trouble walking or talking  Nose opens wide to breathe       Take your rescue medicine NOW  If your provider has prescribed an oral steroid medicine, start taking it NOW  Call your doctor NOW  If you are still in the Red Zone after 20 minutes and you have not reached your doctor:  Take your rescue medicine again and  Call 911 or go to the emergency room right  away    See your regular doctor within 2 weeks of an Emergency Room or Urgent Care visit for follow-up treatment.          Annual Reminders:  Meet with Asthma Educator,  Flu Shot in the Fall, consider Pneumonia Vaccination for patients with asthma (aged 19 and older).    Pharmacy:    MIGUELKATY HONG  [id number] - [place], [place] - [address]  [id number] - [place], [place] - [address] [facility] [place], [place] - [address]     Electronically signed by Kandace Rodriguez NP   Date: 05/06/24                      Asthma Triggers  How To Control Things That Make Your Asthma Worse    Triggers are things that make your asthma worse.  Look at the list below to help you find your triggers and what you can do about them.  You can help prevent asthma flare-ups by staying away from your triggers.      Trigger                                                          What you can do   Cigarette Smoke  Tobacco smoke can make asthma worse. Do not allow smoking in your home, car or around you.  Be sure no one smokes at a child s day care or school.  If you smoke, ask your health care provider for ways to help you quit.  Ask family members to quit too.  Ask your health care provider for a referral to Quit Plan to help you quit smoking, or call 1-316-783-PLAN.     Colds, Flu, Bronchitis  These are common triggers of asthma. Wash your hands often.  Don t touch your eyes, nose or mouth.  Get a flu shot every year.     Dust Mites  These are tiny bugs that live in cloth or carpet. They are too small to see. Wash sheets and blankets in hot water every week.   Encase pillows and mattress in dust mite proof covers.  Avoid having carpet if you can. If you have carpet, vacuum weekly.   Use a dust mask and HEPA vacuum.   Pollen and Outdoor Mold  Some people are allergic to trees, grass, or weed pollen, or molds. Try to keep your windows closed.  Limit time out doors when pollen count is high.   Ask you health  care provider about taking medicine during allergy season.     Animal Dander  Some people are allergic to skin flakes, urine or saliva from pets with fur or feathers. Keep pets with fur or feathers out of your home.    If you can t keep the pet outdoors, then keep the pet out of your bedroom.  Keep the bedroom door closed.  Keep pets off cloth furniture and away from stuffed toys.     Mice, Rats, and Cockroaches   Some people are allergic to the waste from these pests.   Cover food and garbage.  Clean up spills and food crumbs.  Store grease in the refrigerator.   Keep food out of the bedroom.   Indoor Mold  This can be a trigger if your home has high moisture. Fix leaking faucets, pipes, or other sources of water.   Clean moldy surfaces.  Dehumidify basement if it is damp and smelly.   Smoke, Strong Odors, and Sprays  These can reduce air quality. Stay away from strong odors and sprays, such as perfume, powder, hair spray, paints, smoke incense, paint, cleaning products, candles and new carpet.   Exercise or Sports  Some people with asthma have this trigger. Be active!  Ask your doctor about taking medicine before sports or exercise to prevent symptoms.    Warm up for 5-10 minutes before and after sports or exercise.     Other Triggers of Asthma  Cold air:  Cover your nose and mouth with a scarf.  Sometimes laughing or crying can be a trigger.  Some medicines and food can trigger asthma.

## 2024-05-07 ENCOUNTER — MYC MEDICAL ADVICE (OUTPATIENT)
Dept: FAMILY MEDICINE | Facility: CLINIC | Age: 59
End: 2024-05-07
Payer: COMMERCIAL

## 2024-05-07 DIAGNOSIS — Z12.11 SPECIAL SCREENING FOR MALIGNANT NEOPLASMS, COLON: Primary | ICD-10-CM

## 2024-05-07 LAB — HBA1C MFR BLD: 6 %

## 2024-05-08 ENCOUNTER — ORDERS ONLY (AUTO-RELEASED) (OUTPATIENT)
Dept: FAMILY MEDICINE | Facility: CLINIC | Age: 59
End: 2024-05-08
Payer: COMMERCIAL

## 2024-05-08 DIAGNOSIS — Z12.11 SPECIAL SCREENING FOR MALIGNANT NEOPLASMS, COLON: ICD-10-CM

## 2024-05-10 DIAGNOSIS — E78.5 HYPERLIPIDEMIA LDL GOAL <100: ICD-10-CM

## 2024-05-10 RX ORDER — SIMVASTATIN 40 MG
40 TABLET ORAL DAILY
Qty: 90 TABLET | Refills: 3 | Status: SHIPPED | OUTPATIENT
Start: 2024-05-10

## 2024-05-11 DIAGNOSIS — E78.5 HYPERLIPIDEMIA LDL GOAL <100: ICD-10-CM

## 2024-05-13 RX ORDER — SIMVASTATIN 40 MG
40 TABLET ORAL DAILY
Qty: 90 TABLET | Refills: 3 | OUTPATIENT
Start: 2024-05-13

## 2024-05-14 LAB — HEMOCCULT STL QL IA: NEGATIVE

## 2024-05-22 DIAGNOSIS — I10 BENIGN ESSENTIAL HYPERTENSION: ICD-10-CM

## 2024-05-22 DIAGNOSIS — R80.9 MICROALBUMINURIA: ICD-10-CM

## 2024-05-22 RX ORDER — LOSARTAN POTASSIUM 25 MG/1
12.5 TABLET ORAL DAILY
Qty: 45 TABLET | Refills: 3 | Status: SHIPPED | OUTPATIENT
Start: 2024-05-22

## 2024-05-24 DIAGNOSIS — I10 BENIGN ESSENTIAL HYPERTENSION: ICD-10-CM

## 2024-05-24 DIAGNOSIS — R80.9 MICROALBUMINURIA: ICD-10-CM

## 2024-05-24 RX ORDER — LOSARTAN POTASSIUM 25 MG/1
12.5 TABLET ORAL DAILY
Qty: 45 TABLET | Refills: 3 | OUTPATIENT
Start: 2024-05-24

## 2024-05-30 LAB — NONINV COLON CA DNA+OCC BLD SCRN STL QL: NEGATIVE

## 2024-06-06 DIAGNOSIS — I10 BENIGN ESSENTIAL HYPERTENSION: Primary | ICD-10-CM

## 2024-06-06 RX ORDER — METOPROLOL SUCCINATE 100 MG/1
100 TABLET, EXTENDED RELEASE ORAL DAILY
Qty: 90 TABLET | Refills: 3 | Status: SHIPPED | OUTPATIENT
Start: 2024-06-06

## 2024-06-17 DIAGNOSIS — F33.42 MAJOR DEPRESSIVE DISORDER, RECURRENT EPISODE, IN FULL REMISSION (H): ICD-10-CM

## 2024-06-17 DIAGNOSIS — F41.1 GAD (GENERALIZED ANXIETY DISORDER): ICD-10-CM

## 2024-10-27 DIAGNOSIS — F33.42 MAJOR DEPRESSIVE DISORDER, RECURRENT EPISODE, IN FULL REMISSION (H): ICD-10-CM

## 2024-10-27 DIAGNOSIS — F41.1 GAD (GENERALIZED ANXIETY DISORDER): ICD-10-CM

## 2024-11-07 ASSESSMENT — PATIENT HEALTH QUESTIONNAIRE - PHQ9: SUM OF ALL RESPONSES TO PHQ QUESTIONS 1-9: 3

## 2025-02-19 DIAGNOSIS — F41.1 GAD (GENERALIZED ANXIETY DISORDER): ICD-10-CM

## 2025-02-19 DIAGNOSIS — F33.42 MAJOR DEPRESSIVE DISORDER, RECURRENT EPISODE, IN FULL REMISSION: ICD-10-CM

## 2025-05-25 DIAGNOSIS — E78.5 HYPERLIPIDEMIA LDL GOAL <100: ICD-10-CM

## 2025-05-27 RX ORDER — SIMVASTATIN 40 MG
40 TABLET ORAL DAILY
Qty: 90 TABLET | Refills: 3 | Status: SHIPPED | OUTPATIENT
Start: 2025-05-27

## 2025-06-26 DIAGNOSIS — F33.42 MAJOR DEPRESSIVE DISORDER, RECURRENT EPISODE, IN FULL REMISSION: ICD-10-CM

## 2025-06-26 DIAGNOSIS — F41.1 GAD (GENERALIZED ANXIETY DISORDER): ICD-10-CM

## 2025-07-16 RX ORDER — MUPIROCIN 2 %
OINTMENT (GRAM) TOPICAL
COMMUNITY
Start: 2024-02-20

## 2025-07-17 RX ORDER — MUPIROCIN 2 %
OINTMENT (GRAM) TOPICAL
OUTPATIENT
Start: 2025-07-17

## 2025-07-17 NOTE — TELEPHONE ENCOUNTER
Clinic RN: Please investigate patient's chart or contact patient if the information cannot be found because the medication is listed as historical or discontinued. Confirm patient is taking this medication. Document findings and route refill encounter to provider for approval or denial.    Agnes Marsh RN on 7/17/2025 at 12:26 PM

## 2025-07-17 NOTE — TELEPHONE ENCOUNTER
RN spoke with patient, supposed to go back to ENT provider with [facility].     Khari Kim RN on 7/17/2025 at 2:38 PM

## 2025-08-17 DIAGNOSIS — I10 BENIGN ESSENTIAL HYPERTENSION: ICD-10-CM

## 2025-08-19 RX ORDER — METOPROLOL SUCCINATE 100 MG/1
100 TABLET, EXTENDED RELEASE ORAL DAILY
Qty: 90 TABLET | Refills: 3 | Status: SHIPPED | OUTPATIENT
Start: 2025-08-19

## 2025-09-04 ENCOUNTER — TELEPHONE (OUTPATIENT)
Dept: FAMILY MEDICINE | Facility: CLINIC | Age: 60
End: 2025-09-04
Payer: COMMERCIAL

## (undated) DEVICE — GLOVE PROTEXIS BLUE W/NEU-THERA 8.0  2D73EB80

## (undated) DEVICE — SU ETHILON 4-0 PS-2 18" 1667G

## (undated) DEVICE — GLOVE ESTEEM BLUE W/NEU-THERA 7.5  2D73PB75

## (undated) DEVICE — GLOVE PROTEXIS W/NEU-THERA 7.0  2D73TE70

## (undated) DEVICE — GLOVE PROTEXIS W/NEU-THERA 7.5  2D73TE75

## (undated) DEVICE — DRSG GAUZE 4X4" TRAY

## (undated) DEVICE — SOL NACL 0.9% IRRIG 1000ML BOTTLE 07138-09

## (undated) DEVICE — ESU GROUND PAD UNIVERSAL W/O CORD

## (undated) DEVICE — CAST PADDING 4" COTTON WEBRIL UNSTERILE 9084

## (undated) DEVICE — PACK HAND WRIST FOREARM CUSTOM

## (undated) DEVICE — BASIN SET MINOR DISP